# Patient Record
Sex: MALE | Race: WHITE | NOT HISPANIC OR LATINO
[De-identification: names, ages, dates, MRNs, and addresses within clinical notes are randomized per-mention and may not be internally consistent; named-entity substitution may affect disease eponyms.]

---

## 2017-01-27 ENCOUNTER — APPOINTMENT (OUTPATIENT)
Dept: INTERNAL MEDICINE | Facility: CLINIC | Age: 72
End: 2017-01-27

## 2017-01-27 VITALS
DIASTOLIC BLOOD PRESSURE: 66 MMHG | WEIGHT: 156 LBS | HEIGHT: 66 IN | BODY MASS INDEX: 25.07 KG/M2 | RESPIRATION RATE: 14 BRPM | TEMPERATURE: 98 F | OXYGEN SATURATION: 98 % | HEART RATE: 77 BPM | SYSTOLIC BLOOD PRESSURE: 108 MMHG

## 2017-04-28 ENCOUNTER — APPOINTMENT (OUTPATIENT)
Dept: INTERNAL MEDICINE | Facility: CLINIC | Age: 72
End: 2017-04-28

## 2017-04-28 VITALS
HEART RATE: 75 BPM | OXYGEN SATURATION: 98 % | DIASTOLIC BLOOD PRESSURE: 66 MMHG | SYSTOLIC BLOOD PRESSURE: 116 MMHG | WEIGHT: 155 LBS | TEMPERATURE: 98 F | BODY MASS INDEX: 24.91 KG/M2 | HEIGHT: 66 IN | RESPIRATION RATE: 14 BRPM

## 2017-05-01 ENCOUNTER — APPOINTMENT (OUTPATIENT)
Dept: ORTHOPEDIC SURGERY | Facility: CLINIC | Age: 72
End: 2017-05-01

## 2017-05-01 VITALS
BODY MASS INDEX: 24.91 KG/M2 | SYSTOLIC BLOOD PRESSURE: 136 MMHG | HEIGHT: 66 IN | HEART RATE: 77 BPM | WEIGHT: 155 LBS | DIASTOLIC BLOOD PRESSURE: 79 MMHG

## 2017-05-23 ENCOUNTER — APPOINTMENT (OUTPATIENT)
Dept: OTOLARYNGOLOGY | Facility: CLINIC | Age: 72
End: 2017-05-23

## 2017-06-15 ENCOUNTER — APPOINTMENT (OUTPATIENT)
Dept: INTERNAL MEDICINE | Facility: CLINIC | Age: 72
End: 2017-06-15

## 2017-06-15 VITALS
SYSTOLIC BLOOD PRESSURE: 110 MMHG | BODY MASS INDEX: 24.59 KG/M2 | DIASTOLIC BLOOD PRESSURE: 74 MMHG | WEIGHT: 153 LBS | RESPIRATION RATE: 14 BRPM | HEIGHT: 66 IN | OXYGEN SATURATION: 98 % | HEART RATE: 81 BPM | TEMPERATURE: 97.9 F

## 2017-06-15 DIAGNOSIS — Z86.79 PERSONAL HISTORY OF OTHER DISEASES OF THE CIRCULATORY SYSTEM: ICD-10-CM

## 2017-08-17 ENCOUNTER — APPOINTMENT (OUTPATIENT)
Dept: INTERNAL MEDICINE | Facility: CLINIC | Age: 72
End: 2017-08-17
Payer: MEDICARE

## 2017-08-17 VITALS
TEMPERATURE: 97.8 F | HEART RATE: 67 BPM | OXYGEN SATURATION: 97 % | DIASTOLIC BLOOD PRESSURE: 72 MMHG | HEIGHT: 66 IN | BODY MASS INDEX: 25.23 KG/M2 | WEIGHT: 157 LBS | RESPIRATION RATE: 14 BRPM | SYSTOLIC BLOOD PRESSURE: 120 MMHG

## 2017-08-17 PROCEDURE — 99215 OFFICE O/P EST HI 40 MIN: CPT

## 2017-10-16 ENCOUNTER — APPOINTMENT (OUTPATIENT)
Dept: PEDIATRIC ALLERGY IMMUNOLOGY | Facility: CLINIC | Age: 72
End: 2017-10-16

## 2017-10-20 ENCOUNTER — APPOINTMENT (OUTPATIENT)
Dept: INTERNAL MEDICINE | Facility: CLINIC | Age: 72
End: 2017-10-20
Payer: MEDICARE

## 2017-10-20 VITALS
TEMPERATURE: 97.9 F | DIASTOLIC BLOOD PRESSURE: 74 MMHG | RESPIRATION RATE: 14 BRPM | BODY MASS INDEX: 25.71 KG/M2 | HEART RATE: 70 BPM | OXYGEN SATURATION: 98 % | WEIGHT: 160 LBS | HEIGHT: 66 IN | SYSTOLIC BLOOD PRESSURE: 126 MMHG

## 2017-10-20 PROCEDURE — 99215 OFFICE O/P EST HI 40 MIN: CPT | Mod: 25

## 2017-10-20 PROCEDURE — 90662 IIV NO PRSV INCREASED AG IM: CPT

## 2017-10-20 PROCEDURE — G0008: CPT

## 2017-10-24 ENCOUNTER — APPOINTMENT (OUTPATIENT)
Dept: ALLERGY | Facility: CLINIC | Age: 72
End: 2017-10-24
Payer: MEDICARE

## 2017-10-24 VITALS — WEIGHT: 160 LBS | HEIGHT: 66 IN | BODY MASS INDEX: 25.71 KG/M2

## 2017-10-24 PROCEDURE — 99204 OFFICE O/P NEW MOD 45 MIN: CPT

## 2017-10-30 ENCOUNTER — APPOINTMENT (OUTPATIENT)
Dept: ALLERGY | Facility: CLINIC | Age: 72
End: 2017-10-30
Payer: MEDICARE

## 2017-10-30 PROCEDURE — 95044 PATCH/APPLICATION TESTS: CPT

## 2017-11-01 ENCOUNTER — APPOINTMENT (OUTPATIENT)
Dept: ALLERGY | Facility: CLINIC | Age: 72
End: 2017-11-01
Payer: MEDICARE

## 2017-11-01 PROCEDURE — 99212 OFFICE O/P EST SF 10 MIN: CPT

## 2017-11-02 ENCOUNTER — APPOINTMENT (OUTPATIENT)
Dept: ALLERGY | Facility: CLINIC | Age: 72
End: 2017-11-02
Payer: MEDICARE

## 2017-11-02 PROCEDURE — 99213 OFFICE O/P EST LOW 20 MIN: CPT

## 2017-11-02 RX ORDER — TRIAMCINOLONE ACETONIDE 1 MG/G
0.1 CREAM TOPICAL
Qty: 60 | Refills: 0 | Status: DISCONTINUED | COMMUNITY
Start: 2017-09-20

## 2017-12-05 ENCOUNTER — APPOINTMENT (OUTPATIENT)
Dept: INTERNAL MEDICINE | Facility: CLINIC | Age: 72
End: 2017-12-05
Payer: MEDICARE

## 2017-12-05 VITALS
RESPIRATION RATE: 14 BRPM | WEIGHT: 160 LBS | HEIGHT: 66 IN | BODY MASS INDEX: 25.71 KG/M2 | HEART RATE: 76 BPM | DIASTOLIC BLOOD PRESSURE: 60 MMHG | TEMPERATURE: 97.7 F | OXYGEN SATURATION: 98 % | SYSTOLIC BLOOD PRESSURE: 110 MMHG

## 2017-12-05 PROCEDURE — 36415 COLL VENOUS BLD VENIPUNCTURE: CPT

## 2017-12-05 PROCEDURE — G0439: CPT

## 2017-12-06 LAB
25(OH)D3 SERPL-MCNC: 33.6 NG/ML
APPEARANCE: CLEAR
BASOPHILS # BLD AUTO: 0.02 K/UL
BASOPHILS NFR BLD AUTO: 0.5 %
BILIRUBIN URINE: NEGATIVE
BLOOD URINE: NEGATIVE
COLOR: YELLOW
EOSINOPHIL # BLD AUTO: 0.13 K/UL
EOSINOPHIL NFR BLD AUTO: 3 %
GLUCOSE QUALITATIVE U: NEGATIVE MG/DL
HBA1C MFR BLD HPLC: 5.4 %
HCT VFR BLD CALC: 44.6 %
HGB BLD-MCNC: 14.8 G/DL
IMM GRANULOCYTES NFR BLD AUTO: 0.2 %
KETONES URINE: NEGATIVE
LEUKOCYTE ESTERASE URINE: NEGATIVE
LYMPHOCYTES # BLD AUTO: 1.25 K/UL
LYMPHOCYTES NFR BLD AUTO: 28.7 %
MAN DIFF?: NORMAL
MCHC RBC-ENTMCNC: 31.8 PG
MCHC RBC-ENTMCNC: 33.2 GM/DL
MCV RBC AUTO: 95.7 FL
MONOCYTES # BLD AUTO: 0.52 K/UL
MONOCYTES NFR BLD AUTO: 11.9 %
NEUTROPHILS # BLD AUTO: 2.43 K/UL
NEUTROPHILS NFR BLD AUTO: 55.7 %
NITRITE URINE: NEGATIVE
PH URINE: 7
PLATELET # BLD AUTO: 193 K/UL
PROTEIN URINE: NEGATIVE MG/DL
RBC # BLD: 4.66 M/UL
RBC # FLD: 12.6 %
SPECIFIC GRAVITY URINE: 1.02
TSH SERPL-ACNC: 3.01 UIU/ML
UROBILINOGEN URINE: 1 MG/DL
WBC # FLD AUTO: 4.36 K/UL

## 2017-12-12 ENCOUNTER — APPOINTMENT (OUTPATIENT)
Dept: ALLERGY | Facility: CLINIC | Age: 72
End: 2017-12-12
Payer: MEDICARE

## 2017-12-12 VITALS
HEART RATE: 76 BPM | DIASTOLIC BLOOD PRESSURE: 60 MMHG | RESPIRATION RATE: 14 BRPM | BODY MASS INDEX: 25.71 KG/M2 | SYSTOLIC BLOOD PRESSURE: 110 MMHG | HEIGHT: 66 IN | WEIGHT: 160 LBS

## 2017-12-12 PROCEDURE — 99214 OFFICE O/P EST MOD 30 MIN: CPT

## 2017-12-21 ENCOUNTER — APPOINTMENT (OUTPATIENT)
Dept: ALLERGY | Facility: CLINIC | Age: 72
End: 2017-12-21
Payer: MEDICARE

## 2017-12-21 PROCEDURE — 95018 ALL TSTG PERQ&IQ DRUGS/BIOL: CPT

## 2018-01-16 ENCOUNTER — APPOINTMENT (OUTPATIENT)
Dept: ALLERGY | Facility: CLINIC | Age: 73
End: 2018-01-16
Payer: MEDICARE

## 2018-01-16 PROCEDURE — 95076 INGEST CHALLENGE INI 120 MIN: CPT

## 2018-01-23 ENCOUNTER — MESSAGE (OUTPATIENT)
Age: 73
End: 2018-01-23

## 2018-02-06 ENCOUNTER — APPOINTMENT (OUTPATIENT)
Dept: INTERNAL MEDICINE | Facility: CLINIC | Age: 73
End: 2018-02-06
Payer: MEDICARE

## 2018-02-06 VITALS
OXYGEN SATURATION: 98 % | SYSTOLIC BLOOD PRESSURE: 122 MMHG | WEIGHT: 159 LBS | RESPIRATION RATE: 14 BRPM | HEART RATE: 84 BPM | TEMPERATURE: 98.1 F | DIASTOLIC BLOOD PRESSURE: 66 MMHG | HEIGHT: 66 IN | BODY MASS INDEX: 25.55 KG/M2

## 2018-02-06 PROCEDURE — 99215 OFFICE O/P EST HI 40 MIN: CPT

## 2018-02-06 RX ORDER — PENICILLIN G SODIUM 5000000 [USP'U]/1
5000000 INJECTION, POWDER, FOR SOLUTION INTRAMUSCULAR; INTRAVENOUS
Qty: 1 | Refills: 0 | Status: DISCONTINUED | COMMUNITY
Start: 2017-12-19 | End: 2018-02-06

## 2018-02-06 RX ORDER — CEFAZOLIN 1 G/1
1 INJECTION, POWDER, FOR SOLUTION INTRAMUSCULAR; INTRAVENOUS
Qty: 1 | Refills: 0 | Status: DISCONTINUED | COMMUNITY
Start: 2017-12-19 | End: 2018-02-06

## 2018-02-06 RX ORDER — BETAMETHASONE DIPROPIONATE 0.5 MG/G
0.05 CREAM, AUGMENTED TOPICAL TWICE DAILY
Qty: 60 | Refills: 0 | Status: DISCONTINUED | COMMUNITY
Start: 2017-10-24 | End: 2018-02-06

## 2018-02-06 RX ORDER — AMOXICILLIN 250 MG/5ML
250 POWDER, FOR SUSPENSION ORAL 3 TIMES DAILY
Qty: 100 | Refills: 0 | Status: DISCONTINUED | COMMUNITY
Start: 2018-01-09 | End: 2018-02-06

## 2018-03-20 ENCOUNTER — APPOINTMENT (OUTPATIENT)
Dept: INTERNAL MEDICINE | Facility: CLINIC | Age: 73
End: 2018-03-20
Payer: MEDICARE

## 2018-03-20 VITALS
BODY MASS INDEX: 25.23 KG/M2 | HEIGHT: 66 IN | WEIGHT: 157 LBS | OXYGEN SATURATION: 97 % | RESPIRATION RATE: 14 BRPM | DIASTOLIC BLOOD PRESSURE: 60 MMHG | SYSTOLIC BLOOD PRESSURE: 110 MMHG | HEART RATE: 85 BPM | TEMPERATURE: 98.8 F

## 2018-03-20 DIAGNOSIS — Z86.79 PERSONAL HISTORY OF OTHER DISEASES OF THE CIRCULATORY SYSTEM: ICD-10-CM

## 2018-03-20 PROCEDURE — 99215 OFFICE O/P EST HI 40 MIN: CPT

## 2018-05-04 ENCOUNTER — APPOINTMENT (OUTPATIENT)
Dept: INTERNAL MEDICINE | Facility: CLINIC | Age: 73
End: 2018-05-04
Payer: MEDICARE

## 2018-05-04 VITALS
SYSTOLIC BLOOD PRESSURE: 122 MMHG | HEART RATE: 71 BPM | OXYGEN SATURATION: 98 % | WEIGHT: 157 LBS | BODY MASS INDEX: 25.23 KG/M2 | TEMPERATURE: 97.8 F | HEIGHT: 66 IN | DIASTOLIC BLOOD PRESSURE: 68 MMHG | RESPIRATION RATE: 14 BRPM

## 2018-05-04 DIAGNOSIS — M54.16 RADICULOPATHY, LUMBAR REGION: ICD-10-CM

## 2018-05-04 DIAGNOSIS — Z88.1 ALLERGY STATUS TO OTHER ANTIBIOTIC AGENTS: ICD-10-CM

## 2018-05-04 PROCEDURE — 99215 OFFICE O/P EST HI 40 MIN: CPT

## 2018-05-04 RX ORDER — TIMOLOL MALEATE 5 MG/ML
0.5 SOLUTION OPHTHALMIC
Qty: 10 | Refills: 0 | Status: ACTIVE | COMMUNITY
Start: 2018-04-13

## 2018-06-15 ENCOUNTER — APPOINTMENT (OUTPATIENT)
Dept: INTERNAL MEDICINE | Facility: CLINIC | Age: 73
End: 2018-06-15
Payer: MEDICARE

## 2018-06-15 VITALS
DIASTOLIC BLOOD PRESSURE: 64 MMHG | WEIGHT: 158 LBS | RESPIRATION RATE: 14 BRPM | SYSTOLIC BLOOD PRESSURE: 126 MMHG | OXYGEN SATURATION: 99 % | TEMPERATURE: 97.7 F | HEIGHT: 66 IN | BODY MASS INDEX: 25.39 KG/M2 | HEART RATE: 70 BPM

## 2018-06-15 DIAGNOSIS — M48.061 SPINAL STENOSIS, LUMBAR REGION WITHOUT NEUROGENIC CLAUDICATION: ICD-10-CM

## 2018-06-15 DIAGNOSIS — Z87.2 PERSONAL HISTORY OF DISEASES OF THE SKIN AND SUBCUTANEOUS TISSUE: ICD-10-CM

## 2018-06-15 PROCEDURE — 99215 OFFICE O/P EST HI 40 MIN: CPT

## 2018-06-15 NOTE — HISTORY OF PRESENT ILLNESS
[FreeTextEntry1] : \par Feeling well today.\par Needs med refills.\par \par \par hx left hip pain- stable, no meds taken\par -hx [facility] in [facility] eval 2/18 for eval of left hip pain (hx focal pain at lateral hip when sleeping on area) - states had xray and examination, told 2/2 lumbar stenosis, advised exercise with PT - declines; also advised naproxen prn- not using, is prefers to avoid meds. \par -doing home exercising with help in sx's, no pain meds used\par -denies trouble with walking or weakness\par \par hx intermittent hives- resolved\par -no recent use of topical steroid, notes only change was new laundry detergent (now Pursil, was Tide).  No change in what carries in pocket.  Had allergy testing with A/I, told not PCN allergic.\par -hx rash at b/l lateral legs w/ occ a/w tingling sensation on top of head w/o rash noted.   \par -followed by derm, had lesion bx c/w hypersensitivity reaction.  Last seen 5/18 with [facility]- told benign\par -followed by A/I- hx negative patch testing, no clear etiology found\par -denies facial swelling, throat discomfort or sob\par -denies fever, chills, GI sx's, URI sx's or cough.\par \par hx b/l shoulder pain on/off- stable\par -mainly lying on them- hx PT w/o help as referred by ortho in past, requesting referral for new ortho.  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  Still meditating- using "head space cheikh", which likes very much.  Denies depression or SI.\par -still not interested in taking Rx for mood regularly or seeing a psychiatrist\par -hx seeing therapist, felt did not help him much and does not plan to return\par -remains socially active, has close friends and family nearby.  Attends social events (dances, movies, etc.) with friends.  Started listening to [employer] radio and found a very soothing station feels helps his mood tremendously.  Sleeping okay, occ Tylenol PM.\par -sx onset 2/2 adopted son who is in and out of rehab due to alcoholism, feels they have good relationship, feels safe.  Son does not live with him.  States son now out of work and still drinking, not going to rehab.  States son is out of hospital but thinks still drinking. \par -attending [facility] regularly\par \par HLD, CAD-- taking crestor w/o missed doses and tries to eat low fat diet. \par -saw cardio 2/18 advised to cont. current regimen per pt with labs slip given, plans to do soon and forward results.  Has f/u 8/18.\par -hx negative stress test  2016\par -walks 2-3x/d 45 min w/o sx's or limitations.\par -denies dizziness, CP, palpitations, sob or LE edema.\par \par pre-DM--\par -reports healthier eating, exercising\par \par hx urine frequency--stable\par -denies abd pain, n/v, gross hematuria, hesitancy, dysuria or LBP.  \par -followed by , seen 3/18 with nl PSA reported\par \par hx hearing loss- using hearing aides, working well.\par -hx ENT eval\par \par Reports nl appetite and BMs.\par Denies any GI complaints.\par \par Hx glaucoma-\par -hx optho eval 4/18- told all okay.\par \par independent with ADLs\par denies imbalance or falls\par driving w/o issue\par HCP: sonChip- has copy at home\par \par \par

## 2018-06-15 NOTE — ASSESSMENT
[FreeTextEntry1] : \par hx skin rash- hives, unclear etiology, s/p bx by derm 9/17 c/w ?hypersensitivity , now resolved (? relation to detergent)\par -on topical steroid by derm prn- no recent use\par -followed by A/I - s/p negative patch testing, also found NOT allergic to PCN\par -advised prompt ER eval if any sob, facial swelling, throat discomfort etc.\par \par hx anxiety/depression-- 2/2 social issues (son with cancer and hx ETOH abuse); improved- stable\par -off zoloft 2/2 SEs, declines trial of alternate medication or psychiatry referral\par -on lorazepam qhs prn, refilled today (istop [id number]).  Risks/side effects discussed.  Advised to avoid driving and concurrent ETOH use with taking medication.  To avoid concurrent use with Tylenol PM.  Need for office visit for med renewal discussed.\par -hx following with therapist, Dr. Boothe- declines f/u, feels not needed\par -Attending Millic[employer], encouraged\par -reports good social support\par -advised to f/u if sx's worsen\par -relaxation techniques reviewed, considering medication course\par -12/17 TFTs wnl\par \par hx chronic right neck/shoulder/ left upper back radiculopathy pain, hip pain (told 2/2 lumbar stenosis by ortho per pt)- chronic, stable\par -followed by ortho at [facility], hx injection 11/15.  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  Plans for second opinion on HSS.\par -followed for hip/lumbar stenosis at [facility]- seen 2/18, doing home PT on own with improvement\par -on Tylenol prn\par \par hx microscopic hematuria, frequency--likely hx BPH, s/p UTI tx in 1/14\par -4/14 pelvic sono with mild prostate enlargement\par -followed by , seen yearly- last 3/18 w/o any changes per pt and negative UA and nl PSA\par -11/17 UA negative\par \par HLD--11/17 Tchol 161  LDL 96 HDL 40; LFTs wnl\par -hx self d/c'd lovaza 1/14 due to cost (no hx adverse rxn)\par -on crestor\par -followed by cardio - seen 8/17 w/o changes- had f/u 2/18, plans to do labs soon and forward\par \par CAD--hx stents; asx\par -on ASA, statin, ACE-I (started post-stent)\par -hx neg stress test 2016 per pt\par -followed by cardio (Dr. Brooks in [place]) - seen 2/18 w/o changes.  To f/u 8/18.\par \par pre-DM--12/17 A1c- 5.6 (was 5.7 prior)- resolved\par -ADA diet and exercise counseled\par \par hx glaucoma, detached retina repair--asx\par -followed by optho, seen 4/18, told stable per pt.  Seen q 6mo.\par \par hx b/l hearing loss- stable\par -followed by ENT, hx hearing test 4/16--advised hearing aids, using since 4/17.  \par -advised to avoid qtips, use debrox with cotton prn\par \par \par HCM\par --hx CPE 12/17\par --11/17 cmp/lipids wnl by cardio\par --hx flu shot 10/17\par --hx PVX 4/11\par --hx prevnar 11/15\par --hx Tdap 7/14\par --hx shingles vaccine 7/20/11\par --to check on insurance coverage for shingrix and f/u if desires\par --hx colonoscopy 2014, followed by GI for hx polyps for repeat 5 yrs (2019)\par --followed by derm, yearly screening advised- last 5/18 per pt.  Regular use of sun block for skin cancer prevention counseled.\par --HCP: sonChip- has copy at home, asked to bring copy for chart\par \par \par Pt's cell: [phone]\par

## 2018-06-15 NOTE — REVIEW OF SYSTEMS
[see HPI] : see HPI [Negative] : Neurological [Feeling Poorly] : not feeling poorly [Earache] : no earache [Chest Pain] : no chest pain [Palpitations] : no palpitations [Lower Ext Edema] : no extremity edema [Cough] : no cough [Orthopnea] : no orthopnea [SOB on Exertion] : no shortness of breath during exertion [PND] : no PND [Abdominal Pain] : no abdominal pain [Melena] : no melena [Dysuria] : no dysuria [Dizziness] : no dizziness [Limb Weakness] : no limb weakness [Suicidal] : not suicidal [Depression] : no depression

## 2018-06-15 NOTE — PHYSICAL EXAM
[General Appearance - Alert] : alert [General Appearance - In No Acute Distress] : in no acute distress [General Appearance - Well-Appearing] : healthy appearing [Sclera] : the sclera and conjunctiva were normal [PERRL With Normal Accommodation] : pupils were equal in size, round, and reactive to light [Extraocular Movements] : extraocular movements were intact [Outer Ear] : the ears and nose were normal in appearance [Nasal Cavity] : the nasal mucosa and septum were normal [Oropharynx] : the oropharynx was normal [Neck Appearance] : the appearance of the neck was normal [Thyroid Diffuse Enlargement] : the thyroid was not enlarged [Respiration, Rhythm And Depth] : normal respiratory rhythm and effort [Auscultation Breath Sounds / Voice Sounds] : lungs were clear to auscultation bilaterally [Heart Rate And Rhythm] : heart rate was normal and rhythm regular [Heart Sounds] : normal S1 and S2 [Murmurs] : no murmurs [Full Pulse] : the pedal pulses are present [Edema] : there was no peripheral edema [Bowel Sounds] : normal bowel sounds [Abdomen Soft] : soft [Abdomen Tenderness] : non-tender [Cervical Lymph Nodes Enlarged Posterior Bilaterally] : posterior cervical [Cervical Lymph Nodes Enlarged Anterior Bilaterally] : anterior cervical [Supraclavicular Lymph Nodes Enlarged Bilaterally] : supraclavicular [No CVA Tenderness] : no ~M costovertebral angle tenderness [No Spinal Tenderness] : no spinal tenderness [Abnormal Walk] : normal gait [Musculoskeletal - Swelling] : no joint swelling seen [] : no rash [No Focal Deficits] : no focal deficits [Oriented To Time, Place, And Person] : oriented to person, place, and time [Affect] : the affect was normal [Mood] : the mood was normal [FreeTextEntry1] : scattered freckles

## 2018-06-19 ENCOUNTER — MEDICATION RENEWAL (OUTPATIENT)
Age: 73
End: 2018-06-19

## 2018-08-08 ENCOUNTER — TRANSCRIPTION ENCOUNTER (OUTPATIENT)
Age: 73
End: 2018-08-08

## 2018-08-13 ENCOUNTER — FORM ENCOUNTER (OUTPATIENT)
Age: 73
End: 2018-08-13

## 2018-08-14 ENCOUNTER — APPOINTMENT (OUTPATIENT)
Dept: INTERNAL MEDICINE | Facility: CLINIC | Age: 73
End: 2018-08-14
Payer: MEDICARE

## 2018-08-14 ENCOUNTER — APPOINTMENT (OUTPATIENT)
Dept: RADIOLOGY | Facility: IMAGING CENTER | Age: 73
End: 2018-08-14

## 2018-08-14 ENCOUNTER — OUTPATIENT (OUTPATIENT)
Dept: OUTPATIENT SERVICES | Facility: HOSPITAL | Age: 73
LOS: 1 days | End: 2018-08-14
Payer: MEDICARE

## 2018-08-14 VITALS
HEART RATE: 71 BPM | RESPIRATION RATE: 14 BRPM | BODY MASS INDEX: 25.71 KG/M2 | WEIGHT: 160 LBS | SYSTOLIC BLOOD PRESSURE: 106 MMHG | DIASTOLIC BLOOD PRESSURE: 62 MMHG | HEIGHT: 66 IN | OXYGEN SATURATION: 98 % | TEMPERATURE: 98 F

## 2018-08-14 DIAGNOSIS — K46.9 UNSPECIFIED ABDOMINAL HERNIA WITHOUT OBSTRUCTION OR GANGRENE: Chronic | ICD-10-CM

## 2018-08-14 DIAGNOSIS — M79.642 PAIN IN LEFT HAND: ICD-10-CM

## 2018-08-14 PROCEDURE — 73130 X-RAY EXAM OF HAND: CPT

## 2018-08-14 PROCEDURE — 73110 X-RAY EXAM OF WRIST: CPT

## 2018-08-14 PROCEDURE — 99214 OFFICE O/P EST MOD 30 MIN: CPT

## 2018-08-14 NOTE — PHYSICAL EXAM
[General Appearance - Alert] : alert [General Appearance - In No Acute Distress] : in no acute distress [General Appearance - Well-Appearing] : healthy appearing [Sclera] : the sclera and conjunctiva were normal [PERRL With Normal Accommodation] : pupils were equal in size, round, and reactive to light [Extraocular Movements] : extraocular movements were intact [Outer Ear] : the ears and nose were normal in appearance [Nasal Cavity] : the nasal mucosa and septum were normal [Oropharynx] : the oropharynx was normal [Neck Appearance] : the appearance of the neck was normal [Thyroid Diffuse Enlargement] : the thyroid was not enlarged [Auscultation Breath Sounds / Voice Sounds] : lungs were clear to auscultation bilaterally [Respiration, Rhythm And Depth] : normal respiratory rhythm and effort [Heart Rate And Rhythm] : heart rate was normal and rhythm regular [Heart Sounds] : normal S1 and S2 [Murmurs] : no murmurs [Full Pulse] : the pedal pulses are present [Edema] : there was no peripheral edema [Bowel Sounds] : normal bowel sounds [Abdomen Soft] : soft [Abdomen Tenderness] : non-tender [Cervical Lymph Nodes Enlarged Posterior Bilaterally] : posterior cervical [Cervical Lymph Nodes Enlarged Anterior Bilaterally] : anterior cervical [Supraclavicular Lymph Nodes Enlarged Bilaterally] : supraclavicular [No CVA Tenderness] : no ~M costovertebral angle tenderness [No Spinal Tenderness] : no spinal tenderness [Abnormal Walk] : normal gait [Musculoskeletal - Swelling] : no joint swelling seen [] : no rash [No Focal Deficits] : no focal deficits [Oriented To Time, Place, And Person] : oriented to person, place, and time [Affect] : the affect was normal [Mood] : the mood was normal [FreeTextEntry1] : scattered freckles

## 2018-08-14 NOTE — HISTORY OF PRESENT ILLNESS
[FreeTextEntry1] : \par Feeling well today.\par Needs med refills.\par Has copy of labs done by cardio 7/18\par \par c/o left thumb pain- x 2 weeks, on/off with certain movements- grabbing\par -focal sharp pain at base of thumb\par -no hx trauma, joint swelling or redness; no weakness or paresthesias\par -no pain med taken for it\par -is ambidextrous, equal usage reported\par \par hx left hip pain- stable, no meds taken\par -hx [facility] in [facility] eval 2/18 for eval of left hip pain (hx focal pain at lateral hip when sleeping on area) - states had xray and examination, told 2/2 lumbar stenosis, advised exercise with PT - declines; also advised naproxen prn- not using, is prefers to avoid meds. \par -doing home exercising with help in sx's, no pain meds used\par -denies trouble with walking or weakness\par \par hx intermittent hives- resolved\par -no recent use of topical steroid, notes only change was new laundry detergent (now Pursil, was Tide).  No change in what carries in pocket.  Had allergy testing with A/I, told not PCN allergic.\par -hx rash at b/l lateral legs w/ occ a/w tingling sensation on top of head w/o rash noted.   \par -followed by derm, had lesion bx c/w hypersensitivity reaction.  Last seen 5/18 with FBSE- told benign\par -followed by A/I- hx negative patch testing, no clear etiology found\par -denies facial swelling, throat discomfort or sob\par -denies fever, chills, GI sx's, URI sx's or cough.\par \par hx b/l shoulder pain on/off- stable\par -mainly lying on them- hx PT w/o help as referred by ortho in past, requesting referral for new ortho.  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  Still meditating- using "head space cheikh", which likes very much.  Denies depression or SI.\par -still not interested in taking Rx for mood regularly or seeing a psychiatrist\par -hx seeing therapist, felt did not help him much and does not plan to return\par -remains socially active, has close friends and family nearby.  Attends social events (dances, movies, etc.) with friends.  Started listening to [employer] radio and found a very soothing station feels helps his mood tremendously.  Sleeping okay, occ Tylenol PM.\par -sx onset 2/2 adopted son who is in and out of rehab due to alcoholism, feels they have good relationship, feels safe.  Son does not live with him.  States son now out of work and still drinking, not going to rehab.  States son is out of hospital but thinks still drinking. \par -attending ALANON regularly\par \par HLD, CAD-- taking crestor w/o missed doses and tries to eat low fat diet. \par -saw cardio yesterday with labs done 7/18 reviewed and no changes made per pt, reports awaiting screening stress test 8/18\par -hx negative stress test  2016\par -walks 2-3x/d 45 min w/o sx's or limitations.\par -denies dizziness, CP, palpitations, sob or LE edema.\par \par pre-DM--\par -reports healthier eating, exercising\par \par hx urine frequency--stable\par -denies abd pain, n/v, gross hematuria, hesitancy, dysuria or LBP.  \par -followed by , seen 3/18 with nl PSA reported\par \par hx hearing loss- using hearing aides, recently not working well- now being evaluated by company.\par -hx ENT eval\par \par Reports nl appetite and BMs.\par Denies any GI complaints.\par \par Hx glaucoma-\par -hx optho eval 4/18- told all okay.\par \par independent with ADLs\par denies imbalance or falls\par driving w/o issue\par HCP: sonChip- has copy at home\par \par \par

## 2018-08-14 NOTE — DATA REVIEWED
[FreeTextEntry1] : [facility] labs- to be scanned into chart\par \par 7/20/18\par cmp wnl\par Tchol 143  LDL 75 HDL 40

## 2018-08-14 NOTE — ASSESSMENT
[FreeTextEntry1] : \par left hand pain- ? tendonitis vs OA\par -check xray of left thumb and wrist\par -advised Tylenol prn\par -advised to f/u if sx's worsen\par \par hx skin rash- hives, unclear etiology, s/p bx by derm 9/17 c/w ?hypersensitivity , now resolved (? relation to detergent)\par -on topical steroid by derm prn- no recent use\par -followed by A/I - s/p negative patch testing, also found NOT allergic to PCN\par -advised prompt ER eval if any sob, facial swelling, throat discomfort etc.\par \par hx anxiety/depression-- 2/2 social issues (son with cancer and hx ETOH abuse); improved- stable\par -off zoloft 2/2 SEs, declines trial of alternate medication or psychiatry referral\par -on lorazepam qhs prn, refilled today (istop [id number] Risks/side effects discussed.  Advised to avoid driving and concurrent ETOH use with taking medication.  To avoid concurrent use with Tylenol PM.  Need for office visit for med renewal discussed.\par -hx following with therapist, Dr. Boothe- declines f/u, feels not needed\par -Attending [employer], encouraged\par -reports good social support\par -advised to f/u if sx's worsen\par -relaxation techniques reviewed, considering medication course\par -12/17 TFTs wnl\par \par hx chronic right neck/shoulder/ left upper back radiculopathy pain, hip pain (told 2/2 lumbar stenosis by ortho per pt)- chronic, stable\par -followed by ortho at [facility], hx injection 11/15.  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  Plans for second opinion on HSS.\par -[facility]- seen [facility]/18, doing home PT on own with improvement\par -on Tylenol prn\par \par hx microscopic hematuria, frequency--likely hx BPH, s/p UTI tx in 1/14\par -4/14 pelvic sono with mild prostate enlargement\par -followed by , seen yearly- last 3/18 w/o any changes per pt and negative UA and nl PSA\par -11/17 UA negative\par \par HLD-- 7/18 Tchol 143  LDL 75 HDL 40; LFTs wnl\par -hx self d/c'd lovaza 1/14 due to cost (no hx adverse rxn)\par -on crestor\par -followed by cardio - 8/13/18 with labs done 7/18 reviewed and no changes made per pt, reports awaiting screening stress test 8/18\par \par CAD--hx stents; asx\par -on ASA, statin, ACE-I (started post-stent)\par -hx neg stress test 2016 per pt\par -followed by cardio (Dr. Brooks in [place]) - seen 8/18 with labs done 7/18 reviewed and no changes made per pt, reports awaiting screening stress test 8/18\par \par pre-DM--12/17 A1c- 5.6 (was 5.7 prior)- resolved\par -ADA diet and exercise counseled\par \par hx glaucoma, detached retina repair--asx\par -followed by optho, seen 4/18, told stable per pt.  Seen q 6mo.\par \par hx b/l hearing loss- stable\par -followed by ENT, hx hearing test 4/16--advised hearing aids, using since 4/17.  Currently undergoing repair per pt.\par -advised to avoid qtips, use debrox with cotton prn\par \par \par HCM\par --hx CPE 12/17\par --11/17 cmp/lipids wnl by cardio\par --hx flu shot 10/17\par --hx PVX 4/11\par --hx prevnar 11/15\par --hx Tdap 7/14\par --hx shingles vaccine 7/20/11\par --to check on insurance coverage for shingrix and f/u if desires\par --hx colonoscopy 2014, followed by GI for hx polyps for repeat 5 yrs (2019)\par --followed by derm, yearly screening advised- last 5/18 per pt.  Regular use of sun block for skin cancer prevention counseled.\par --HCP: sonChip- has copy at home, asked to bring copy for chart\par \par \par Pt's cell: [phone]\par

## 2018-09-25 ENCOUNTER — APPOINTMENT (OUTPATIENT)
Dept: INTERNAL MEDICINE | Facility: CLINIC | Age: 73
End: 2018-09-25
Payer: MEDICARE

## 2018-09-25 VITALS
OXYGEN SATURATION: 98 % | HEIGHT: 66 IN | TEMPERATURE: 98 F | SYSTOLIC BLOOD PRESSURE: 122 MMHG | BODY MASS INDEX: 25.39 KG/M2 | RESPIRATION RATE: 14 BRPM | DIASTOLIC BLOOD PRESSURE: 64 MMHG | WEIGHT: 158 LBS | HEART RATE: 81 BPM

## 2018-09-25 PROCEDURE — G0008: CPT

## 2018-09-25 PROCEDURE — 99214 OFFICE O/P EST MOD 30 MIN: CPT | Mod: 25

## 2018-09-25 PROCEDURE — 90662 IIV NO PRSV INCREASED AG IM: CPT

## 2018-09-25 NOTE — HISTORY OF PRESENT ILLNESS
[FreeTextEntry1] : \par Feeling well today.\par Needs med refills.\par \par \par hx left thumb pain- persistent x several weeks, on/off with certain movements- grabbing\par -focal sharp pain at base of thumb/wrist\par -taking ES Tylenol with minimal help\par -denies hx trauma, joint swelling/redness, weakness or paresthesias\par -is ambidextrous, equal usage reported but at times does favor left side\par -interested to see ortho\par \par hx left hip pain- stable, no meds taken\par -[facility] eval of left hip pain (hx focal pain at lateral hip when sleeping on area) - states had xray and examination, told 2/2 lumbar stenosis, advised exercise with PT - declines; also advised naproxen prn- not using, is prefers to avoid meds. \par -doing home exercising with help in sx's, no pain meds used\par -denies trouble with walking or weakness\par \par hx intermittent hives- resolved\par -no recent use of topical steroid, notes only change was new laundry detergent (now Pursil, was Tide).  No change in what carries in pocket.  Had allergy testing with A/I, told not PCN allergic.\par -hx rash at b/l lateral legs w/ occ a/w tingling sensation on top of head w/o rash noted.   \par -followed by derm, had lesion bx c/w hypersensitivity reaction.  Last seen 5/18 with FBSE- told benign\par -followed by A/I- hx negative patch testing, no clear etiology found\par -denies facial swelling, throat discomfort or sob\par -denies fever, chills, GI sx's, URI sx's or cough.\par \par hx b/l shoulder pain on/off- stable\par -mainly lying on them- hx PT w/o help as referred by ortho in past, requesting referral for new ortho.  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  Still meditating- using "head space cheikh", which likes very much.  Denies depression or SI.\par -still not interested in taking Rx for mood regularly or seeing a psychiatrist\par -hx seeing therapist, felt did not help him much and does not plan to return\par -remains socially active, has close friends and family nearby.  Attends social events (dances, movies, etc.) with friends.  Started listening to [employer] radio and found a very soothing station feels helps his mood tremendously.  Sleeping okay, occ Tylenol PM.\par -sx onset 2/2 adopted son who is in and out of rehab due to alcoholism, also in/out of hosiptial for other chronic medical issues, feels they have good relationship, feels safe.  Son does not live with him.  States son now out of work and still drinking, not going to rehab.  States son is out of hospital but thinks still drinking. \par -attending [employer] on occasion, admits "brings him down" when he attends\par \par HLD, CAD-- taking crestor w/o missed doses and tries to eat low fat diet. \par -followed by cardio, seen 8/18 with labs done 7/18 reviewed and no changes made per pt, reports hx negative screening stress test 8/18.  To f/u 11/18.\par -walks 2-3x/d 45 min w/o sx's or limitations.\par -denies dizziness, CP, palpitations, sob or LE edema.\par \par pre-DM--\par -reports healthier eating, exercising\par \par hx urine frequency--better, on myrbetriq x 2 weeks per \par -denies abd pain, n/v, gross hematuria, hesitancy, dysuria or LBP.  \par -followed by , seen 9/18 \par \par hx hearing loss- using hearing aides, recently not working well- now being evaluated by company.\par -hx ENT eval\par \par Reports nl appetite and BMs.\par Denies any GI complaints.\par \par Hx glaucoma-\par -hx optho eval 4/18- told all okay.\par \par independent with ADLs\par denies imbalance or falls\par driving w/o issue\par HCP: sonChip- has copy at home\par \par \par

## 2018-09-26 ENCOUNTER — APPOINTMENT (OUTPATIENT)
Dept: ORTHOPEDIC SURGERY | Facility: CLINIC | Age: 73
End: 2018-09-26
Payer: MEDICARE

## 2018-09-26 VITALS — HEART RATE: 83 BPM | DIASTOLIC BLOOD PRESSURE: 65 MMHG | SYSTOLIC BLOOD PRESSURE: 114 MMHG

## 2018-09-26 PROCEDURE — 20550 NJX 1 TENDON SHEATH/LIGAMENT: CPT | Mod: LT

## 2018-09-26 PROCEDURE — 99203 OFFICE O/P NEW LOW 30 MIN: CPT | Mod: 25

## 2018-10-29 ENCOUNTER — APPOINTMENT (OUTPATIENT)
Dept: ORTHOPEDIC SURGERY | Facility: CLINIC | Age: 73
End: 2018-10-29
Payer: MEDICARE

## 2018-10-29 VITALS
HEART RATE: 77 BPM | DIASTOLIC BLOOD PRESSURE: 72 MMHG | WEIGHT: 158 LBS | SYSTOLIC BLOOD PRESSURE: 122 MMHG | BODY MASS INDEX: 25.39 KG/M2 | HEIGHT: 66 IN

## 2018-10-29 PROCEDURE — 20550 NJX 1 TENDON SHEATH/LIGAMENT: CPT | Mod: LT

## 2018-10-29 PROCEDURE — 99214 OFFICE O/P EST MOD 30 MIN: CPT | Mod: 25

## 2018-11-12 ENCOUNTER — APPOINTMENT (OUTPATIENT)
Dept: INTERNAL MEDICINE | Facility: CLINIC | Age: 73
End: 2018-11-12
Payer: MEDICARE

## 2018-11-12 VITALS
HEIGHT: 66 IN | TEMPERATURE: 98.2 F | DIASTOLIC BLOOD PRESSURE: 58 MMHG | WEIGHT: 155 LBS | RESPIRATION RATE: 14 BRPM | SYSTOLIC BLOOD PRESSURE: 122 MMHG | OXYGEN SATURATION: 98 % | HEART RATE: 79 BPM | BODY MASS INDEX: 24.91 KG/M2

## 2018-11-12 PROCEDURE — 99214 OFFICE O/P EST MOD 30 MIN: CPT

## 2018-11-12 NOTE — HISTORY OF PRESENT ILLNESS
[FreeTextEntry1] : \par Feeling well today.\par Needs med refills.\par \par Reports hx now resolved b/l groin rash- seen by derm 10/18, dx'd fungal and given tx course.\par \par hx left thumb pain- followed by ortho, dx'd L- de Quervain's and is s/p injection x 2 (last 10/18)- sx's now resolved.  Advised physiatry eval re: hx right arm tingling with laying on it- eval pending\par -hx thumb pain on/off with certain movements- grabbing, focal sharp pain at base of thumb/wrist\par -denied hx trauma, joint swelling/redness, weakness or paresthesias\par -is ambidextrous\par \par hx left hip pain- stable, no meds taken\par -hx hospital [facility] in [facility] eval 2/18 for eval of left hip pain (hx focal pain at lateral hip when sleeping on area) - states had xray and examination, told 2/2 lumbar stenosis, advised exercise with PT - declines; also advised naproxen prn- not using, is prefers to avoid meds. \par -doing home exercising with help in sx's, no pain meds used\par -denies trouble with walking or weakness\par \par hx intermittent hives- resolved\par -no recent use of topical steroid, notes only change was new laundry detergent (now Pursil, was Tide).  No change in what carries in pocket.  Had allergy testing with A/I, told not PCN allergic.\par -hx rash at b/l lateral legs w/ occ a/w tingling sensation on top of head w/o rash noted.   \par -followed by derm, had lesion bx c/w hypersensitivity reaction.  Last seen 5/18 with [facility]- told benign\par -followed by A/I- hx negative patch testing, no clear etiology found\par -denies facial swelling, throat discomfort or sob\par -denies fever, chills, GI sx's, URI sx's or cough.\par \par hx b/l shoulder pain on/off- stable\par -mainly lying on them- hx PT w/o help as referred by ortho in past, requesting referral for new ortho.  \par -denies swelling or neck pain\par -denies weakness, occ tingling on right arm fingers while driving- chronic, no change\par -no pain med used, except Tylenol PM on occasion\par -seen by ortho 5/17, told cervical DJD and tendonitis with naproxen/PT advised- pt declines.  Awaiting physiatry eval per ortho - hesitant.\par \par hx anxiety/depression- stable, gets anxious on/off with rare mild panic attacks relieved with deep breathing or lorazepam though not using daily.  Still meditating- using "head space cheikh", which likes very much.  Denies depression or SI.\par -still not interested in taking Rx for mood regularly or seeing a psychiatrist\par -hx seeing therapist, felt did not help him much and does not plan to return\par -remains socially active, has close friends and family nearby.  Attends social events (dances, movies, etc.) with friends.  Still listening to [employer] radio and found a very soothing station feels helps his mood tremendously.  Sleeping okay, occ Tylenol PM.\par -sx onset 2/2 adopted son who is in and out of rehab due to alcoholism, also in/out of hosiptial for other chronic medical issues, feels they have good relationship, feels safe.  Son does not live with him.  States son now out of work and still drinking, not going to rehab.  States son is out of hospital but thinks still drinking. \par -attending [employer] on occasion, admits "brings him down" when he attends\par \par HLD, CAD-- taking crestor w/o missed doses and tries to eat low fat diet. \par -followed by cardio, seen 8/18 with labs done 7/18 reviewed and no changes made per pt, reports hx negative screening stress test 8/18.  To f/u 1/19, has slip for labs to do prior to visit.\par -walks 2-3x/d 45 min w/o sx's or limitations.\par -denies dizziness, CP, palpitations, sob or LE edema.\par \par pre-DM--\par -reports healthier eating, exercising\par \par hx urine frequency--better, on myrbetriq per \par -denies abd pain, n/v, gross hematuria, hesitancy, dysuria or LBP.  \par -followed by , seen 9/18 \par \par hx hearing loss- using hearing aides, recently not working well- now being evaluated by company.\par -hx ENT eval\par \par Reports nl appetite and BMs.\par Denies any GI complaints.\par \par Hx glaucoma-\par -hx optho eval 4/18- told all okay.\par \par independent with ADLs\par denies imbalance or falls\par driving w/o issue\par HCP: sonChip- has copy at home\par \par \par

## 2018-11-12 NOTE — ASSESSMENT
[FreeTextEntry1] : \par left hand pain- dx'd L- de Quervain's and is s/p injection x 2 (last 10/18)- now resolved.  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 relation to detergent)\par -on topical steroid by derm prn- no recent use.  hx tx'd fungal infection in groin 10/18 by derm per pt- now asx.\par -followed by A/I - s/p negative patch testing, also found NOT allergic to PCN\par -advised prompt ER eval if any sob, facial swelling, throat discomfort etc.\par \par hx anxiety/depression-- 2/2 social issues (son with cancer and hx ETOH abuse); improved- stable\par -off zoloft 2/2 SEs, declines trial of alternate medication or psychiatry referral\par -on lorazepam qhs prn, refilled today (istop [id number]) Risks/side effects discussed.  Advised to avoid driving and concurrent ETOH use with taking medication.  To avoid concurrent use with Tylenol PM.  Need for office visit for med renewal discussed.\par -hx following with therapist, Dr. Boothe- declines f/u, feels not needed\par -Attending [employer], encouraged\par -reports good social support\par -advised to f/u if sx's worsen\par -relaxation techniques reviewed, considering medication course\par -12/17 TFTs wnl\par \par hx chronic right neck/shoulder/ left upper back radiculopathy pain, hip pain (told 2/2 lumbar stenosis by ortho per pt)- chronic, stable\par -followed by ortho at [facility], hx injection 11/15.  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  Plans for second opinion on HSS.\par -followed for hip/lumbar stenosis at \[facility]\"" for special surgery- seen 2/18, doing home PT on own with improvement\par -awaiting physiatry eval (referred by ortho, Dr. Rosado 9/18)- pending\par -on Tylenol prn\par \par hx microscopic hematuria, frequency- improved\par -4/14 pelvic sono with mild prostate enlargement\par -followed by , seen 9/18 with Myrbetriq started- sx's better since per pt\par -11/17 UA negative\par \par HLD-- 7/18 Tchol 143  LDL 75 HDL 40; LFTs wnl\par -hx self d/c'd lovaza 1/14 due to cost (no hx adverse rxn)\par -on crestor\par -followed by cardio - seen 8/18 with labs done 7/18 reviewed and no changes made per pt, hx negative screening stress test 8/18.  To f /u 1/19 with labs prior planned.\par \par CAD--hx stents; asx\par -on ASA, statin, ACE-I (started post-stent)\par -hx neg stress test 2016 per pt\par -followed by cardio (Dr. Brooks in [place]) - seen 8/18 with labs done 7/18 reviewed and no changes made per pt, reports awaiting screening stress test 8/18.  To f/u 1/19.\par \par pre-DM--12/17 A1c- 5.6 (was 5.7 prior)- resolved\par -ADA diet and exercise counseled\par \par hx glaucoma, detached retina repair--asx\par -followed by optho, seen 4/18, told stable per pt.  Seen q 6mo.  \par \par hx b/l hearing loss- stable\par -followed by ENT, hx hearing test 4/16--advised hearing aids, using since 4/17.  \par -advised to avoid qtips, use debrox with cotton prn\par \par \par HCM\par --hx CPE 12/17, yearly advised with screening labs.\par --hx flu shot 9/18\par --hx PVX 4/11\par --hx prevnar 11/15\par --hx Tdap 7/14\par --hx shingles vaccine 7/20/11\par --getting shingrix series at pharmacy, #2 pending (awaiting call from [email or web address] when in stock per pt)\par --hx colonoscopy 2014, followed by GI for hx polyps for repeat 5 yrs (2019)\par --followed by derm, yearly screening advised- last 5/18 per pt.  Regular use of sun block for skin cancer prevention counseled.\par --HCP: sonChip- has copy at home, asked to bring copy for chart\par \par \par Pt's cell: [phone]\par

## 2018-12-12 ENCOUNTER — APPOINTMENT (OUTPATIENT)
Dept: INTERNAL MEDICINE | Facility: CLINIC | Age: 73
End: 2018-12-12
Payer: MEDICARE

## 2018-12-12 VITALS
HEIGHT: 66 IN | RESPIRATION RATE: 14 BRPM | WEIGHT: 158 LBS | OXYGEN SATURATION: 98 % | DIASTOLIC BLOOD PRESSURE: 60 MMHG | TEMPERATURE: 98.2 F | HEART RATE: 74 BPM | BODY MASS INDEX: 25.39 KG/M2 | SYSTOLIC BLOOD PRESSURE: 110 MMHG

## 2018-12-12 PROCEDURE — 99214 OFFICE O/P EST MOD 30 MIN: CPT

## 2018-12-12 NOTE — HISTORY OF PRESENT ILLNESS
[FreeTextEntry1] : \par Feeling well today.\par Needs med refills.\par \par \par hx left thumb pain- resolved\par -followed by ortho, dx'd L- de Quervain's and is s/p injection x 2 (last 10/18)- sx's since resolved.  Advised physiatry eval re: hx right arm tingling with laying on it- eval pending\par -hx thumb pain on/off with certain movements- grabbing, focal sharp pain at base of thumb/wrist\par -denied hx trauma, joint swelling/redness, weakness or paresthesias\par -is ambidextrous\par \par hx left hip pain- stable, no meds taken\par -hx hospital [facility] in [facility] eval 2/18 for eval of left hip pain (hx focal pain at lateral hip when sleeping on area) - states had xray and examination, told 2/2 lumbar stenosis, advised exercise with PT - declines; also advised naproxen prn- not using, is prefers to avoid meds. \par -doing home exercising with help in sx's, no pain meds used\par -denies trouble with walking or weakness\par \par hx intermittent hives- resolved\par -no recent use of topical steroid, notes only change was new laundry detergent (now Pursil, was Tide).  No change in what carries in pocket.  Had allergy testing with A/I, told not PCN allergic.\par -hx rash at b/l lateral legs w/ occ a/w tingling sensation on top of head w/o rash noted.   \par -followed by derm, had lesion bx c/w hypersensitivity reaction.  Last seen 5/18 with [facility]- told benign\par -followed by A/I- hx negative patch testing, no clear etiology found\par -denies facial swelling, throat discomfort or sob\par -denies fever, chills, GI sx's, URI sx's or cough.\par \par hx b/l shoulder pain on/off- stable\par -mainly lying on them- hx PT w/o help as referred by ortho in past, requesting referral for new ortho.  \par -denies swelling or neck pain\par -denies weakness, occ tingling on right arm fingers while driving- chronic, no change\par -no pain med used, except Tylenol PM on occasion\par -seen by ortho 5/17, told cervical DJD and tendonitis with naproxen/PT advised- pt declines.  Awaiting physiatry eval per ortho - hesitant.\par \par hx anxiety/depression- stable, gets anxious on/off with rare mild panic attacks relieved with deep breathing or lorazepam though not using daily.  Still meditating- using "head space cheikh", which likes very much.  Denies depression or SI.\par -still not interested in taking Rx for mood regularly or seeing a psychiatrist\par -hx seeing therapist, felt did not help him much and does not plan to return\par -remains socially active, has close friends and family nearby.  Attends social events (dances, movies, etc.) with friends.  Still listening to [email or web address] radio and found a very soothing station feels helps his mood tremendously.  Sleeping okay, occ Tylenol PM.\par -sx onset 2/2 adopted son who is in and out of rehab due to alcoholism, also in/out of hosiptial for other chronic medical issues, feels they have good relationship, feels safe.  Son does not live with him.  States son now out of work and still drinking, not going to rehab.  States son is out of hospital but thinks still drinking. \par -attending [employer] on occasion, admits "brings him down" when he attends\par \par HLD, CAD-- taking crestor w/o missed doses and tries to eat low fat diet. \par -followed by cardio, seen 8/18 with labs done 7/18 reviewed and no changes made per pt, reports hx negative screening stress test 8/18.  To f/u 1/19, has slip for labs to do prior to visit.\par -walks 2-3x/d 45 min w/o sx's or limitations.\par -denies dizziness, CP, palpitations, sob or LE edema.\par \par pre-DM--\par -reports healthier eating, exercising\par \par hx urine frequency--better, on myrbetriq per \par -denies abd pain, n/v, gross hematuria, hesitancy, dysuria or LBP.  \par -followed by [facility], seen 9/18 \par \par hx hearing loss- using hearing aides, recently not working well- now being evaluated by company.\par -hx ENT eval\par \par Reports nl appetite and BMs.\par Denies any GI complaints.\par \par Hx glaucoma-\par -hx optho eval 4/18- told all okay.\par \par independent with ADLs\par denies imbalance or falls\par driving w/o issue\par HCP: sonChip- has copy at home\par \par \par

## 2018-12-12 NOTE — ASSESSMENT
[FreeTextEntry1] : \par hx left hand pain- dx'd L- de Quervain's and is s/p injection x 2 (last 10/18)- now resolved.  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 relation to detergent)\par -on topical steroid by derm prn- no recent use.  hx tx'd fungal infection in groin 10/18 by derm per pt- now asx.\par -followed by A/I - s/p negative patch testing, also found NOT allergic to PCN\par -advised prompt ER eval if any sob, facial swelling, throat discomfort etc.\par \par hx anxiety/depression-- 2/2 social issues (son with cancer and hx ETOH abuse); improved- stable\par -off zoloft 2/2 SEs, declines trial of alternate medication or psychiatry referral\par -on lorazepam qhs prn, refilled today (istop [id number] checked by RN, to be scanned into chart) Risks/side effects discussed.  Advised to avoid driving and concurrent ETOH use with taking medication.  To avoid concurrent use with Tylenol PM.  Need for office visit for med renewal discussed.\par -hx following with therapist, Dr. Boothe- declines f/u, feels not needed\par -Attending [employer], encouraged\par -reports good social support\par -advised to f/u if sx's worsen\par -relaxation techniques reviewed, considering medication course\par -12/17 TFTs wnl\par \par hx chronic right neck/shoulder/ left upper back radiculopathy pain, hip pain (told 2/2 lumbar stenosis by ortho per pt)- chronic, stable\par -followed by ortho at [facility], hx injection 11/15.  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  Plans for second opinion on HSS.\par -followed for hip/lumbar stenosis at [facility] for special surgery- seen 2/18, doing home PT on own with improvement\par -awaiting physiatry eval (referred by ortho, Dr. Rosado 9/18)- pending\par -on Tylenol prn\par \par hx microscopic hematuria, frequency- improved\par -4/14 pelvic sono with mild prostate enlargement\par -followed by , seen 9/18 with Myrbetriq started- sx's better since per pt\par -11/17 UA negative\par \par HLD-- 7/18 Tchol 143  LDL 75 HDL 40; LFTs wnl\par -hx self d/c'd lovaza 1/14 due to cost (no hx adverse rxn)\par -on crestor\par -followed by cardio - seen 8/18 with labs done 7/18 reviewed and no changes made per pt, hx negative screening stress test 8/18.  To f /u 1/19 with labs prior planned.\par \par CAD--hx stents; asx\par -on ASA, statin, ACE-I (started post-stent)\par -hx neg stress test 2016 per pt\par -followed by cardio (Dr. Brooks in [place]) - seen 8/18 with labs done 7/18 reviewed and no changes made per pt, reports awaiting screening stress test 8/18.  To f/u 1/19.\par \par pre-DM--12/17 A1c- 5.6 (was 5.7 prior)- resolved\par -ADA diet and exercise counseled\par \par hx glaucoma, detached retina repair--asx\par -followed by optho, seen 4/18, told stable per pt.  Seen q 6mo.  \par \par hx b/l hearing loss- stable\par -followed by ENT, hx hearing test 4/16--advised hearing aids, using since 4/17.  \par -advised to avoid qtips, use debrox with cotton prn\par \par \par HCM\par --hx CPE 12/17, yearly advised with screening labs.\par --hx flu shot 9/18\par --hx PVX 4/11\par --hx prevnar 11/15\par --hx Tdap 7/14\par --hx shingles vaccine 7/20/11\par --getting shingrix series at pharmacy, #2 pending (awaiting call from [employer] when in stock per pt)\par --hx colonoscopy 2014, followed by GI for hx polyps for repeat 5 yrs (2019)\par --followed by derm, yearly screening advised- last 5/18 per pt.  Regular use of sun block for skin cancer prevention counseled.\par --HCP: sonChip- has copy at home, asked to bring copy for chart\par \par \par Pt's cell: [phone]\par Pt has office f/u for CPE and screening labs 2/28/18 (pt defers labs to then, also to get labs per cardio 1/19 will forward results).\par

## 2019-01-08 ENCOUNTER — APPOINTMENT (OUTPATIENT)
Dept: INTERNAL MEDICINE | Facility: CLINIC | Age: 74
End: 2019-01-08
Payer: MEDICARE

## 2019-01-08 VITALS
TEMPERATURE: 99.2 F | HEIGHT: 66 IN | DIASTOLIC BLOOD PRESSURE: 70 MMHG | OXYGEN SATURATION: 96 % | RESPIRATION RATE: 14 BRPM | HEART RATE: 86 BPM | WEIGHT: 156 LBS | BODY MASS INDEX: 25.07 KG/M2 | SYSTOLIC BLOOD PRESSURE: 112 MMHG

## 2019-01-08 PROCEDURE — 99214 OFFICE O/P EST MOD 30 MIN: CPT

## 2019-01-08 RX ORDER — MIRABEGRON 25 MG/1
25 TABLET, FILM COATED, EXTENDED RELEASE ORAL
Refills: 0 | Status: DISCONTINUED | COMMUNITY
End: 2019-01-08

## 2019-01-08 NOTE — HISTORY OF PRESENT ILLNESS
[FreeTextEntry1] : \par Needs med refills.\par \par Reports mild flu like sx's since got #2 shingrix shot at pharmacy 1/4/19- mainly body aching, nasal congestion, rhinitis, subjective fever, cough with min sputum- overall sx's getting less.\par -using steam to nose/face with help\par -no NSAID use, occ Tylenol PM as prior for shoulder pain- last taken 2d ago\par -denies fever, last Temp 99 last night\par -denies CP, sob or dizziness\par -reports nl appetite and BMs.\par \par hx urine frequency--on myrbetriq per - states self d/c'd 4d ago due to constipation with use and BMs since nl.  Has since had recurrence of usual urine frequency and nocturia.  Wants trial of new med- declines prior med use b/f myrbetriq \par -denies abd pain, n/v, gross hematuria, hesitancy, dysuria or LBP.  \par -followed by , seen 9/18 - requests referral for new in health system\par \par hx left thumb pain- resolved\par -followed by ortho, dx'd L- de Quervain's and is s/p injection x 2 (last 10/18)- sx's since resolved.  Advised physiatry eval re: hx right arm tingling with laying on it- eval pending\par -hx thumb pain on/off with certain movements- grabbing, focal sharp pain at base of thumb/wrist\par -denied hx trauma, joint swelling/redness, weakness or paresthesias\par -is ambidextrous\par \par hx left hip pain- stable, no meds taken\par -hx hospital [facility] in [facility] eval 2/18 for eval of left hip pain (hx focal pain at lateral hip when sleeping on area) - states had xray and examination, told 2/2 lumbar stenosis, advised exercise with PT - declines; also advised naproxen prn- not using, is prefers to avoid meds. \par -doing home exercising with help in sx's, no pain meds used\par -denies trouble with walking or weakness\par \par hx intermittent hives- resolved\par -no recent use of topical steroid, notes only change was new laundry detergent (now Pursil, was Tide).  No change in what carries in pocket.  Had allergy testing with A/I, told not PCN allergic.\par -hx rash at b/l lateral legs w/ occ a/w tingling sensation on top of head w/o rash noted.   \par -followed by derm, had lesion bx c/w hypersensitivity reaction.  Last seen 5/18 with [facility]- told benign\par -followed by A/I- hx negative patch testing, no clear etiology found\par -denies facial swelling, throat discomfort or sob\par -denies fever, chills, GI sx's, URI sx's or cough.\par \par hx b/l shoulder pain on/off- stable\par -mainly lying on them- hx PT w/o help as referred by ortho in past, requesting referral for new ortho.  \par -denies swelling or neck pain\par -denies weakness, occ tingling on right arm fingers while driving- chronic, no change\par -no pain med used, except Tylenol PM on occasion\par -seen by ortho 5/17, told cervical DJD and tendonitis with naproxen/PT advised- pt declines.  Awaiting physiatry eval per ortho - hesitant.\par \par hx anxiety/depression- stable, gets anxious on/off with rare mild panic attacks relieved with deep breathing or lorazepam though not using daily.  Still meditating- using "head space cheikh", which likes very much.  Denies depression or SI.\par -still not interested in taking Rx for mood regularly or seeing a psychiatrist\par -hx seeing therapist, felt did not help him much and does not plan to return\par -remains socially active, has close friends and family nearby.  Attends social events (dances, movies, etc.) with friends.  Still listening to [employer] radio and found a very soothing station feels helps his mood tremendously.  Sleeping okay, occ Tylenol PM.\par -sx onset 2/2 adopted son who is in and out of rehab due to alcoholism, also in/out of hosiptial for other chronic medical issues, feels they have good relationship, feels safe.  Son does not live with him.  States son now out of work and still drinking, not going to rehab.  States son is out of hospital but thinks still drinking. \par -attending [employer] on occasion, admits "brings him down" when he attends\par \par HLD, CAD-- taking crestor w/o missed doses and tries to eat low fat diet. \par -followed by cardio, seen 8/18 with labs done 7/18 reviewed and no changes made per pt, reports hx negative screening stress test 8/18.  To f/u 2/19, has slip for labs to do prior to visit.\par -walks 2-3x/d 45 min w/o sx's or limitations.\par -denies dizziness, CP, palpitations, sob or LE edema.\par \par pre-DM--\par -reports healthier eating, exercising\par \par hx hearing loss- using hearing aides, recently not working well- now being evaluated by company.\par -hx ENT eval\par \par Reports nl appetite and BMs.\par Denies any GI complaints.\par \par Hx glaucoma-\par -hx optho eval 4/18- told all okay.\par \par independent with ADLs\par denies imbalance or falls\par driving w/o issue\par HCP: sonChip- has copy at home\par \par \par

## 2019-01-08 NOTE — REVIEW OF SYSTEMS
[Negative] : Neurological [see HPI] : see HPI [Feeling Poorly] : not feeling poorly [Earache] : no earache [Chest Pain] : no chest pain [Palpitations] : no palpitations [Lower Ext Edema] : no extremity edema [Orthopnea] : no orthopnea [SOB on Exertion] : no shortness of breath during exertion [PND] : no PND [Abdominal Pain] : no abdominal pain [Melena] : no melena [Dysuria] : no dysuria [Dizziness] : no dizziness [Limb Weakness] : no limb weakness [Suicidal] : not suicidal [Depression] : no depression

## 2019-01-08 NOTE — ASSESSMENT
[FreeTextEntry1] : \par hx flu-like sx's- s/p shingrix shot #2, likely SE vaccine- improving per pt, VSS and afebrile\par -advised rest, increased hydration\par -cont steam use\par -advised to f/u if sx's worsen, sob, etc.\par \par hx microscopic hematuria, frequency- off myrbetriq 2/2 constipation\par -4/14 pelvic sono with mild prostate enlargement\par -followed by , seen 9/18 - referral to new given per request\par -for trial of oxybutinin- risks/benefits/SEs counseled, advised to f/u if sx onset\par -advised to f/u if sx's worsen, dysuria, hesitancy, etc.\par \par hx anxiety/depression-- 2/2 social issues (son with cancer and hx ETOH abuse); improved- stable\par -off zoloft 2/2 SEs, declines trial of alternate medication or psychiatry referral\par -on lorazepam qhs prn, refilled today (istop [id number]). Risks/side effects discussed.  Advised to avoid driving and concurrent ETOH use with taking medication.  To avoid concurrent use with Tylenol PM.  Need for office visit for med renewal discussed.\par -hx following with therapist, Dr. Boothe- declines f/u, feels not needed\par -Attending Millicent, encouraged\par -reports good social support\par -advised to f/u if sx's worsen\par -relaxation techniques reviewed, considering medication course\par \par HLD-- 7/18 Tchol 143  LDL 75 HDL 40; LFTs wnl\par -hx self d/c'd lovaza 1/14 due to cost (no hx adverse rxn)\par -on crestor\par -followed by cardio - seen 8/18 with labs done 7/18 reviewed and no changes made per pt, hx negative screening stress test 8/18.  To f /u 2/19 with labs prior planned- to bring results to CPE appt 2/19..\par \par CAD--hx stents; asx\par -on ASA, statin, ACE-I (started post-stent)\par -hx neg stress test 2016 per pt\par -followed by cardio (Dr. Brooks in [place]) - seen 8/18 with labs done 7/18 reviewed and no changes made per pt, reports awaiting screening stress test 8/18.  To f/u 2/19.\par \par pre-DM--12/17 A1c- 5.6 (was 5.7 prior)- resolved\par -ADA diet and exercise counseled\par \par hx glaucoma, detached retina repair--asx\par -followed by optho, seen 4/18, told stable per pt.  Seen q 6mo. \par \par hx left hand pain- dx'd L- de Quervain's and is s/p injection x 2 (last 10/18)- now resolved.  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 relation to detergent)\par -on topical steroid by derm prn- no recent use.  hx tx'd fungal infection in groin 10/18 by derm per pt- now asx.\par -followed by A/I - s/p negative patch testing, also found NOT allergic to PCN\par -advised prompt ER eval if any sob, facial swelling, throat discomfort etc.\par \par hx chronic right neck/shoulder/ left upper back radiculopathy pain, hip pain (told 2/2 lumbar stenosis by ortho per pt)- chronic, stable\par -followed by ortho at [facility], hx injection 11/15.  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  Plans for second opinion on HSS.\par -followed for hip/lumbar stenosis at [facility]- seen 2/18, doing home PT on own with improvement\par -awaiting physiatry eval (referred by ortho, Dr. Rosado 9/18)- pending\par -on Tylenol prn\par \par hx b/l hearing loss- stable\par -followed by ENT, hx hearing test 4/16--advised hearing aids, using since 4/17.  \par -advised to avoid qtips, use debrox with cotton prn\par \par \par HCM\par --hx CPE 12/17, yearly advised with screening labs.\par --hx flu shot 9/18\par --hx PVX 4/11\par --hx prevnar 11/15\par --hx Tdap 7/14\par --hx shingles vaccine 7/20/11\par --hx shingrix series at pharmacy, finished 1/19\par --hx colonoscopy 2014, followed by GI for hx polyps for repeat 5 yrs (2019)\par --followed by derm, yearly screening advised- last 5/18 per pt.  Regular use of sun block for skin cancer prevention counseled.\par --HCP: sonChip- has copy at home, asked to bring copy for chart\par \par \par Pt's cell: [phone]\par Pt has office f/u for CPE and screening labs 2/28/19 (pt defers labs to then, also to get labs per cardio 2/19 will forward results).\par To f/u n 1mo for anxiety med refill.\par

## 2019-01-14 ENCOUNTER — APPOINTMENT (OUTPATIENT)
Dept: UROLOGY | Facility: CLINIC | Age: 74
End: 2019-01-14
Payer: MEDICARE

## 2019-01-14 VITALS
WEIGHT: 160 LBS | HEIGHT: 66 IN | SYSTOLIC BLOOD PRESSURE: 126 MMHG | RESPIRATION RATE: 16 BRPM | DIASTOLIC BLOOD PRESSURE: 69 MMHG | BODY MASS INDEX: 25.71 KG/M2 | HEART RATE: 80 BPM | TEMPERATURE: 98.2 F

## 2019-01-14 PROCEDURE — 99203 OFFICE O/P NEW LOW 30 MIN: CPT

## 2019-01-14 NOTE — REVIEW OF SYSTEMS
[see HPI] : see HPI [Seen by urologist before (Name)  ___] : Preciously seen by a urologist: [unfilled] [Wake up at night to urinate  How many times?  ___] : wakes up to urinate [unfilled] times during the night [Joint Pain] : joint pain [Negative] : Heme/Lymph

## 2019-01-14 NOTE — LETTER BODY
[FreeTextEntry1] : Martha Ulloa MD\par [address], \[place] [address]\par [phone]\par \par Dear Dr. Ulloa,\par \par Reason for Visit: BPH.\par \par This is a 73 year old gentleman with symptoms of BPH. Patient is here today for evaluation. Patient reports he has weak uroflow, frequency, and hesitancy. He denies any hematuria or urinary incontinence. His symptoms are aggravated by hydration. He denies any alleviating factors. He report taking Mybetriq and Oxybutynin with slight improvement of symptoms. He reports no pain. All other review of systems are negative. He has no cancer in his family medical history. He previously had cardiac catheter stent placement and colonoscopy. Past medical history, family history and social history were inquired and were noncontributory to current condition. The patient does not use tobacco or drink alcohol. Medications and allergies were reviewed. He has no known allergies to medication. \par \par On examination, the patient is a elderly-appearing gentleman in no acute distress. He is alert and oriented follows commands. He has normal mood and affect. He is normocephalic. Neck is supple. Oral no thrush Respirations are unlabored. His abdomen is soft and nontender. Bladder is nonpalpable. No CVA tenderness. Neurologically he is grossly intact. No peripheral edema. Skin without gross abnormality. He has normal male external genitalia. Normal meatus. Bilateral testes are descended intrascrotally and normal to palpation. On rectal examination, there is normal sphincter tone. The prostate is clinically benign without focal induration or nodularity.\par \par Post-void residual on bladder scan today was 0 cc.\par \par ASSESSMENT: BPH\par \par I counseled the patient on the various etiology of his symptoms. I discussed the natural history of BPH and the treatment options available. I discussed the options of conservative management with fluid in dietary restrictions, herbal therapy, medical therapy, and minimally invasive procedures. I recommended he continue Oxybutynin 5 mg. I discussed the potential side effects of the medication. I counseled the patient on its use and side effects. If the patient develops any side effects, the patient will discontinue the medication and contact me. The patient will obtain BMP, PSA and Urinalysis to ensure stability. Risks and alternatives were discussed. I answered the patient questions. The patient will follow-up as directed and will contact me with any questions or concerns. Thank you for the opportunity to participate in the care of Mr. SOTELO. I will keep you updated on his progress.\par \par Plan: BMP. PSA. Urinalysis. Follow up in 1 year.

## 2019-01-14 NOTE — LETTER BODY
[FreeTextEntry1] : Mratha Ulloa MD\par [address], \[place] [address]\par [phone]\par \par Dear Dr. Ulloa,\par \par Reason for Visit: BPH.\par \par This is a 73 year old gentleman with symptoms of BPH. Patient is here today for evaluation. Patient reports he has weak uroflow, frequency, and hesitancy. He denies any hematuria or urinary incontinence. His symptoms are aggravated by hydration. He denies any alleviating factors. He report taking Mybetriq and Oxybutynin with slight improvement of symptoms. He reports no pain. All other review of systems are negative. He has no cancer in his family medical history. He previously had cardiac catheter stent placement and colonoscopy. Past medical history, family history and social history were inquired and were noncontributory to current condition. The patient does not use tobacco or drink alcohol. Medications and allergies were reviewed. He has no known allergies to medication. \par \par On examination, the patient is a elderly-appearing gentleman in no acute distress. He is alert and oriented follows commands. He has normal mood and affect. He is normocephalic. Neck is supple. Oral no thrush Respirations are unlabored. His abdomen is soft and nontender. Bladder is nonpalpable. No CVA tenderness. Neurologically he is grossly intact. No peripheral edema. Skin without gross abnormality. He has normal male external genitalia. Normal meatus. Bilateral testes are descended intrascrotally and normal to palpation. On rectal examination, there is normal sphincter tone. The prostate is clinically benign without focal induration or nodularity.\par \par Post-void residual on bladder scan today was 0 cc.\par \par ASSESSMENT: BPH\par \par I counseled the patient on the various etiology of his symptoms. I discussed the natural history of BPH and the treatment options available. I discussed the options of conservative management with fluid in dietary restrictions, herbal therapy, medical therapy, and minimally invasive procedures. I recommended he continue Oxybutynin 5 mg. I discussed the potential side effects of the medication. I counseled the patient on its use and side effects. If the patient develops any side effects, the patient will discontinue the medication and contact me. The patient will obtain BMP, PSA and Urinalysis to ensure stability. Risks and alternatives were discussed. I answered the patient questions. The patient will follow-up as directed and will contact me with any questions or concerns. Thank you for the opportunity to participate in the care of Mr. SOTELO. I will keep you updated on his progress.\par \par Plan: BMP. PSA. Urinalysis. Follow up in 1 year.

## 2019-01-14 NOTE — ADDENDUM
[FreeTextEntry1] : Entered by Gayle Duff, acting as scribe for Dr. Andrzej Ortiz.\par \par The documentation recorded by the scribe accurately reflects the service I personally performed and the decisions made by me.

## 2019-01-15 LAB
ANION GAP SERPL CALC-SCNC: 13 MMOL/L
APPEARANCE: CLEAR
BACTERIA: NEGATIVE
BILIRUBIN URINE: NEGATIVE
BLOOD URINE: ABNORMAL
BUN SERPL-MCNC: 13 MG/DL
CALCIUM OXALATE CRYSTALS: ABNORMAL
CALCIUM SERPL-MCNC: 8.9 MG/DL
CHLORIDE SERPL-SCNC: 103 MMOL/L
CO2 SERPL-SCNC: 24 MMOL/L
COLOR: YELLOW
CREAT SERPL-MCNC: 0.88 MG/DL
GLUCOSE QUALITATIVE U: NEGATIVE MG/DL
GLUCOSE SERPL-MCNC: 136 MG/DL
KETONES URINE: NEGATIVE
LEUKOCYTE ESTERASE URINE: NEGATIVE
MICROSCOPIC-UA: NORMAL
NITRITE URINE: NEGATIVE
PH URINE: 6
POTASSIUM SERPL-SCNC: 4.1 MMOL/L
PROTEIN URINE: NEGATIVE MG/DL
PSA FREE FLD-MCNC: 26 %
PSA FREE SERPL-MCNC: 0.54 NG/ML
PSA SERPL-MCNC: 2.07 NG/ML
RED BLOOD CELLS URINE: 2 /HPF
SODIUM SERPL-SCNC: 140 MMOL/L
SPECIFIC GRAVITY URINE: 1.02
SQUAMOUS EPITHELIAL CELLS: 1 /HPF
UROBILINOGEN URINE: NEGATIVE MG/DL
WHITE BLOOD CELLS URINE: 1 /HPF

## 2019-02-11 ENCOUNTER — APPOINTMENT (OUTPATIENT)
Dept: INTERNAL MEDICINE | Facility: CLINIC | Age: 74
End: 2019-02-11

## 2019-02-28 ENCOUNTER — APPOINTMENT (OUTPATIENT)
Dept: INTERNAL MEDICINE | Facility: CLINIC | Age: 74
End: 2019-02-28

## 2019-04-25 ENCOUNTER — APPOINTMENT (OUTPATIENT)
Dept: INTERNAL MEDICINE | Facility: CLINIC | Age: 74
End: 2019-04-25
Payer: MEDICARE

## 2019-04-25 VITALS
SYSTOLIC BLOOD PRESSURE: 115 MMHG | TEMPERATURE: 98.3 F | HEIGHT: 66 IN | HEART RATE: 75 BPM | WEIGHT: 158 LBS | DIASTOLIC BLOOD PRESSURE: 70 MMHG | OXYGEN SATURATION: 98 % | BODY MASS INDEX: 25.39 KG/M2

## 2019-04-25 PROCEDURE — 99214 OFFICE O/P EST MOD 30 MIN: CPT

## 2019-04-25 NOTE — HISTORY OF PRESENT ILLNESS
[FreeTextEntry8] : pt here for refill of  lorazepam 0.5 mg hs prn  Last seen in Jan  given same 30  Pt takes  lorazepam  hs for anxiety over son who is alcoholic

## 2019-04-30 ENCOUNTER — APPOINTMENT (OUTPATIENT)
Dept: GASTROENTEROLOGY | Facility: CLINIC | Age: 74
End: 2019-04-30
Payer: MEDICARE

## 2019-04-30 VITALS
HEIGHT: 66 IN | TEMPERATURE: 98.2 F | BODY MASS INDEX: 25.39 KG/M2 | DIASTOLIC BLOOD PRESSURE: 80 MMHG | HEART RATE: 74 BPM | OXYGEN SATURATION: 98 % | SYSTOLIC BLOOD PRESSURE: 130 MMHG | RESPIRATION RATE: 16 BRPM | WEIGHT: 158 LBS

## 2019-04-30 DIAGNOSIS — M79.642 PAIN IN LEFT HAND: ICD-10-CM

## 2019-04-30 DIAGNOSIS — M25.512 PAIN IN RIGHT SHOULDER: ICD-10-CM

## 2019-04-30 DIAGNOSIS — M25.511 PAIN IN RIGHT SHOULDER: ICD-10-CM

## 2019-04-30 DIAGNOSIS — Z86.010 PERSONAL HISTORY OF COLONIC POLYPS: ICD-10-CM

## 2019-04-30 PROCEDURE — 99204 OFFICE O/P NEW MOD 45 MIN: CPT

## 2019-04-30 RX ORDER — BETAMETHASONE DIPROPIONATE 0.5 MG/G
0.05 CREAM, AUGMENTED TOPICAL TWICE DAILY
Qty: 80 | Refills: 1 | Status: DISCONTINUED | COMMUNITY
Start: 2017-12-21 | End: 2019-04-30

## 2019-04-30 RX ORDER — OXYBUTYNIN CHLORIDE 5 MG/1
5 TABLET, EXTENDED RELEASE ORAL
Qty: 30 | Refills: 3 | Status: DISCONTINUED | COMMUNITY
Start: 2019-01-08 | End: 2019-04-30

## 2019-04-30 NOTE — LETTER CLOSING
[Sincerely,] : Sincerely, [Matt Cano MD, FACP, FACG, FASENMA, AGAF] : Matt Cano MD, FACP, FACG, ELÍAS, AGAF [Associate Professor of Medicine] : Associate Professor of Medicine [[facility]] : [facility]

## 2019-05-01 PROBLEM — M25.511 BILATERAL SHOULDER PAIN: Status: RESOLVED | Noted: 2017-04-28 | Resolved: 2019-05-01

## 2019-05-01 PROBLEM — M79.642 HAND PAIN, LEFT: Status: RESOLVED | Noted: 2018-08-14 | Resolved: 2019-05-01

## 2019-05-08 NOTE — CONSULT LETTER
[Dear  ___] : Dear  [unfilled], [Consult Letter:] : I had the pleasure of evaluating your patient, [unfilled]. [Please see my note below.] : Please see my note below. [Sincerely,] : Sincerely, [FreeTextEntry3] : MD ELÍAS Mathis [facility]\par Associate Professor of Medicine\par [facility] [employer] [facility]  [FreeTextEntry2] : Martha Ulloa MD

## 2019-05-08 NOTE — LETTER GREETING
[Dear  ___] : Dear  [unfilled], [FreeTextEntry4] : Anika Barr MD [FreeTextEntry5] : [address] [FreeTextEntry6] : [place] [address]

## 2019-05-08 NOTE — HISTORY OF PRESENT ILLNESS
[Heartburn] : denies heartburn [Nausea] : denies nausea [Vomiting] : denies vomiting [Diarrhea] : denies diarrhea [Constipation] : denies constipation [Yellow Skin Or Eyes (Jaundice)] : denies jaundice [Abdominal Pain] : abdominal pain [de-identified] : Ricky Cross, a 74 year old man is seen for evaluation of colon polyps.   The colonoscopy in 2009 revealed 2 adenomatous polyps.   A follow-up colonoscopy in 2010  revealed residual polyp which was resected in piece meal technique.  The  surveillance colonoscopy 2012 revealed 3 small tubular adenomas.   The last colonoscopy in 2014 was negative for adenomas.    The patient denies heartburn, indigestion, bloating, dysphagia, change in bowel habit, abdominal pain, hematochezia, melena, or jaundice.  He had a cardiac catherization for fatigue  in April 2019 at [facility]. \par \par He had had  right lower quadrant discomfort in April 2014 which has resolved.  He then had left flank pain in August 2014 when he awakened in the morning.  He ascribes it to his disk disease.  The pain is not related to movement, bowel movements or eating.  The abdominal and pelvic ultrasound from 2014 were both negative except for fatty liver and mild prostatic enlargement.  A urinalysis in January 2019 revealed oxalate crystals and microscopic hematuria consistent with renal stones.  The hepatic profile from 2018 was within normal.  He continues to take 81 mg aspirin daily for cardiac prophylaxis.\par \par There is a family history of pancreatic cancer in the father.  His mother had breast cancer.  There is no other family history of colon cancer, colon polyp, peptic ulcer disease, female genitourinary disease, liver disease, cholelithiasis, pancreatic disease or cancer, inflammatory bowel disease or celiac disease.,

## 2019-05-08 NOTE — DISCUSSION/SUMMARY
[FreeTextEntry1] : 69 year old man with past history of ascending colon polyps; one resected in piece meal fashion with residual polyp resected again in 2010 and 2012.  The right lower quadrant pain and tenderness has resolved.  The cbc and CMP are within normal.\par Sonography revealed fatty liver, but transaminases are within normal.  \par Recommendation:\par 1) monitor for further symptoms\par 2) repeat colonoscopy to evaluate previous polypectomy site in ascending colon at 3 years, 2015\par 3) patient cautioned about taking meloxicam frequently while also taking aspirin due to risk for NSAID gastroapthy.

## 2019-05-08 NOTE — ASSESSMENT
[FreeTextEntry1] : Assessment\par 1) increased risk for adenomatous polyps due to personal history with negative colonoscopy in 2014\par 2) mildly overweight with BMI > 25; however, patient has fatty liver by sonography\par \par Plan\par 1)  Colonoscopy risks, benefits and preparation discussed with the patient\par 2)  Dietary strategies discussed with the patient including use of psyllium husk before breakfast and supper; mild exercise also discussed; weight once weekly; think of calory spending \par 3) consider abdominal sonogram to monitor for fatty liver\par 4) office followup

## 2019-05-08 NOTE — REVIEW OF SYSTEMS
[As Noted in HPI] : as noted in HPI [Joint Pain] : joint pain [Negative] : Heme/Lymph [FreeTextEntry9] : herniated disk disease [FreeTextEntry8] : UTI in January 2014

## 2019-06-21 ENCOUNTER — RESULT REVIEW (OUTPATIENT)
Age: 74
End: 2019-06-21

## 2019-06-21 ENCOUNTER — OUTPATIENT (OUTPATIENT)
Dept: OUTPATIENT SERVICES | Facility: HOSPITAL | Age: 74
LOS: 1 days | Discharge: HOME | End: 2019-06-21
Payer: MEDICARE

## 2019-06-21 ENCOUNTER — APPOINTMENT (OUTPATIENT)
Dept: GASTROENTEROLOGY | Facility: HOSPITAL | Age: 74
End: 2019-06-21

## 2019-06-21 DIAGNOSIS — D36.9 BENIGN NEOPLASM, UNSPECIFIED SITE: ICD-10-CM

## 2019-06-21 DIAGNOSIS — K46.9 UNSPECIFIED ABDOMINAL HERNIA WITHOUT OBSTRUCTION OR GANGRENE: Chronic | ICD-10-CM

## 2019-06-21 PROCEDURE — 88305 TISSUE EXAM BY PATHOLOGIST: CPT | Mod: 26

## 2019-06-21 PROCEDURE — 45380 COLONOSCOPY AND BIOPSY: CPT | Mod: GC

## 2019-06-21 RX ORDER — SODIUM CHLORIDE 9 MG/ML
1000 INJECTION, SOLUTION INTRAVENOUS
Refills: 0 | Status: DISCONTINUED | OUTPATIENT
Start: 2019-06-21 | End: 2019-07-06

## 2019-06-21 RX ADMIN — SODIUM CHLORIDE 30 MILLILITER(S): 9 INJECTION, SOLUTION INTRAVENOUS at 11:52

## 2019-06-24 LAB — SURGICAL PATHOLOGY STUDY: SIGNIFICANT CHANGE UP

## 2019-08-12 PROBLEM — Z86.010 HISTORY OF COLON POLYPS: Status: RESOLVED | Noted: 2019-08-12 | Resolved: 2019-08-12

## 2019-08-13 ENCOUNTER — APPOINTMENT (OUTPATIENT)
Dept: OTOLARYNGOLOGY | Facility: CLINIC | Age: 74
End: 2019-08-13
Payer: MEDICARE

## 2019-08-13 ENCOUNTER — APPOINTMENT (OUTPATIENT)
Dept: INTERNAL MEDICINE | Facility: CLINIC | Age: 74
End: 2019-08-13
Payer: MEDICARE

## 2019-08-13 VITALS
WEIGHT: 160 LBS | TEMPERATURE: 97.8 F | BODY MASS INDEX: 25.71 KG/M2 | DIASTOLIC BLOOD PRESSURE: 62 MMHG | SYSTOLIC BLOOD PRESSURE: 130 MMHG | OXYGEN SATURATION: 98 % | HEART RATE: 72 BPM | RESPIRATION RATE: 16 BRPM | HEIGHT: 66 IN

## 2019-08-13 VITALS
HEIGHT: 66 IN | HEART RATE: 78 BPM | WEIGHT: 160 LBS | SYSTOLIC BLOOD PRESSURE: 107 MMHG | DIASTOLIC BLOOD PRESSURE: 61 MMHG | BODY MASS INDEX: 25.71 KG/M2

## 2019-08-13 DIAGNOSIS — J34.3 HYPERTROPHY OF NASAL TURBINATES: ICD-10-CM

## 2019-08-13 DIAGNOSIS — Z86.010 PERSONAL HISTORY OF COLONIC POLYPS: ICD-10-CM

## 2019-08-13 PROCEDURE — 69210 REMOVE IMPACTED EAR WAX UNI: CPT

## 2019-08-13 PROCEDURE — 31231 NASAL ENDOSCOPY DX: CPT

## 2019-08-13 PROCEDURE — 99214 OFFICE O/P EST MOD 30 MIN: CPT

## 2019-08-13 PROCEDURE — 99204 OFFICE O/P NEW MOD 45 MIN: CPT | Mod: 25

## 2019-08-13 NOTE — PHYSICAL EXAM
[Midline] : trachea located in midline position [Normal] : no rashes [de-identified] : AU: CI, mild erythema of EAC

## 2019-08-13 NOTE — CONSULT LETTER
[FreeTextEntry1] : Dear Dr. MERVAT HENLEY \par I had the pleasure of evaluating your patient DUC SOTELO  thank you for allowing us to participate in their care. please see full note detailing our visit below.\par If you have any questions, please do not hesitate to call me and I would be happy to discuss further. \par \par Johann Frederick M.D.\par Attending Physician,  \par Department of Otolaryngology - Head and Neck Surgery\par [facility] \par Office: [phone]\par Fax: [phone]\par

## 2019-08-13 NOTE — ASSESSMENT
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 Will start regiment to see if may improve symptoms and escalate if needed \par - Will start Flonase. A topical steroid reduce mucosal swelling, illustrated appropriate use and how to reduce the risk of bleeding \par - Nasal irrigation and showed how to use it to maximize effectiveness \par \par \par \par I personally saw and examined DUC SOTELO in detail. I spoke to CAMRYN Martinez regarding the assessment and plan of care.  I preformed the procedures and I reviewed the above assessment and plan of care, and agree. I have made changes in changes in the body of the note where appropriate.\par \par

## 2019-08-13 NOTE — PROCEDURE
[FreeTextEntry3] : Procedure - Cerumen Removal. \par After informed verbal consent is obtained, cerumen is removed from the b/l ear canal with suction.  Normal appearing canal following removal.\par  [FreeTextEntry6] : Procedure performed: Nasal Endoscopy- Diagnostic\par Pre / post -procedure indication(s): nasal congestion\par Verbal and/or written consent obtained from patient\par Scope #: 19,  flexible fiber optic telescope used\par The scope was introduced in the nasal passage between the middle and inferior turbinates to exam the inferior portion of the middle meatus and the fontanelle, as well as the maxillary ostia.  Next, the scope was passed medically and posteriorly to the middle turbinates to examine the sphenoethmoid recess and the superior turbinate region.\par Upon visualization the finders are as follows:\par Nasal Septum: sigmoidal septal deviation \par B/L: Mucosa: pink and moist, Mucous: scant, Polyp: not seen, Inferior Turbinate, Middle and Superior Turbinate: inferior turbinate hypertrophy Inferior Meatus: narrow, Middle Meatus: narrow, Super Meatus:normal, Sphenoethmoidal Recess: clear.  Eustachian tube clear. \par The patient tolerated the procedure well\par

## 2019-08-13 NOTE — HISTORY OF PRESENT ILLNESS
[de-identified] : 73 y/o male with known bilat. SNHL, wears hearing aids bilat for the past 2 years with significant improvement in hearing. Last hearing test was over 1.5 years ago and hasn't had adjustment since. He notes a decrease in hearing in his right ear for the past year and that hearing in right ear is worse than left. Recently had outer ear infection in right ear 2 weeks ago- treated with 7 days topical abx with resolution.\par No pain, drainage, pressure, tinnitus AU. No vertigo. \par \par Also with recurrent episodes of sinus pressure/fullness under eyes and runny nose for the past year.\par + intermittent bloody mucus from right side of nose- 1-2 times a week- occurs when blowing his nose\par No nasal congestion or obstruction. No recurrent sinus infections.

## 2019-08-16 NOTE — HISTORY OF PRESENT ILLNESS
[de-identified] : last ate 1 hr ago\par needs med refill\par \par tried medical marijuana in 5/19, took as capsules for shoulder pain- took x 1 week, then d/c'd as " I did not like it" felt mentally/sinus stuffiness\par \par has lab slip from cardio, plans to do prior to visit 8/26/19- declines labs today, \par needs letter to rent a car in [place]- needs letter stating in good health\par going [place] and [place] end of the month\par using lorazepam on/off, using sparingly for sleep\par doing meditation, took a course\par same issues with son\par stays active\par  [FreeTextEntry1] : \par Feeling well\par Needs med refills.\par Not fasting- declines labs today, defers to do slip has from cardiology which plans to do prior to 8/26/19 visit with cardio and forward records \par \par Needs letter to rent a car in [place]- states letter should state is in good health, no other specifics need to be mentioned per pt\par -states going [place] and [place] end of the month to visit family\par \par hx urine frequency- stable\par -followed by [facility], seen by new 4/19 when told likely 2/2 BPH and advised to cont oxybutynin\par -off oxybutynin now, self d/c'd since visit as did not like taking it- declines medication use at this time\par -denies abd pain, n/v, gross hematuria, hesitancy, dysuria or LBP.  \par \par hx b/l shoulder pain on/off- stable\par -mainly when lying on either shoulder- hx PT w/o help as referred by ortho in past\par -no pain med used, except Tylenol PM on occasion\par -seen by ortho 5/17, told cervical DJD and tendonitis with naproxen/PT advised- pt declines.  Advised physiatry eval per ortho - declines\par -states tried medical marijuana in 5/19, took as capsules- took x 1 week, then d/c'd as " I did not like it" felt mentally/sinus stuffiness.  Does not plan to restart.\par \par hx anxiety/depression- stable, gets anxious on/off, no recent panic attacks; denies depression or SI\par -on lorazepam though using sparingly (last Rx 4/19 #30).  \par -Still meditating- using "head space cheikh", which likes very much.  \par -still not interested in taking Rx for mood regularly or seeing a psychiatrist\par -hx seeing therapist, felt did not help him much and does not plan to return\par -remains socially active, has close friends and family nearby.  Attends social events (dances, movies, etc.) with friends.  Still listening to [employer] radio and found a very soothing station feels helps his mood tremendously.  Sleeping okay, occ Tylenol PM.\par -sx onset 2/2 adopted son who is in and out of rehab due to alcoholism, also in/out of hospital for other chronic medical issues, feels they have good relationship, feels safe.  Son does not live with him.  States son now out of work and still drinking, not going to rehab.  \par -attends [employer] on occasion, admits "brings him down" when he attends\par \par HLD, CAD- \par -taking crestor w/o missed doses and tries to eat low fat diet. \par -followed by cardio, hx negative cath 4/19 done for fatigue per pt- notes since resolved. No med change made since last visit.  Has f/u 8/19 with labs planned prior.\par -walks 2-3x/d x 45 min w/o sx's or limitations.\par -denies dizziness, CP, palpitations, sob or LE edema.\par \par preDM-\par -reports ADA diet, exercising\par \par hx hearing loss- using hearing aides\par -followed by ENT, has f/u with new today\par \par hx screening colonoscopy 6/19, colon polyps (Dr. Cano)\par -awaiting US for f/u of hx fatty liver\par -reports nl appetite and BMs.\par -denies any GI complaints.\par \par Hx glaucoma-\par -followed by optho, on gtt\par \par independent with ADLs\par denies imbalance or falls\par driving w/o issue\par HCP: Chip torres- has copy at home\par \par \par

## 2019-08-16 NOTE — ASSESSMENT
[FreeTextEntry1] : \par hx anxiety/depression- 2/2 social issues (son with cancer and hx ETOH abuse); improved- stable\par -off zoloft 2/2 SEs, declines trial of alternate medication or psychiatry referral\par -on lorazepam qhs prn, refilled today (istop checked). Risks/side effects discussed.  Advised to avoid driving and concurrent ETOH use with taking medication.  To avoid concurrent use with Tylenol PM.  Need for office visit for med renewal discussed.  Avoidance of use when driving counseled.\par -hx following with therapist, Dr. Boothe- declines f/u, feels not needed\par -Attending [employer], encouraged\par -reports good social support\par -advised to f/u if sx's worsen\par -relaxation techniques reviewed, considering medication course\par \par CAD hx stents, HTN, HLD- 7/18 Tchol 143  LDL 75 HDL 40; LFTs wnl; asx\par -hx self d/c'd lovaza 1/14 due to cost (no hx adverse rxn)\par -on crestor, ASA and ramipril\par -hx negative stress test 2016 per pt\par -hx negative cath 4/19\par -followed by cardio (Dr. Brooks in [place]) - hx negative cath 4/19 done for fatigue per pt- notes since resolved. No med change made since last visit.  Has f/u 8/19 with labs planned prior.\par -1/19 bmp wnl, UA no prt\par \par preDM- 12/17 A1c- 5.6 (was 5.7 prior)- resolved\par -ADA diet and exercise counseled\par -check A1c next visit if not done by cardio with planned 8/19 labs\par \par hx microscopic hematuria, frequency- off myrbetriq 2/2 constipation\par -4/14 pelvic sono with mild prostate enlargement\par -followed by , seen seen by new 4/19 when told likely 2/2 BPH and advised to cont oxybutynin\par -off oxybutynin now, self d/c'd since visit as did not like taking it- declines medication use at this time\par -1/19 bmp/UA/PSA unremarkable\par -advised to f/u if sx's worsen, dysuria, hesitancy, etc.\par \par hx fatty liver- asx\par -followed by GI, Dr. Cano\par -awaiting US abdomen\par -low fat diet, exercise advised\par \par hx glaucoma, detached retina repair--asx\par -followed by optho, on gtt. Seen q 6mo. \par \par hx left hand pain- dx'd L- de Quervain's and is s/p injection x 2 (last 10/18)- now resolved.  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 relation to detergent)\par -on topical steroid by derm prn- no recent use.  hx tx'd fungal infection in groin 10/18 by derm per pt- now asx.\par -followed by A/I - s/p negative patch testing, also found NOT allergic to PCN\par -advised prompt ER eval if any sob, facial swelling, throat discomfort etc.\par \par hx chronic right neck/shoulder/ left upper back radiculopathy pain, hip pain (told 2/2 lumbar stenosis by ortho per pt)- chronic, stable\par -followed by ortho at [facility], hx injection 11/15.  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  Plans for second opinion on HSS.\par -[facility], doing home PT on own with improvement\par -awaiting physiatry eval (referred by ortho, Dr. Rosado 9/18)- pending\par -on Tylenol prn\par \par hx b/l hearing loss- stable\par -hx followed by ENT, hx hearing test 4/16- has  hearing aids, has appt with new ENT today \par -advised to avoid qtips, use debrox with cotton prn\par \par MISC: requested medical letter given to pt today\par \par \par HCM\par -hx CPE 12/17, yearly advised with screening labs.\par -hx flu shot 9/18\par -hx PVX 4/11\par -hx prevnar 11/15\par -hx Tdap 7/14\par -hx shingles vaccine 7/20/11\par -hx shingrix series at pharmacy, finished 1/19\par -hx colonoscopy 6/19: colon polyps (TA/hyperplastic) (Dr. Cano)\par -followed by derm, yearly screening advised.  Regular use of sun block for skin cancer prevention counseled.\par -HCP: sonChip- has copy at home, asked to bring copy for chart\par \par Pt's cell: [phone]

## 2019-08-16 NOTE — REVIEW OF SYSTEMS
[see HPI] : see HPI [Negative] : Respiratory [Feeling Poorly] : not feeling poorly [Earache] : no earache [Chest Pain] : no chest pain [Palpitations] : no palpitations [Lower Ext Edema] : no extremity edema [Cough] : no cough [Orthopnea] : no orthopnea [SOB on Exertion] : no shortness of breath during exertion [PND] : no PND [Abdominal Pain] : no abdominal pain [Melena] : no melena [Dysuria] : no dysuria [Dizziness] : no dizziness [Limb Weakness] : no limb weakness [Suicidal] : not suicidal [Depression] : no depression

## 2019-08-16 NOTE — PHYSICAL EXAM
[General Appearance - Alert] : alert [General Appearance - In No Acute Distress] : in no acute distress [General Appearance - Well-Appearing] : healthy appearing [Sclera] : the sclera and conjunctiva were normal [PERRL With Normal Accommodation] : pupils were equal in size, round, and reactive to light [Extraocular Movements] : extraocular movements were intact [Outer Ear] : the ears and nose were normal in appearance [Nasal Cavity] : the nasal mucosa and septum were normal [Oropharynx] : the oropharynx was normal [Neck Appearance] : the appearance of the neck was normal [Thyroid Diffuse Enlargement] : the thyroid was not enlarged [Auscultation Breath Sounds / Voice Sounds] : lungs were clear to auscultation bilaterally [Respiration, Rhythm And Depth] : normal respiratory rhythm and effort [Heart Rate And Rhythm] : heart rate was normal and rhythm regular [Heart Sounds] : normal S1 and S2 [Murmurs] : no murmurs [Full Pulse] : the pedal pulses are present [Edema] : there was no peripheral edema [Bowel Sounds] : normal bowel sounds [Abdomen Tenderness] : non-tender [Abdomen Soft] : soft [Cervical Lymph Nodes Enlarged Anterior Bilaterally] : anterior cervical [Cervical Lymph Nodes Enlarged Posterior Bilaterally] : posterior cervical [No CVA Tenderness] : no ~M costovertebral angle tenderness [Supraclavicular Lymph Nodes Enlarged Bilaterally] : supraclavicular [No Spinal Tenderness] : no spinal tenderness [Abnormal Walk] : normal gait [Musculoskeletal - Swelling] : no joint swelling seen [] : no rash [No Focal Deficits] : no focal deficits [Affect] : the affect was normal [Oriented To Time, Place, And Person] : oriented to person, place, and time [Mood] : the mood was normal [FreeTextEntry1] : scattered freckles

## 2019-08-27 ENCOUNTER — APPOINTMENT (OUTPATIENT)
Dept: GASTROENTEROLOGY | Facility: CLINIC | Age: 74
End: 2019-08-27
Payer: MEDICARE

## 2019-08-27 VITALS
HEART RATE: 77 BPM | BODY MASS INDEX: 26.36 KG/M2 | DIASTOLIC BLOOD PRESSURE: 70 MMHG | HEIGHT: 66 IN | TEMPERATURE: 97.9 F | OXYGEN SATURATION: 98 % | SYSTOLIC BLOOD PRESSURE: 110 MMHG | RESPIRATION RATE: 16 BRPM | WEIGHT: 164 LBS

## 2019-08-27 DIAGNOSIS — Z87.898 PERSONAL HISTORY OF OTHER SPECIFIED CONDITIONS: ICD-10-CM

## 2019-08-27 DIAGNOSIS — R20.2 ANESTHESIA OF SKIN: ICD-10-CM

## 2019-08-27 DIAGNOSIS — R20.0 ANESTHESIA OF SKIN: ICD-10-CM

## 2019-08-27 DIAGNOSIS — H61.23 IMPACTED CERUMEN, BILATERAL: ICD-10-CM

## 2019-08-27 PROCEDURE — 99214 OFFICE O/P EST MOD 30 MIN: CPT

## 2019-08-27 NOTE — LETTER CLOSING
[Sincerely,] : Sincerely, [Matt Cano MD, FACP, FACG, FASENMA, AGAF] : Matt Cano MD, FACP, FACG, ELÍAS, AGAF [Associate Professor of Medicine] : Associate Professor of Medicine [[facility]] : [facility]

## 2019-08-30 PROBLEM — Z87.898 HISTORY OF EPISTAXIS: Status: RESOLVED | Noted: 2019-08-13 | Resolved: 2019-08-30

## 2019-08-30 PROBLEM — R20.0 NUMBNESS AND TINGLING: Status: RESOLVED | Noted: 2018-09-26 | Resolved: 2019-08-30

## 2019-08-30 NOTE — CONSULT LETTER
[Dear  ___] : Dear  [unfilled], [Consult Letter:] : I had the pleasure of evaluating your patient, [unfilled]. [Please see my note below.] : Please see my note below. [Sincerely,] : Sincerely, [FreeTextEntry2] : Martha Ulloa MD [FreeTextEntry3] : MD ELÍAS Mathis [facility]\par Associate Professor of Medicine\par [facility] [employer] [facility]

## 2019-08-30 NOTE — LETTER GREETING
[Dear  ___] : Dear  [unfilled], [FreeTextEntry4] : Anika Barr MD [FreeTextEntry5] : [address] [FreeTextEntry6] : [place] [address]

## 2019-08-30 NOTE — ASSESSMENT
[FreeTextEntry1] : Assessment\par 1) tubular adenoma of the ascending colon\par 2) mildly overweight with BMI > 25; however, patient has fatty liver by sonography\par 3) nonalcoholic fatty liver disease, transaminases have been within normal, only a sonographic finding\par \par Plan\par 1)  Colonoscopy for polyp surveillance in 2024 depending upon patient's overall status\par 2)  Dietary strategies discussed again with the patient including use of psyllium husk before breakfast and supper; mild exercise also discussed; weight once weekly; think of calory spending \par 3) consider abdominal sonogram to monitor for fatty liver\par 4) office followup as needed

## 2019-08-30 NOTE — PHYSICAL EXAM
[General Appearance - Alert] : alert [General Appearance - In No Acute Distress] : in no acute distress [Sclera] : the sclera and conjunctiva were normal [PERRL With Normal Accommodation] : pupils were equal in size, round, and reactive to light [Examination Of The Oral Cavity] : the lips and gums were normal [Oropharynx] : the oropharynx was normal [Neck Appearance] : the appearance of the neck was normal [Neck Cervical Mass (___cm)] : no neck mass was observed [Jugular Venous Distention Increased] : there was no jugular-venous distention [Thyroid Diffuse Enlargement] : the thyroid was not enlarged [Thyroid Nodule] : there were no palpable thyroid nodules [Auscultation Breath Sounds / Voice Sounds] : lungs were clear to auscultation bilaterally [Heart Rate And Rhythm] : heart rate was normal and rhythm regular [Heart Sounds] : normal S1 and S2 [Heart Sounds Gallop] : no gallops [Murmurs] : no murmurs [Heart Sounds Pericardial Friction Rub] : no pericardial rub [Arterial Pulses Carotid] : carotid pulses were normal with no bruits [Abdominal Aorta] : the abdominal aorta was normal [Full Pulse] : the pedal pulses are present [Edema] : there was no peripheral edema [Bowel Sounds] : normal bowel sounds [Abdomen Soft] : soft [Abdomen Tenderness] : non-tender [Abdomen Mass (___ Cm)] : no abdominal mass palpated [Normal Sphincter Tone] : normal sphincter tone [No Rectal Mass] : no rectal mass [Prostate Enlargement] : the prostate was not enlarged [Cervical Lymph Nodes Enlarged Posterior Bilaterally] : posterior cervical [Cervical Lymph Nodes Enlarged Anterior Bilaterally] : anterior cervical [Supraclavicular Lymph Nodes Enlarged Bilaterally] : supraclavicular [Axillary Lymph Nodes Enlarged Bilaterally] : axillary [Femoral Lymph Nodes Enlarged Bilaterally] : femoral [Inguinal Lymph Nodes Enlarged Bilaterally] : inguinal [No CVA Tenderness] : no ~M costovertebral angle tenderness [No Spinal Tenderness] : no spinal tenderness [Abnormal Walk] : normal gait [Nail Clubbing] : no clubbing  or cyanosis of the fingernails [Musculoskeletal - Swelling] : no joint swelling seen [Motor Tone] : muscle strength and tone were normal [Skin Color & Pigmentation] : normal skin color and pigmentation [Skin Turgor] : normal skin turgor [] : no rash [Deep Tendon Reflexes (DTR)] : deep tendon reflexes were 2+ and symmetric [Sensation] : the sensory exam was normal to light touch and pinprick [No Focal Deficits] : no focal deficits [Oriented To Time, Place, And Person] : oriented to person, place, and time [Impaired Insight] : insight and judgment were intact [Affect] : the affect was normal [Internal Hemorrhoid] : no internal hemorrhoids [Occult Blood Positive] : stool was negative for occult blood [FreeTextEntry1] : no tenderness elicited in lower abdomen or prostate

## 2019-08-30 NOTE — REVIEW OF SYSTEMS
[As Noted in HPI] : as noted in HPI [Joint Pain] : joint pain [Negative] : Heme/Lymph [FreeTextEntry8] : UTI in January 2014 [FreeTextEntry9] : herniated disk disease

## 2019-08-30 NOTE — HISTORY OF PRESENT ILLNESS
[Heartburn] : denies heartburn [Nausea] : denies nausea [Vomiting] : denies vomiting [Diarrhea] : denies diarrhea [Constipation] : denies constipation [Yellow Skin Or Eyes (Jaundice)] : denies jaundice [Abdominal Pain] : abdominal pain [de-identified] : Ricky Cross, a 74 year old man is seen for follow-up evaluation of colon polyps.   The colonoscopy in  June 2019 revealed a small tubular adenoma n the ascending colon and small hyperplastic polyps in the ascending colon and rectum.  The colonoscopy in 2009 revealed 2 adenomatous polyps.   A follow-up colonoscopy in 2010  revealed residual polyp which was resected in piece meal technique.  The  surveillance colonoscopy 2012 revealed 3 small tubular adenomas.   The colonoscopy in 2014 was negative for adenomas.    The patient does not have any biologic children; therefore, his adopted son can wait until age 50 for screening.  The patient denies heartburn, indigestion, bloating, dysphagia, change in bowel habit, abdominal pain, hematochezia, melena, or jaundice.  He had a cardiac catherization for fatigue  in April 2019 at [facility]. \par \par He had had  right lower quadrant discomfort in April 2014 which has resolved.  He then had left flank pain in August 2014 when he awakened in the morning.  He ascribed it to his disk disease.  The pain is not related to movement, bowel movements or eating.  The abdominal and pelvic ultrasound from 2014 were both negative except for fatty liver and mild prostatic enlargement.  A urinalysis in January 2019 revealed oxalate crystals and microscopic hematuria consistent with renal stones.  The hepatic profile from 2018 was within normal.  He continues to take 81 mg aspirin daily for cardiac prophylaxis.\par \par There is a family history of pancreatic cancer in the father.  His mother had breast cancer.  There is no other family history of colon cancer, colon polyp, peptic ulcer disease, female genitourinary disease, liver disease, cholelithiasis, pancreatic disease or cancer, inflammatory bowel disease or celiac disease.,

## 2019-08-30 NOTE — REASON FOR VISIT
[Follow-Up: _____] : a [unfilled] follow-up visit [Initial Evaluation] : an initial evaluation [FreeTextEntry1] : colonic polyp

## 2019-09-19 ENCOUNTER — APPOINTMENT (OUTPATIENT)
Dept: INTERNAL MEDICINE | Facility: CLINIC | Age: 74
End: 2019-09-19
Payer: MEDICARE

## 2019-09-19 VITALS
RESPIRATION RATE: 16 BRPM | SYSTOLIC BLOOD PRESSURE: 122 MMHG | OXYGEN SATURATION: 98 % | TEMPERATURE: 97.8 F | HEART RATE: 76 BPM | DIASTOLIC BLOOD PRESSURE: 70 MMHG | WEIGHT: 160 LBS | HEIGHT: 66 IN | BODY MASS INDEX: 25.71 KG/M2

## 2019-09-19 DIAGNOSIS — Z00.00 ENCOUNTER FOR GENERAL ADULT MEDICAL EXAMINATION W/OUT ABNORMAL FINDINGS: ICD-10-CM

## 2019-09-19 DIAGNOSIS — J34.89 OTHER SPECIFIED DISORDERS OF NOSE AND NASAL SINUSES: ICD-10-CM

## 2019-09-19 DIAGNOSIS — Z86.69 PERSONAL HISTORY OF OTHER DISEASES OF THE NERVOUS SYSTEM AND SENSE ORGANS: ICD-10-CM

## 2019-09-19 DIAGNOSIS — Z87.898 PERSONAL HISTORY OF OTHER SPECIFIED CONDITIONS: ICD-10-CM

## 2019-09-19 DIAGNOSIS — M25.511 PAIN IN RIGHT SHOULDER: ICD-10-CM

## 2019-09-19 PROCEDURE — G0439: CPT

## 2019-09-19 PROCEDURE — G0444 DEPRESSION SCREEN ANNUAL: CPT | Mod: 59

## 2019-09-19 PROCEDURE — G0442 ANNUAL ALCOHOL SCREEN 15 MIN: CPT | Mod: 59

## 2019-09-19 PROCEDURE — 90662 IIV NO PRSV INCREASED AG IM: CPT

## 2019-09-19 PROCEDURE — G0008: CPT

## 2019-09-19 PROCEDURE — 36415 COLL VENOUS BLD VENIPUNCTURE: CPT

## 2019-09-20 LAB
25(OH)D3 SERPL-MCNC: 33 NG/ML
ALBUMIN SERPL ELPH-MCNC: 4.1 G/DL
ALP BLD-CCNC: 65 U/L
ALT SERPL-CCNC: 15 U/L
ANION GAP SERPL CALC-SCNC: 11 MMOL/L
AST SERPL-CCNC: 16 U/L
BASOPHILS # BLD AUTO: 0.05 K/UL
BASOPHILS NFR BLD AUTO: 0.6 %
BILIRUB SERPL-MCNC: 0.5 MG/DL
BUN SERPL-MCNC: 14 MG/DL
CALCIUM SERPL-MCNC: 9.2 MG/DL
CHLORIDE SERPL-SCNC: 101 MMOL/L
CO2 SERPL-SCNC: 27 MMOL/L
CREAT SERPL-MCNC: 0.86 MG/DL
EOSINOPHIL # BLD AUTO: 0.13 K/UL
EOSINOPHIL NFR BLD AUTO: 1.4 %
ESTIMATED AVERAGE GLUCOSE: 111 MG/DL
FOLATE SERPL-MCNC: >20 NG/ML
GLUCOSE SERPL-MCNC: 97 MG/DL
HBA1C MFR BLD HPLC: 5.5 %
HCT VFR BLD CALC: 45.3 %
HGB BLD-MCNC: 15.2 G/DL
IMM GRANULOCYTES NFR BLD AUTO: 0.4 %
LYMPHOCYTES # BLD AUTO: 1.18 K/UL
LYMPHOCYTES NFR BLD AUTO: 13.2 %
MAN DIFF?: NORMAL
MCHC RBC-ENTMCNC: 31 PG
MCHC RBC-ENTMCNC: 33.6 GM/DL
MCV RBC AUTO: 92.3 FL
MONOCYTES # BLD AUTO: 1.08 K/UL
MONOCYTES NFR BLD AUTO: 12 %
NEUTROPHILS # BLD AUTO: 6.49 K/UL
NEUTROPHILS NFR BLD AUTO: 72.4 %
PLATELET # BLD AUTO: 237 K/UL
POTASSIUM SERPL-SCNC: 4.4 MMOL/L
PROT SERPL-MCNC: 6.8 G/DL
RBC # BLD: 4.91 M/UL
RBC # FLD: 11.7 %
SODIUM SERPL-SCNC: 139 MMOL/L
VIT B12 SERPL-MCNC: 352 PG/ML
WBC # FLD AUTO: 8.97 K/UL

## 2019-09-21 PROBLEM — J34.89 SINUS PRESSURE: Status: RESOLVED | Noted: 2019-08-13 | Resolved: 2019-09-21

## 2019-09-21 PROBLEM — Z87.898 HISTORY OF URINARY FREQUENCY: Status: RESOLVED | Noted: 2017-08-17 | Resolved: 2019-09-21

## 2019-09-21 RX ORDER — POLYETHYLENE GLYCOL 3350, SODIUM SULFATE, SODIUM CHLORIDE, POTASSIUM CHLORIDE, ASCORBIC ACID, SODIUM ASCORBATE 7.5-2.691G
100 KIT ORAL
Qty: 1 | Refills: 0 | Status: DISCONTINUED | COMMUNITY
Start: 2019-04-30 | End: 2019-09-21

## 2019-09-21 NOTE — HISTORY OF PRESENT ILLNESS
[Health Maintenance] : health maintenance [Never] : has never used illicit drugs [Good] : good [Reg. Dental Visits] : He has regular dental visits [Hearing Loss] : He has hearing loss [Healthy Diet] : He consumes a diverse and healthy diet [Regular Exercise] : He exercises regularly [Former Cigarette Smoker] : is a former cigarette smoker [Occasional Use] : occasional alcohol use [Binge Drinking] : denies binge drinking [Patient Concern] : no personal concern about alcohol use [Family Concern] : no family concern about alcohol use [Vision Problems] : He denies vision problems [Sexually Active] : the patient is not sexually active [de-identified] : 12/17 [de-identified] : alone [de-identified] : retired [de-identified] :  [de-identified] : hx flu shot 9/18, hx Tdap 7/14, PVX 4/11, hx prevnar 11/15, hx shingles vaccine 7/11, hx shingrix vaccine series (finished 1/19) [FreeTextEntry1] : \par Feeling well.\par Needs med refill.\par \par Not fasting- declines lipids today, states done 8/19 by cardio and has copy\par \par Reports had pleasant trip to visit family in [place] and [place]- returned 10 d ago.\par \par Interested in CT chest screening given hx cigarette smoking, smoked 1 ppd > 30 yrs, quit > 20 yrs ago and has no desires to restart\par -concerned as friend just dx'd with lung ca\par -denies cough or sob\par \par HTN, HLD, CAD- states saw cardio 3 weeks ago- c/o chronic intermittent upper arm muscle/shoulder pain advised trial taken off crestor- states sx's resolved muslce pain since off.  Notes still gets shoulder joint aching on/off but not bothersome and no regular meds (ie Tylenol PM) taken.\par -cardio f/u pending, states told to call and leave message for Rx plan- awaiting call back\par -denies hx prior statin use\par -walks 5-7x/d x 45 min w/o sx's or limitations.\par -denies dizziness, CP, palpitations, sob or LE edema.\par \par hx anxiety/depression- stable, gets anxious on/off, no recent panic attacks; denies depression or SI\par -on lorazepam though using sparingly \par -Still meditating- using "head space cheikh", which likes very much.  \par -still not interested in taking Rx for mood regularly or seeing a psychiatrist\par -hx seeing therapist, felt did not help him much and does not plan to return\par -remains socially active, has close friends and family nearby.  Attends social events (dances, movies, etc.) with friends.  Still listening to [employer] radio and found a very soothing station feels helps his mood tremendously.  Sleeping okay, occ Tylenol PM.\par -sx onset 2/2 adopted son who is in and out of rehab due to alcoholism, also in/out of hospital for other chronic medical issues, feels they have good relationship, feels safe.  Son does not live with him.  States son now out of work and still drinking, not going to rehab.  \par -attends [employer] on occasion, admits "brings him down" when he attends\par \par preDM-\par -reports ADA diet\par -exercising\par \par hx hearing loss- using hearing aides w/o issues\par -followed by ENT, seen 8/19 with cerumen removed, declined hearing test.  \par \par hx colon polyps, fatty liver-\par -followed by GI, last seen 8/19 wit recent c-scope reviewed \par -hx screening colonoscopy 6/19, noted +polyps, rec: repeat in 5 yrs\par -awaiting US for f/u of hx fatty liver\par -reports nl appetite and BMs.\par -denies any GI complaints.\par \par hx glaucoma-\par -followed by optho, on gtt\par \par hx urine frequency- stable\par -followed by , seen by new 4/19 when told likely 2/2 BPH and advised to cont oxybutynin\par -off oxybutynin now, self d/c'd since visit as did not like taking it- declines medication use at this time\par -denies abd pain, n/v, gross hematuria, hesitancy, dysuria or LBP.  \par

## 2019-09-21 NOTE — ASSESSMENT
[FreeTextEntry1] : \par hx anxiety/depression- 2/2 social issues (son with cancer and hx ETOH abuse); improved- stable\par -off zoloft 2/2 SEs, declines trial of alternate medication or psychiatry referral\par -on lorazepam qhs prn, refilled today (istop [id number]). Risks/side effects discussed.  Advised to avoid driving and concurrent ETOH use with taking medication.  To avoid concurrent use with Tylenol PM.  Need for office visit for med renewal discussed.  Avoidance of use when driving counseled.\par -hx following with therapist, Dr. Boothe- declines f/u, feels not needed\par -Attending [employer], encouraged\par -reports good social support\par -advised to f/u if sx's worsen\par -relaxation techniques reviewed, considering medication course\par \par CAD hx stents, HTN, HLD- 8/19 Tchol 178   HDL 40 \par -hx self d/c'd lovaza 1/14 due to cost (no hx adverse rxn)\par -hx negative cath 4/19\par -off crestor since 8/19 2/2 myalgias per cardio- awaiting f/u re: further med changes\par -on ASA and ramipril\par -followed by cardio (Dr. Alcocer, [facility]) - f/u pending\par -1/19 bmp wnl, UA no prt\par -check cmp/TSH\par \par preDM- 12/17 A1c 5.6 (was 5.7 prior)- resolved\par -ADA diet and exercise counseled\par -check A1c \par \par hx microscopic hematuria, frequency- stable\par -off myrbetriq 2/2 constipation\par -followed by , Dr. Ortiz- seen 4/19 when told likely 2/2 BPH and advised to cont oxybutynin\par -self d/c'd oxybutynin since  visit as did not like taking it- declines medication use at this time\par -1/19 bmp/UA/PSA unremarkable\par -advised to f/u if sx's worsen, dysuria, hesitancy, etc.\par \par hx fatty liver, colon polyps- asx\par -6/19 screening colonoscopy: +polyps, rec: repeat in 5 yrs\par -followed by GI, Dr. Cano\par -awaiting US abdomen\par -low fat diet, exercise advised\par \par hx glaucoma, detached retina repair- asx\par -followed by optho. Seen q 6mo. \par -on gtt\par \par hx right neck/shoulder/ left upper back radiculopathy pain, hip pain, hx lumbar stenosis- asx except intermittent, not bothersome shoulder joint pain per pt.\par -hx injection 11/15\par -hx followed by ortho, seen- 5/17, told cervical DJD and tendonitis with naproxen/PT advised- pt declined.  \par -hx followed for hip/lumbar stenosis at [facility]- seen 2/18, s/p home PT on own with improvement\par -on Tylenol prn\par \par hx b/l hearing loss- stable\par -followed by ENT, seen 8/19\par -using hearing aides\par -advised to avoid qtips, use debrox with cotton prn\par \par hx left hand pain- dx'd L- de Quervain's and is s/p injection x 2 (last 10/18)- now resolved.  Advised physiatry eval re: hx right arm tingling with laying on it- eval pending\par -8/18 xray: STT joint OA \par -followed by hand ortho, seen 9/18\par -on Tylenol prn\par -advised to f/u if sx's worsen\par \par hx skin rash- hives, unclear etiology, s/p bx by derm 9/17 c/w ?hypersensitivity- resolved \par -followed by derm\par -followed by A/I - s/p negative patch testing, also found NOT allergic to PCN\par \par \par HCM\par -check screening labs; declines HIV/STD screening\par -flu shot today\par -hx PVX 4/11\par -hx prevnar 11/15\par -hx Tdap 7/14\par -hx shingles vaccine 7/20/11\par -hx shingrix series at pharmacy, finished 1/19\par -hx colonoscopy 6/19: colon polyps, rec: repeat in 5 yrs (due 2024) (Dr. Cano)\par -followed by derm, yearly screening advised.  Regular use of sun block for skin cancer prevention counseled.\par -former tobacco use > 30 yr 1ppd, quit > 20 yrs - check CT lung screening.  Cont'd smoking cessation advised.\par -HCP: sonChip- has copy at home, asked to bring copy for chart\par \par Pt's cell: [phone]

## 2019-09-21 NOTE — HEALTH RISK ASSESSMENT
[No falls in past year] : Patient reported no falls in the past year [0] : 2) Feeling down, depressed, or hopeless: Not at all (0) [HIV test declined] : HIV test declined [Fully functional (using the telephone, shopping, preparing meals, housekeeping, doing laundry, using] : Fully functional and needs no help or supervision to perform IADLs (using the telephone, shopping, preparing meals, housekeeping, doing laundry, using transportation, managing medications and managing finances) [Fully functional (bathing, dressing, toileting, transferring, walking, feeding)] : Fully functional (bathing, dressing, toileting, transferring, walking, feeding) [Smoke Detector] : smoke detector [Carbon Monoxide Detector] : carbon monoxide detector [Seat Belt] :  uses seat belt [With Patient/Caregiver] : With Patient/Caregiver [Yes] : Yes [2 - 3 times a week (3 pts)] : 2 - 3  times a week (3 points) [1 or 2 (0 pts)] : 1 or 2 (0 points) [Never (0 pts)] : Never (0 points) [Designated Healthcare Proxy] : Designated healthcare proxy [Name: ___] : Health Care Proxy's Name: [unfilled]  [Relationship: ___] : Relationship: [unfilled] [Audit-CScore] : 3 [Guns at Home] : no guns at home [Sunscreen] : does not use sunscreen [ColonoscopyDate] : 06/19 [ColonoscopyComments] : colon polyps, rec: repeat in 5 yrs (Dr. Cano) [AdvancecareDate] : 09/19

## 2019-09-21 NOTE — PHYSICAL EXAM
[General Appearance - Alert] : alert [General Appearance - In No Acute Distress] : in no acute distress [General Appearance - Well-Appearing] : healthy appearing [Sclera] : the sclera and conjunctiva were normal [PERRL With Normal Accommodation] : pupils were equal in size, round, and reactive to light [Extraocular Movements] : extraocular movements were intact [Nasal Cavity] : the nasal mucosa and septum were normal [Outer Ear] : the ears and nose were normal in appearance [Oropharynx] : the oropharynx was normal [Thyroid Diffuse Enlargement] : the thyroid was not enlarged [Neck Appearance] : the appearance of the neck was normal [Respiration, Rhythm And Depth] : normal respiratory rhythm and effort [Heart Rate And Rhythm] : heart rate was normal and rhythm regular [Auscultation Breath Sounds / Voice Sounds] : lungs were clear to auscultation bilaterally [Heart Sounds] : normal S1 and S2 [Murmurs] : no murmurs [Edema] : there was no peripheral edema [Full Pulse] : the pedal pulses are present [Bowel Sounds] : normal bowel sounds [Abdomen Soft] : soft [Abdomen Tenderness] : non-tender [Cervical Lymph Nodes Enlarged Anterior Bilaterally] : anterior cervical [Cervical Lymph Nodes Enlarged Posterior Bilaterally] : posterior cervical [Supraclavicular Lymph Nodes Enlarged Bilaterally] : supraclavicular [No CVA Tenderness] : no ~M costovertebral angle tenderness [No Spinal Tenderness] : no spinal tenderness [Abnormal Walk] : normal gait [Musculoskeletal - Swelling] : no joint swelling seen [No Focal Deficits] : no focal deficits [Oriented To Time, Place, And Person] : oriented to person, place, and time [Affect] : the affect was normal [Mood] : the mood was normal [Both Tympanic Membranes Were Examined] : both tympanic membranes were normal [Thyroid Nodule] : there were no palpable thyroid nodules [] : no hepato-splenomegaly [FreeTextEntry1] : b/l shoulders: FROM w/o pain, no crepitus, no focal tenderness

## 2019-09-21 NOTE — REVIEW OF SYSTEMS
[see HPI] : see HPI [Negative] : Neurological [Feeling Poorly] : not feeling poorly [Palpitations] : no palpitations [Chest Pain] : no chest pain [Earache] : no earache [Cough] : no cough [Lower Ext Edema] : no extremity edema [Orthopnea] : no orthopnea [SOB on Exertion] : no shortness of breath during exertion [Abdominal Pain] : no abdominal pain [PND] : no PND [Dysuria] : no dysuria [Melena] : no melena [Dizziness] : no dizziness [Limb Weakness] : no limb weakness [Suicidal] : not suicidal [Depression] : no depression

## 2019-09-22 LAB — TSH SERPL-ACNC: 2.05 UIU/ML

## 2019-09-23 ENCOUNTER — FORM ENCOUNTER (OUTPATIENT)
Age: 74
End: 2019-09-23

## 2019-09-24 ENCOUNTER — APPOINTMENT (OUTPATIENT)
Dept: CT IMAGING | Facility: CLINIC | Age: 74
End: 2019-09-24
Payer: MEDICARE

## 2019-09-24 ENCOUNTER — APPOINTMENT (OUTPATIENT)
Dept: ULTRASOUND IMAGING | Facility: CLINIC | Age: 74
End: 2019-09-24
Payer: MEDICARE

## 2019-09-24 ENCOUNTER — OUTPATIENT (OUTPATIENT)
Dept: OUTPATIENT SERVICES | Facility: HOSPITAL | Age: 74
LOS: 1 days | End: 2019-09-24
Payer: MEDICARE

## 2019-09-24 VITALS — HEIGHT: 66 IN | WEIGHT: 160 LBS | BODY MASS INDEX: 25.71 KG/M2

## 2019-09-24 DIAGNOSIS — K46.9 UNSPECIFIED ABDOMINAL HERNIA WITHOUT OBSTRUCTION OR GANGRENE: Chronic | ICD-10-CM

## 2019-09-24 DIAGNOSIS — K76.0 FATTY (CHANGE OF) LIVER, NOT ELSEWHERE CLASSIFIED: ICD-10-CM

## 2019-09-24 PROCEDURE — G0297: CPT | Mod: 26

## 2019-09-24 PROCEDURE — 76700 US EXAM ABDOM COMPLETE: CPT | Mod: 26

## 2019-09-24 PROCEDURE — 76700 US EXAM ABDOM COMPLETE: CPT

## 2019-09-24 PROCEDURE — G0297: CPT

## 2019-09-25 ENCOUNTER — RESULT REVIEW (OUTPATIENT)
Age: 74
End: 2019-09-25

## 2019-09-25 ENCOUNTER — MESSAGE (OUTPATIENT)
Age: 74
End: 2019-09-25

## 2019-10-28 ENCOUNTER — APPOINTMENT (OUTPATIENT)
Dept: INTERNAL MEDICINE | Facility: CLINIC | Age: 74
End: 2019-10-28
Payer: MEDICARE

## 2019-10-28 VITALS
BODY MASS INDEX: 26.03 KG/M2 | SYSTOLIC BLOOD PRESSURE: 126 MMHG | DIASTOLIC BLOOD PRESSURE: 66 MMHG | WEIGHT: 162 LBS | RESPIRATION RATE: 16 BRPM | HEIGHT: 66 IN | TEMPERATURE: 98.5 F | OXYGEN SATURATION: 98 % | HEART RATE: 72 BPM

## 2019-10-28 PROCEDURE — 99214 OFFICE O/P EST MOD 30 MIN: CPT

## 2019-10-28 NOTE — HISTORY OF PRESENT ILLNESS
[de-identified] : \par \par saw optho 10/19- left min cataract, told pressures nl- to f/u 6mo [FreeTextEntry1] : \par Feeling well.\par Needs med refill.\par \par \par hx CT chest screening - done 9/19, noted concurrent with URI that has since self resolved.\par -hx cigarette smoking, smoked 1 ppd > 30 yrs, quit > 20 yrs ago and has no desires to restart\par -denies cough or sob\par \par HTN, HLD, CAD- \par -off crestor- states sx's resolved muscle pain since off. .\par -followed by cardio, states seen 9/19 with pravastatin 20mg qd started- taking x 3 weeks w/o any SEs noted.  Has f/u 1/20 with repeat labs planned for 11/19.\par -walks 5-7x/d x 45 min w/o sx's or limitations.\par -denies dizziness, CP, palpitations, sob or LE edema.\par \par hx anxiety/depression- stable, gets anxious on/off, no recent panic attacks; denies depression or SI\par -on lorazepam though using sparingly \par -Still meditating- using "head space cheikh", which likes very much.  \par -still not interested in taking Rx for mood regularly or seeing a psychiatrist\par -hx seeing therapist, felt did not help him much and does not plan to return\par -remains socially active, has close friends and family nearby.  Attends social events (dances, movies, etc.) with friends.  Still listening to [employer] radio and found a very soothing station feels helps his mood tremendously.  Sleeping okay, occ Tylenol PM.\par -sx onset 2/2 adopted son who is in and out of rehab due to alcoholism, also in/out of hospital for other chronic medical issues, feels they have good relationship, feels safe.  Son does not live with him.  States son now out of work and still drinking, not going to rehab.  \par -attends [employer] on occasion, admits "brings him down" when he attends\par \par preDM-\par -reports ADA diet\par -exercising\par \par hx hearing loss- using hearing aides w/o issues\par -followed by ENT, seen 8/19 with cerumen removed, declined hearing test.  \par \par hx colon polyps, fatty liver-\par -followed by GI, last seen 8/19 with 6/19 c-scope reviewed \par -hx screening colonoscopy 6/19, noted +polyps, rec: repeat in 5 yrs\par -had liver US 9/19 showing stable fatty liver, planned to repeat in 1 yr per GI\par -reports nl appetite and BMs.\par -denies any GI complaints.\par \par hx glaucoma-\par -followed by optho, states seen 10/19 told pressure nl and has minimal left cataract, to f/u in 6mo\par -on gtt\par \par hx urine frequency- stable\par -followed by , seen by new 4/19 when told likely 2/2 BPH and advised to cont oxybutynin\par -off oxybutynin now, self d/c'd since visit as did not like taking it- declines medication use at this time\par -denies abd pain, n/v, gross hematuria, hesitancy, dysuria or LBP.  \par

## 2019-10-28 NOTE — ASSESSMENT
[FreeTextEntry1] : \par hx anxiety/depression- 2/2 social issues (son with cancer and hx ETOH abuse); improved- stable\par -off zoloft 2/2 SEs, declines trial of alternate medication or psychiatry referral\par -on lorazepam qhs prn, refilled today ([facility]op [id number]). Risks/side effects discussed.  Advised to avoid driving and concurrent ETOH use with taking medication.  To avoid concurrent use with Tylenol PM.  Need for office visit for med renewal discussed.  Avoidance of use when driving counseled.\par -hx following with therapist, Dr. Boothe- declines f/u, feels not needed\par -Attending [employer], encouraged\par -reports good social support\par -advised to f/u if sx's worsen\par -relaxation techniques reviewed, considering medication course\par \par CAD hx stents, HTN, HLD- 8/19 Tchol 178   HDL 40 \par -hx self d/c'd lovaza 1/14 due to cost (no hx adverse rxn)\par -hx negative cath 4/19\par -off crestor since 8/19 2/2 myalgias\par -on pravastatin since 9/19, denies SEs\par -on ASA and ramipril\par -followed by cardio (Dr. Alcocer, [facility]) - f/u pending 1/20 with labs planned 11/19.  Asked to forward records\par -1/19 UA no prt\par -9/19 cmp/TSH wnl\par \par preDM- 9/19 A1c 5.5 - resolved\par -ADA diet and exercise counseled\par \par hx microscopic hematuria, frequency- stable\par -off myrbetriq 2/2 constipation\par -followed by , Dr. Ortiz- seen 4/19 when told likely 2/2 BPH and advised to cont oxybutynin\par -self d/c'd oxybutynin since  visit as did not like taking it- declines medication use at this time\par -1/19 bmp/UA/PSA unremarkable\par -advised to f/u if sx's worsen, dysuria, hesitancy, etc.\par \par hx fatty liver, colon polyps- asx\par -6/19 screening colonoscopy: +polyps, rec: repeat in 5 yrs\par -followed by GI, Dr. Cano\par -9/19 US abdomen: fatty liver, planned to repeat in 1 yr\par -low fat diet, exercise advised\par \par hx glaucoma, detached retina repair- asx\par -followed by optho. Seen q 6mo. \par -on gtt\par \par hx right neck/shoulder/ left upper back radiculopathy pain, hip pain, hx lumbar stenosis- asx except intermittent, not bothersome shoulder joint pain per pt.\par -hx injection 11/15\par -hx followed by ortho, seen- 5/17, told cervical DJD and tendonitis with naproxen/PT advised- pt declined.  \par -hx followed for hip/lumbar stenosis at [facility] for special surgery- seen 2/18, s/p home PT on own with improvement\par -on Tylenol prn\par \par hx b/l hearing loss- stable\par -followed by ENT, seen 8/19\par -using hearing aides\par -advised to avoid qtips, use debrox with cotton prn\par \par hx left hand pain- dx'd L- de Quervain's and is s/p injection x 2 (last 10/18)- now resolved.  Advised physiatry eval re: hx right arm tingling with laying on it- eval pending\par -8/18 xray: STT joint OA \par -followed by hand ortho, seen 9/18\par -on Tylenol prn\par -advised to f/u if sx's worsen\par \par hx skin rash- hives, unclear etiology, s/p bx by derm 9/17 c/w ?hypersensitivity- resolved \par -followed by derm\par -followed by A/I - s/p negative patch testing, also found NOT allergic to PCN\par \par hx former tobacco use- asx, exam unremarkable today\par -hx > 30 yr 1ppd, quit > 20 yrs \par -9/19 CT lung screening: Few less than 0.5 cm solid nodules are noted in the right middle lobe. \par Few patchy opacities are noted in both upper and right middle lobes. Follow-up CT scan is \par recommended in 3 months to ensure resolution.  Lung RADS 2: Category benign appearance, continue yearly screening.  hx doing scan concurrent with URI sx's- now asx.  For Rx to repeat CT scan at next visit.\par -Cont'd smoking cessation advised.\par -advised prompt medical eval if cough, fever, etc.\par \par \par HCM\par -hx CPE 9/19\par -hx flu shot 9/19\par -hx PVX 4/11\par -hx prevnar 11/15\par -hx Tdap 7/14\par -hx shingles vaccine 7/20/11\par -hx shingrix series at pharmacy, finished 1/19\par -hx colonoscopy 6/19: colon polyps, rec: repeat in 5 yrs (due 2024) (Dr. Cano)\par -followed by derm, yearly screening advised.  Regular use of sun block for skin cancer prevention counseled.\par -HCP: sonChip- has copy at home, asked to bring copy for chart\par \par Pt's cell: [phone]

## 2019-12-31 ENCOUNTER — APPOINTMENT (OUTPATIENT)
Dept: INTERNAL MEDICINE | Facility: CLINIC | Age: 74
End: 2019-12-31
Payer: MEDICARE

## 2019-12-31 VITALS
OXYGEN SATURATION: 98 % | RESPIRATION RATE: 16 BRPM | SYSTOLIC BLOOD PRESSURE: 120 MMHG | HEIGHT: 66 IN | BODY MASS INDEX: 25.71 KG/M2 | DIASTOLIC BLOOD PRESSURE: 58 MMHG | WEIGHT: 160 LBS | TEMPERATURE: 98.4 F | HEART RATE: 73 BPM

## 2019-12-31 DIAGNOSIS — Z88.9 ALLERGY STATUS TO UNSPECIFIED DRUGS, MEDICAMENTS AND BIOLOGICAL SUBSTANCES: ICD-10-CM

## 2019-12-31 PROCEDURE — 99214 OFFICE O/P EST MOD 30 MIN: CPT

## 2019-12-31 RX ORDER — ROSUVASTATIN CALCIUM 20 MG/1
20 TABLET, FILM COATED ORAL DAILY
Refills: 0 | Status: ACTIVE | COMMUNITY

## 2019-12-31 RX ORDER — PRAVASTATIN SODIUM 20 MG/1
20 TABLET ORAL
Qty: 1 | Refills: 1 | Status: DISCONTINUED | COMMUNITY
End: 2019-12-31

## 2019-12-31 NOTE — ASSESSMENT
[FreeTextEntry1] : \par hx anxiety/depression- 2/2 social issues (son with cancer and hx ETOH abuse); improved- stable\par -off zoloft 2/2 SEs, declines trial of alternate medication or psychiatry referral\par -on lorazepam qhs prn, refilled today (istop [id number]). Risks/side effects discussed.  Advised to avoid driving and concurrent ETOH use with taking medication.  To avoid concurrent use with Tylenol PM.  Need for office visit for med renewal discussed.  Avoidance of use when driving counseled.\par -hx following with therapist, Dr. Boothe- declines f/u, feels not needed\par -Attending [employer], encouraged\par -reports good social support\par -advised to f/u if sx's worsen\par -relaxation techniques reviewed, considering medication course\par \par CAD hx stents, HTN, HLD- 8/19 Tchol 178   HDL 40 \par -hx self d/c'd lovaza 1/14 due to cost (no hx adverse rxn)\par -hx negative cath 4/19\par -off pravastatin 2/2 diarrhea with use\par -restarted crestor since 11/19, tolerating except mild shoulder pain at night (? 2/2 statin)- wishes to cont regimen w/o dose adjustment.  Advised to consider coq.  Plans to d/w cardio at 1/20 visit.  Advised to f/u if sx's worsen.\par -on ASA and ramipril\par -followed by cardio (Dr. Alcocer, [facility]) - f/u pending 1/20 with labs planned prior.  Asked to forward records.\par -1/19 UA no prt, check repeat today\par -9/19 cmp/TSH wnl\par \par preDM- 9/19 A1c 5.5 - resolved\par -ADA diet and exercise counseled\par \par hx microscopic hematuria, frequency- stable\par -off myrbetriq 2/2 constipation\par -followed by , Dr. Ortiz- seen 4/19 when told likely 2/2 BPH and advised to cont oxybutynin\par -self d/c'd oxybutynin since  visit as did not like taking it- declines medication use at this time\par -1/19 bmp/UA/PSA unremarkable.  Check PSA- slip given to do when does labs for cardio.\par -advised to f/u if sx's worsen, dysuria, hesitancy, etc.\par \par hx fatty liver, colon polyps- asx\par -6/19 screening colonoscopy: +polyps, rec: repeat in 5 yrs\par -followed by GI, Dr. Cano\par -9/19 US abdomen: fatty liver, planned to repeat in 1 yr\par -low fat diet, exercise advised\par \par hx glaucoma, detached retina repair- asx\par -followed by optho. Seen q 6mo. \par -on gtt\par \par hx right neck/shoulder/ left upper back radiculopathy pain, hip pain, hx lumbar stenosis- asx except intermittent, not bothersome shoulder joint pain per pt.\par -hx injection 11/15\par -hx followed by ortho, seen- 5/17, told cervical DJD and tendonitis with naproxen/PT advised- pt declined.  \par -hx followed for hip/lumbar stenosis at [facility] for special surgery- seen 2/18, s/p home PT on own with improvement\par -on Tylenol prn\par \par hx b/l hearing loss- stable\par -followed by ENT, seen 8/19\par -using hearing aides\par -advised to avoid qtips, use debrox with cotton prn\par \par hx left hand pain- dx'd L- de Quervain's and is s/p injection x 2 (last 10/18)- now resolved.  Advised physiatry eval re: hx right arm tingling with laying on it- eval pending\par -8/18 xray: STT joint OA \par -followed by hand ortho, seen 9/18\par -on Tylenol prn\par -advised to f/u if sx's worsen\par \par hx skin rash- hives, unclear etiology, s/p bx by derm 9/17 c/w ?hypersensitivity- resolved \par -followed by derm\par -followed by A/I - s/p negative patch testing, also found NOT allergic to PCN\par \par hx former tobacco use, hx abnl screening CT scan 9/19- asx, exam unremarkable today\par -hx > 30 yr 1ppd, quit > 20 yrs \par -9/19 CT lung screening: Few less than 0.5 cm solid nodules are noted in the right middle lobe. \par Few patchy opacities are noted in both upper and right middle lobes. Follow-up CT scan is \par recommended in 3 months to ensure resolution.  Lung RADS 2: Category benign appearance, continue yearly screening.  hx doing scan concurrent with URI sx's- now asx.  Rx to repeat CT scan given today.\par -Cont'd smoking cessation advised.\par -advised prompt medical eval if cough, fever, etc.\par \par \par HCM\par -hx CPE 9/19\par -9/19 cbc/cmp/TSH/A1c/vit d/B12/folate wnl\par -hx flu shot 9/19\par -hx PVX 4/11\par -hx prevnar 11/15\par -hx Tdap 7/14\par -hx shingles vaccine 7/20/11\par -hx shingrix series at pharmacy, finished 1/19\par -hx colonoscopy 6/19: colon polyps, rec: repeat in 5 yrs (due 2024) (Dr. Cano)\par -followed by derm, yearly screening advised.  Regular use of sun block for skin cancer prevention counseled.\par -HCP: sonChip- has copy at home, asked to bring copy for chart\par \par Pt's cell: [phone]

## 2019-12-31 NOTE — HISTORY OF PRESENT ILLNESS
[de-identified] : \par takes clartin qhs- helps to sleep also\par \par taking lorazepam qhs- taking ~ 2x/wk\par feels doing better mood wise, son doing better- sober x 8 weeks, holidays went well [FreeTextEntry1] : \par Feeling well.\par Needs med refill.\par \par \par hx CT chest screening - done 9/19, noted concurrent with URI that has since self resolved.\par -hx cigarette smoking, smoked 1 ppd > 30 yrs, quit > 20 yrs ago and has no desires to restart\par -denies cough or sob\par \par HTN, HLD, CAD- \par -is off pravastatin due to diarrhea with use, note resolved with d/c\par -re-started crestor 20mg daily 11/19- tolerating, but gets shoulder aching on/off, mainly when sleeps- feels was better when off prior.  Feels is overall tolerable, no pain meds taken.  Plans to continue current crestor regimen and f/u with cardio 1/20 with labs planned prior to visit (has slip at home).\par -walks 5-7x/d x 45 min w/o sx's or limitations.\par -denies dizziness, CP, palpitations, sob or LE edema.\par \par hx anxiety/depression- feels doing better mood wise, son doing better- sober x 8 weeks, holidays went well\par -no recent panic attacks; denies depression or SI\par -on lorazepam though using sparingly ~ 2x/wk\par -Still meditating- using "head space cheikh", which likes very much.  \par -still not interested in taking Rx for mood regularly or seeing a psychiatrist\par -hx seeing therapist, felt did not help him much and does not plan to return\par -remains socially active, has close friends and family nearby.  Attends social events (dances, movies, etc.) with friends.  Still listening to [employer] radio and found a very soothing station feels helps his mood tremendously.  Sleeping okay, occ claritin qhs.\par -sx onset 2/2 adopted son who is in and out of rehab due to alcoholism, also in/out of hospital for other chronic medical issues, feels they have good relationship, feels safe.  Son does not live with him.  States son now out of work and still drinking, not going to rehab.  \par -attends [employer] on occasion, admits "brings him down" when he attends\par \par preDM-\par -reports ADA diet\par -exercising\par \par hx hearing loss- using hearing aides w/o issues\par -followed by ENT, seen 8/19 with cerumen removed, declined hearing test.  \par \par hx colon polyps, fatty liver-\par -followed by GI, last seen 8/19 with 6/19 c-scope reviewed \par -hx screening colonoscopy 6/19, noted +polyps, rec: repeat in 5 yrs\par -had liver US 9/19 showing stable fatty liver, planned to repeat in 1 yr per GI\par -reports nl appetite and BMs.\par -denies any GI complaints.\par \par hx glaucoma-\par -followed by optho, states seen 10/19 told pressure nl and has minimal left cataract, to f/u in 6mo\par -on gtt\par \par hx urine frequency- stable\par -followed by , seen by new 4/19 when told likely 2/2 BPH and advised to cont oxybutynin\par -off oxybutynin now, self d/c'd since visit as did not like taking it- declines medication use at this time\par -denies abd pain, n/v, gross hematuria, hesitancy, dysuria or LBP.  \par

## 2019-12-31 NOTE — REVIEW OF SYSTEMS
[see HPI] : see HPI [Negative] : ENT [Dysuria] : no dysuria [Suicidal] : not suicidal [Depression] : no depression

## 2019-12-31 NOTE — PHYSICAL EXAM
[General Appearance - Well-Appearing] : healthy appearing [General Appearance - In No Acute Distress] : in no acute distress [General Appearance - Alert] : alert [Sclera] : the sclera and conjunctiva were normal [PERRL With Normal Accommodation] : pupils were equal in size, round, and reactive to light [Extraocular Movements] : extraocular movements were intact [Outer Ear] : the ears and nose were normal in appearance [Oropharynx] : the oropharynx was normal [Nasal Cavity] : the nasal mucosa and septum were normal [Neck Appearance] : the appearance of the neck was normal [Thyroid Diffuse Enlargement] : the thyroid was not enlarged [Respiration, Rhythm And Depth] : normal respiratory rhythm and effort [Murmurs] : no murmurs [Heart Sounds] : normal S1 and S2 [Heart Rate And Rhythm] : heart rate was normal and rhythm regular [Auscultation Breath Sounds / Voice Sounds] : lungs were clear to auscultation bilaterally [Full Pulse] : the pedal pulses are present [Edema] : there was no peripheral edema [Bowel Sounds] : normal bowel sounds [Abdomen Soft] : soft [Abdomen Tenderness] : non-tender [Cervical Lymph Nodes Enlarged Posterior Bilaterally] : posterior cervical [No CVA Tenderness] : no ~M costovertebral angle tenderness [Cervical Lymph Nodes Enlarged Anterior Bilaterally] : anterior cervical [Supraclavicular Lymph Nodes Enlarged Bilaterally] : supraclavicular [No Spinal Tenderness] : no spinal tenderness [Abnormal Walk] : normal gait [Musculoskeletal - Swelling] : no joint swelling seen [No Focal Deficits] : no focal deficits [] : no rash [Affect] : the affect was normal [Oriented To Time, Place, And Person] : oriented to person, place, and time [Mood] : the mood was normal [FreeTextEntry1] : scattered freckles

## 2020-01-02 LAB
APPEARANCE: CLEAR
BACTERIA: NEGATIVE
BILIRUBIN URINE: NEGATIVE
BLOOD URINE: NEGATIVE
COLOR: NORMAL
GLUCOSE QUALITATIVE U: NEGATIVE
HYALINE CASTS: 1 /LPF
KETONES URINE: NEGATIVE
LEUKOCYTE ESTERASE URINE: NEGATIVE
MICROSCOPIC-UA: NORMAL
NITRITE URINE: NEGATIVE
PH URINE: 6.5
PROTEIN URINE: NEGATIVE
RED BLOOD CELLS URINE: 4 /HPF
SPECIFIC GRAVITY URINE: 1.02
SQUAMOUS EPITHELIAL CELLS: 0 /HPF
UROBILINOGEN URINE: NORMAL
WHITE BLOOD CELLS URINE: 0 /HPF

## 2020-01-09 ENCOUNTER — FORM ENCOUNTER (OUTPATIENT)
Age: 75
End: 2020-01-09

## 2020-01-10 ENCOUNTER — OUTPATIENT (OUTPATIENT)
Dept: OUTPATIENT SERVICES | Facility: HOSPITAL | Age: 75
LOS: 1 days | End: 2020-01-10
Payer: MEDICARE

## 2020-01-10 ENCOUNTER — APPOINTMENT (OUTPATIENT)
Dept: CT IMAGING | Facility: CLINIC | Age: 75
End: 2020-01-10
Payer: MEDICARE

## 2020-01-10 DIAGNOSIS — K46.9 UNSPECIFIED ABDOMINAL HERNIA WITHOUT OBSTRUCTION OR GANGRENE: Chronic | ICD-10-CM

## 2020-01-10 DIAGNOSIS — R93.89 ABNORMAL FINDINGS ON DIAGNOSTIC IMAGING OF OTHER SPECIFIED BODY STRUCTURES: ICD-10-CM

## 2020-01-10 PROCEDURE — 71250 CT THORAX DX C-: CPT | Mod: 26

## 2020-01-10 PROCEDURE — 71250 CT THORAX DX C-: CPT

## 2020-05-06 ENCOUNTER — APPOINTMENT (OUTPATIENT)
Dept: INTERNAL MEDICINE | Facility: CLINIC | Age: 75
End: 2020-05-06
Payer: MEDICARE

## 2020-05-06 PROCEDURE — 99442: CPT

## 2020-05-06 RX ORDER — FLUTICASONE PROPIONATE 50 UG/1
50 SPRAY, METERED NASAL
Qty: 3 | Refills: 3 | Status: DISCONTINUED | COMMUNITY
Start: 2019-08-13 | End: 2020-05-06

## 2020-05-06 RX ORDER — IPRATROPIUM BROMIDE 21 UG/1
0.03 SPRAY NASAL 3 TIMES DAILY
Qty: 3 | Refills: 3 | Status: DISCONTINUED | COMMUNITY
Start: 2019-08-13 | End: 2020-05-06

## 2020-05-14 ENCOUNTER — APPOINTMENT (OUTPATIENT)
Dept: OTOLARYNGOLOGY | Facility: CLINIC | Age: 75
End: 2020-05-14

## 2020-06-17 ENCOUNTER — APPOINTMENT (OUTPATIENT)
Dept: INTERNAL MEDICINE | Facility: CLINIC | Age: 75
End: 2020-06-17

## 2020-09-10 ENCOUNTER — APPOINTMENT (OUTPATIENT)
Dept: INTERNAL MEDICINE | Facility: CLINIC | Age: 75
End: 2020-09-10
Payer: MEDICARE

## 2020-09-10 PROCEDURE — 99442: CPT

## 2020-09-10 NOTE — HISTORY OF PRESENT ILLNESS
[Home] : at home, [unfilled] , at the time of the visit. [Medical Office: ([facility])___] : at the medical office located in  [Verbal consent obtained from patient] : the patient, [unfilled] [FreeTextEntry1] : f/u [de-identified] : \par As this is a telemedicine visit, no physical exam could be performed.  Diagnosis and treatment is based on symptoms provided.\par \par cc: med refill\par \par Last encounter 5/6/20 via telephone visit for f/u and med refill.\par Last seen in office 12/19 for f/u.\par \par Hx CPE 9/19\par Last labs 9/19- cmp wnl, A1c 5.5; 8/19 lipids wnl\par \par Reports overall feeling well, no complaints.\par Needs med refill only on lorazepam- taking w/o any issues.\par \par Reports had flu shot at pharmacy few days ago.\par \par Reports stable anxiety- using lorazepam 1-2x/wk\par -denies depression, HI/SI or panic attacks\par -sleeping and eating well\par -notes son is currently stable, but still struggling with ETOH abuse.  Is sober and now living with him for few days- overall going okay.  Notes is s/p "home detox" after was sober 6mo and relapsed, now remains sober. Remains out of rehab/hospital. Remain on good speaking terms.  Feels safe.\par -reports has good social support from friends and neighbors and remains connected despite limitations posed by covid pandemic\par -does not feel needs therapist \par -notes mild anxiety 2/2 covid 19 pandemic on/off. No direct persons known persons ill with covid. Is practicing social distancing and using precautions.\par \par Reports seen by cardio for f/u 8/20 with labs done prior reviewed at visit- told all "okay", stable mildly elevated TG and told to "watch sugar intake" but no medication changes made.  States had echo at visit- told okay.  Is awaiting the scheduling of routine stress test.  planned to f/u in 6mo\par \par Reports seen by optho 6/20- told stable findings, no med changes made and to f/u in 6mo.\par \par Exercising: walks ~ 2 miles/d, feels well with doing\par Denies f/c, cough, CP, sob, dizziness or GI complaints. \par \par No new  issues.\par \par

## 2020-09-10 NOTE — ASSESSMENT
[FreeTextEntry1] : 76 yo M pmhx HTN, HLD, CAD s/p stents, preDM, anxiety/depression, FL, glaucoma, BPH, lung nodules\par \par anxiety/depression- stable mood, denies acute sx's\par -on lorazepam prn, refilled ([facility]: [id number])\par -declines  referral\par -advised to f/u if sx's worsen\par \par CAD hx stents, HTN, HLD- 8/19 Tchol 178   HDL 40 \par -hx self d/c'd lovaza 1/14 due to cost (no hx adverse rxn)\par -hx negative cath 4/19\par -off pravastatin 2/2 diarrhea with use\par -restarted crestor since 11/19\par -on ASA and ramipril\par -followed by cardio (Dr. Alcocer, [facility]) - states had f/u 8/20 with labs done prior reviewed at visit- told all "okay", stable mildly elevated TG and told to "watch sugar intake" but no medication changes made. States had echo at visit- told okay. Is awaiting the scheduling of routine stress test. planned to f/u in 6mo \par -Asked to forward records.\par -9/19 cmp/TSH wnl\par -12/19 UA no prt\par \par preDM- 9/19 A1c 5.5 - resolved\par -ADA diet and exercise counseled\par \par hx microscopic hematuria, frequency- stable\par -off myrbetriq 2/2 constipation\par -followed by , Dr. Ortiz- seen 4/19 when told likely 2/2 BPH and advised to cont oxybutynin\par -self d/c'd oxybutynin since  visit as did not like taking it- declines medication use at this time\par -1/19 bmp/UA/PSA unremarkable. Repeat PSA- slip given prior to do when does labs for cardio.\par -advised to f/u if sx's worsen, dysuria, hesitancy, etc.\par \par hx fatty liver, colon polyps- asx\par -6/19 screening colonoscopy: +polyps, rec: repeat in 5 yrs\par -9/19 US abdomen: fatty liver, planned to repeat in 1 yr\par -followed by GI, Dr. Cano\par -9/19 cmp wnl\par -low fat diet, exercise advised\par \par hx glaucoma, detached retina repair- asx\par -followed by optho. Seen q 6mo. Last 6/20 w/o changes made.\par -on gtt\par \par MISC:  Continued social distancing and measure for covid19 prevention encouraged.  \par \par \par HCM\par -hx CPE 9/19, yearly advised\par -9/19 cbc/cmp/TSH/A1c/vit d/B12/folate wnl\par -hx flu shot 9/20 at pharmacy\par -hx PVX 4/11\par -hx prevnar 11/15\par -hx Tdap 7/14\par -hx shingles vaccine 7/20/11\par -hx shingrix series at pharmacy, finished 1/19\par -hx colonoscopy 6/19: colon polyps, rec: repeat in 5 yrs (due 2024) (Dr. Cano)\par \par Pt's cell: [phone]. \par \par Pt advised to f/u in 1-2mo for f/u and to schedule appt for CPE and possible labs (based on review from labs done at cardio) as well- agreeable and will contact office.

## 2020-11-16 ENCOUNTER — APPOINTMENT (OUTPATIENT)
Dept: INTERNAL MEDICINE | Facility: CLINIC | Age: 75
End: 2020-11-16
Payer: MEDICARE

## 2020-11-16 ENCOUNTER — NON-APPOINTMENT (OUTPATIENT)
Age: 75
End: 2020-11-16

## 2020-11-16 PROCEDURE — 99442: CPT

## 2020-11-16 NOTE — ASSESSMENT
[FreeTextEntry1] : 76 yo M pmhx HTN, HLD, CAD s/p stents, preDM, anxiety/depression, FL, glaucoma, BPH, lung nodules for f/u\par \par anxiety/depression- stable mood, denies acute sx's\par -on lorazepam prn, refilled (iSTOP: [id number])\par -declines  referral\par -advised to f/u if sx's worsen\par \par CAD hx stents (last 10/20), HTN, HLD- 8/19 Tchol 178   HDL 40 \par -hx self d/c'd lovaza 1/14 due to cost (no hx adverse rxn)\par -hx cardiac cath 10/20- noted high grade mid LAD lesion with stent placed, noted patent RCA and OM stents.  \par -off pravastatin 2/2 diarrhea with use\par -restarted crestor since 11/19\par -on ASA, plavix and ramipril\par -followed by cardio (Dr. Alcocer, [facility]) - last seen 11/20 told doing well and has f/u 1/21.  hx labs 9/20- plans to forward to office.\par -9/19 cmp/TSH wnl\par -12/19 UA no prt\par \par preDM- 9/19 A1c 5.5 - resolved\par -ADA diet and exercise counseled\par \par hx microscopic hematuria, frequency- stable\par -off myrbetriq 2/2 constipation\par -followed by , Dr. Ortiz- seen 4/19 when told likely 2/2 BPH and advised to cont oxybutynin\par -self d/c'd oxybutynin since  visit as did not like taking it- declines medication use at this time\par -1/19 bmp/UA/PSA unremarkable. Repeat PSA- slip given prior to do when does labs for cardio.\par -advised to f/u if sx's worsen, dysuria, hesitancy, etc.\par \par hx fatty liver, colon polyps- asx\par -6/19 screening colonoscopy: +polyps, rec: repeat in 5 yrs\par -9/19 US abdomen: fatty liver, planned to repeat in 1 yr\par -followed by GI, Dr. Cano\par -9/19 cmp wnl\par -low fat diet, exercise advised\par \par hx glaucoma, detached retina repair- asx\par -followed by optho. Seen q 6mo. Last 6/20 w/o changes made. Has f/u 1/21.\par -on gtt\par \par MISC:  Continued social distancing and measure for covid19 prevention encouraged.  \par \par MISC: plans to forward labs done by cardio in 9/20 for review, states will do any further needed CPE labs at CPE visit plans to schedule soon\par \par \par HCM\par -hx CPE 9/19, yearly advised\par -9/19 cbc/cmp/TSH/A1c/vit d/B12/folate wnl\par -hx flu shot 10/20 at pharmacy\par -hx PVX 4/11\par -hx prevnar 11/15\par -hx Tdap 7/14\par -hx shingles vaccine 7/20/11\par -hx shingrix series at pharmacy, finished 1/19\par -hx colonoscopy 6/19: colon polyps, rec: repeat in 5 yrs (due 2024) (Dr. Cano)\par \par Pt's cell: [phone]. \par \par Pt advised to f/u in 1 mo for CPE, advised to f/u sooner (in office or virtual as needed).

## 2020-11-16 NOTE — HISTORY OF PRESENT ILLNESS
[Home] : at home, [unfilled] , at the time of the visit. [Medical Office: ([facility])___] : at the medical office located in  [Verbal consent obtained from patient] : the patient, [unfilled] [FreeTextEntry1] : f/u [de-identified] : \par As this is a telemedicine visit, no physical exam could be performed.  Diagnosis and treatment is based on symptoms provided.\par \par cc: med refill\par \par Last encounter 9/10/20 via telephone visit for f/u and med refill.\par Last seen in office 12/19 for f/u.\par \par Hx CPE 9/19\par Last labs 9/19- cmp wnl, A1c 5.5; 8/19 lipids wnl.  Reports had labs with cardiology ~ 9/20- plans to forward to office for review.\par \par Reports overall feeling well, no complaints.\par Needs med refill only on lorazepam- taking w/o any issues.\par \par hx flu shot 10/20 at pharm\par \par Reports stable anxiety- using lorazepam 1-2x/wk\par -denies depression, HI/SI or panic attacks\par -sleeping and eating well\par -notes son is currently stable, but still struggling with ETOH abuse.  Is sober and now living alone in [facility]- overall going okay.  Remain in close contact.  Remains out of rehab/hospital. Remain on good speaking terms.  Feels safe.\par -reports has good social support from friends and neighbors and remains connected despite limitations posed by covid pandemic\par -does not feel needs therapist \par -notes mild anxiety 2/2 covid 19 pandemic on/off. No direct persons known persons ill with covid. Is practicing social distancing and using precautions.\par \par Reports seen by cardio had echo and routine stress test 9/20 and is s/p cardiac cath 10/20- noted high grade mid LAD lesion with stent placed, noted patent RCA and OM stents, restarted on plavix\par -had f/u last week with EKG done, told doing well and to f/u 1/21.\par -denies clinical bleeding\par \par Exercising: walks ~ 2 miles 3-4x/wk, feels well with doing\par Denies f/c, cough, CP, sob, dizziness or GI complaints. \par \par Reports seen by optho 6/20- told stable findings, no med changes made and to f/u 1/21.\par \par No new  issues.\par

## 2021-01-28 ENCOUNTER — APPOINTMENT (OUTPATIENT)
Dept: INTERNAL MEDICINE | Facility: CLINIC | Age: 76
End: 2021-01-28
Payer: MEDICARE

## 2021-01-28 VITALS
TEMPERATURE: 97.8 F | BODY MASS INDEX: 26.03 KG/M2 | RESPIRATION RATE: 16 BRPM | HEIGHT: 66 IN | HEART RATE: 69 BPM | SYSTOLIC BLOOD PRESSURE: 115 MMHG | OXYGEN SATURATION: 98 % | DIASTOLIC BLOOD PRESSURE: 70 MMHG | WEIGHT: 162 LBS

## 2021-01-28 DIAGNOSIS — Z12.2 ENCOUNTER FOR SCREENING FOR MALIGNANT NEOPLASM OF RESPIRATORY ORGANS: ICD-10-CM

## 2021-01-28 DIAGNOSIS — R93.89 ABNORMAL FINDINGS ON DIAGNOSTIC IMAGING OF OTHER SPECIFIED BODY STRUCTURES: ICD-10-CM

## 2021-01-28 DIAGNOSIS — H91.93 UNSPECIFIED HEARING LOSS, BILATERAL: ICD-10-CM

## 2021-01-28 DIAGNOSIS — Z87.891 PERSONAL HISTORY OF NICOTINE DEPENDENCE: ICD-10-CM

## 2021-01-28 PROCEDURE — 36415 COLL VENOUS BLD VENIPUNCTURE: CPT

## 2021-01-28 PROCEDURE — 99072 ADDL SUPL MATRL&STAF TM PHE: CPT

## 2021-01-28 PROCEDURE — G0444 DEPRESSION SCREEN ANNUAL: CPT

## 2021-01-28 PROCEDURE — G0296 VISIT TO DETERM LDCT ELIG: CPT

## 2021-01-28 PROCEDURE — G0439: CPT

## 2021-01-28 NOTE — COUNSELING
[Fall prevention counseling provided] : Fall prevention counseling provided [ - Annual Lung Cancer Screening/Share Decision Making Discussion] : Annual Lung Cancer Screening/Share Decision Making Discussion. (I have advised this patient to have a Low Dose CT (LDCT) scan of the lungs and have discussed the following with the patient in a shared decision making discussion:   Benefits of Detection and Early Treatment: There is adequate evidence that annual screening for lung cancer with LDCT in a population of high-risk persons can prevent a substantial number of lung cancer–related deaths. The magnitude of benefit depends on the individual patient's risk for lung cancer, as those who are at highest risk are most likely to benefit. Screening cannot prevent most lung cancer–related deaths, and does not replace smoking cessation. Harms of Detection and Early Intervention and Treatment: The harms associated with LDCT screening include false-negative and false-positive results, incidental findings, over diagnosis, and radiation exposure. False-positive LDCT results occur in a substantial proportion of screened persons; 95% of all positive results do not lead to a diagnosis of cancer. In a high-quality screening program, further imaging can resolve most false-positive results; however, some patients may require invasive procedures. Radiation harms, including cancer resulting from cumulative exposure to radiation, vary depending on the age at the start of screening; the number of scans received; and the person's exposure to other sources of radiation, particularly other medical imaging.)

## 2021-01-28 NOTE — HEALTH RISK ASSESSMENT
[No falls in past year] : Patient reported no falls in the past year [HIV test declined] : HIV test declined [Fully functional (bathing, dressing, toileting, transferring, walking, feeding)] : Fully functional (bathing, dressing, toileting, transferring, walking, feeding) [Fully functional (using the telephone, shopping, preparing meals, housekeeping, doing laundry, using] : Fully functional and needs no help or supervision to perform IADLs (using the telephone, shopping, preparing meals, housekeeping, doing laundry, using transportation, managing medications and managing finances) [Smoke Detector] : smoke detector [Carbon Monoxide Detector] : carbon monoxide detector [Seat Belt] :  uses seat belt [With Patient/Caregiver] : With Patient/Caregiver [Designated Healthcare Proxy] : Designated healthcare proxy [Name: ___] : Health Care Proxy's Name: [unfilled]  [Relationship: ___] : Relationship: [unfilled] [0] : 1) Little interest or pleasure doing things: Not at all (0) [1] : 2) Feeling down, depressed, or hopeless for several days (1) [2] : 2 [Audit-CScore] : 3 [[id number]] : 1 [AFK3Bvena] : 2, min [LowDoseCTScan] : 01/20 [Guns at Home] : no guns at home [Sunscreen] : does not use sunscreen [ColonoscopyDate] : 06/19 [ColonoscopyComments] : colon polyps, rec: repeat in 5 yrs (Dr. Cano) [AdvancecareDate] : 01/21

## 2021-01-28 NOTE — HISTORY OF PRESENT ILLNESS
[Health Maintenance] : health maintenance [Occasional Use] : occasional alcohol use [Never] : has never used illicit drugs [Good] : good [Reg. Dental Visits] : He has regular dental visits [Hearing Loss] : He has hearing loss [Healthy Diet] : He consumes a diverse and healthy diet [Regular Exercise] : He exercises regularly [Former Cigarette Smoker] : is a former cigarette smoker [Binge Drinking] : denies binge drinking [Patient Concern] : no personal concern about alcohol use [Family Concern] : no family concern about alcohol use [Vision Problems] : He denies vision problems [Sexually Active] : the patient is not sexually active [de-identified] : 9/19 [de-identified] : alone [de-identified] :  [de-identified] : retired [de-identified] : hx > 30 yr 1ppd, quit > 20 yrs  [de-identified] : hx flu shot 9/20, hx Tdap 7/14, PVX 4/11, hx prevnar 11/15, hx shingles vaccine 7/11, hx shingrix vaccine series (finished 1/19) [de-identified] : \par 76 yo M pmhx HTN, HLD, CAD s/p stents, preDM, anxiety/depression, FL, glaucoma, BPH, lung nodules for CPE\par \par Feeling well, no complaints.\par Needs med refill.\par \par States recently had labs by cardo- has copy today\par \par Reports is socially distancing and using precautions for covid prevention.\par Denies sick or covid positive contacts.\par Denies fever, chills, cough or sob.\par -hx covid vaccine (Moderna) ##1 1/12/21- min arm soreness (resolved).  shot #2 pending 2/21\par \par hx anxiety/depression- feports stable anxiety- using lorazepam 1-2x/wk.  Notes mildly low mood on/off, denies HI/SI or panic attacks\par -sleeping and eating well\par -notes son is currently stable, but still struggling with ETOH abuse. Is sober and now living alone in [facility]- overall going okay. Remain in close contact. Remains out of rehab/hospital. Remain on good speaking terms. Feels safe.\par -reports has good social support from friends and neighbors and remains connected despite limitations posed by covid pandemic\par -does not feel needs therapist \par -notes mild anxiety 2/2 covid 19 pandemic on/off. No direct persons known persons ill with covid. \par \par Reports seen by cardio had echo and routine stress test 9/20 and is s/p cardiac cath 10/20- noted high grade mid LAD lesion with stent placed, noted patent RCA and OM stents, restarted on plavix\par -had f/u last 11/20 with EKG done, told doing well, labs slip given- f/u pending.  Reports had labs done per cardio 1/21\par -denies clinical bleeding\par \par Exercising: walks ~ 2 miles 3-4x/wk, feels well with doing\par Denies f/c, cough, CP, sob, dizziness or GI complaints. \par \par States seen by optho 1/21- told stable findings, no med changes made and to f/u in 6mo\par \par No new  issues, chronic stable frequency\par  \par

## 2021-01-28 NOTE — REVIEW OF SYSTEMS
[see HPI] : see HPI [Negative] : Neurological [Dysuria] : no dysuria [Suicidal] : not suicidal [Depression] : no depression

## 2021-01-28 NOTE — PHYSICAL EXAM
[General Appearance - Alert] : alert [General Appearance - In No Acute Distress] : in no acute distress [General Appearance - Well-Appearing] : healthy appearing [Sclera] : the sclera and conjunctiva were normal [PERRL With Normal Accommodation] : pupils were equal in size, round, and reactive to light [Extraocular Movements] : extraocular movements were intact [Outer Ear] : the ears and nose were normal in appearance [Nasal Cavity] : the nasal mucosa and septum were normal [Oropharynx] : the oropharynx was normal [Neck Appearance] : the appearance of the neck was normal [Thyroid Diffuse Enlargement] : the thyroid was not enlarged [Respiration, Rhythm And Depth] : normal respiratory rhythm and effort [Auscultation Breath Sounds / Voice Sounds] : lungs were clear to auscultation bilaterally [Heart Rate And Rhythm] : heart rate was normal and rhythm regular [Heart Sounds] : normal S1 and S2 [Murmurs] : no murmurs [Full Pulse] : the pedal pulses are present [Edema] : there was no peripheral edema [Bowel Sounds] : normal bowel sounds [Abdomen Soft] : soft [Abdomen Tenderness] : non-tender [Cervical Lymph Nodes Enlarged Posterior Bilaterally] : posterior cervical [Cervical Lymph Nodes Enlarged Anterior Bilaterally] : anterior cervical [Supraclavicular Lymph Nodes Enlarged Bilaterally] : supraclavicular [No CVA Tenderness] : no ~M costovertebral angle tenderness [No Spinal Tenderness] : no spinal tenderness [Abnormal Walk] : normal gait [Musculoskeletal - Swelling] : no joint swelling seen [No Focal Deficits] : no focal deficits [Oriented To Time, Place, And Person] : oriented to person, place, and time [Affect] : the affect was normal [Mood] : the mood was normal [Declined Rectal Exam] : declined rectal exam [Arterial Pulses Carotid] : carotid pulses were normal with no bruits [Abdominal Aorta] : the abdominal aorta was normal [] : no hepato-splenomegaly [FreeTextEntry1] : scattered freckles

## 2021-01-28 NOTE — ASSESSMENT
[FreeTextEntry1] : \par 74 yo M pmhx HTN, HLD, CAD s/p stents, preDM, anxiety/depression, FL, glaucoma, BPH, lung nodules for CPE\par \par anxiety/depression- stable mood, denies acute sx's\par -on lorazepam prn, refilled (iSTOP: [id number])\par -declines BH referral\par -advised to f/u if sx's worsen\par \par CAD hx stents (last 10/20), HTN, HLD- 1/21 (by cardio) Tchol 138  LDL 73 HDL 40 ; LFTs wnl\par -hx self d/c'd lovaza 1/14 due to cost (no hx adverse rxn)\par -hx cardiac cath 10/20- noted high grade mid LAD lesion with stent placed, noted patent RCA and OM stents. \par -off pravastatin 2/2 diarrhea with use\par -restarted crestor since 11/19, tolerating w/o SEs\par -on ASA, plavix and ramipril\par -to consider OTC fish oil, to discuss with cardio\par -followed by cardio (Dr. Alcocer, [facility]) - last seen 11/20 told doing well, labs slip given- f/u pending. Reports had labs done per cardio 1/21\par -9/19 cmp/TSH wnl\par -12/19 UA no prt\par -1/21 (cardio lab) cmp wnl, except glc 104 ca 8.5 (nl 8.6)\par -check bmp/A1c/TSH, screening UA/prt:crt\par \par preDM- 9/19 A1c 5.5 - resolved\par -ADA diet and exercise counseled\par -check A1c\par \par hx microscopic hematuria, frequency- stable, presumed BPH\par -off myrbetriq 2/2 constipation\par -followed by [facility], Dr. Ortiz- seen 4/19 when told likely 2/2 BPH and advised to cont oxybutynin\par -self d/c'd oxybutynin since  visit as did not like taking it- declines medication use at this time\par -1/19 bmp/UA/PSA unremarkable- check repeat\par -advised to f/u if sx's worsen, dysuria, hesitancy, etc.\par \par hx fatty liver, colon polyps- asx\par -6/19 screening colonoscopy: +polyps, rec: repeat in 5 yrs\par -9/19 US abdomen: fatty liver, planned to repeat in 1 yr\par -followed by GI, Dr. Cano\par -1/21 LFTs wnl\par -low fat diet, exercise advised\par \par hx glaucoma, detached retina repair- asx\par -followed by optho. Seen q 6mo. Last  1/21- told stable findings, no med changes made and to f/u in 6mo.\par -on gtt\par \par hx right neck/shoulder/ left upper back radiculopathy pain, hip pain, hx lumbar stenosis- asx except intermittent, not bothersome shoulder joint pain per pt.\par -hx injection 11/15\par -hx followed by ortho, seen- 5/17, told cervical DJD and tendonitis with naproxen/PT advised- pt declined. \par -hx followed for hip/lumbar stenosis at [facility]- seen 2/18, s/p home PT on own with improvement\par -on Tylenol prn\par \par hx b/l hearing loss- stable\par -followed by ENT, has f/u 2/21\par -using hearing aides, notes lost 1 in 12/20- considering replacing\par -advised to avoid qtips, use debrox with cotton prn\par \par hx left hand pain- dx'd L- de Quervain's and is s/p injection x 2 (last 10/18)- now resolved. 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 \par Few patchy opacities are noted in both upper and right middle lobes. Follow-up CT scan is \par recommended in 3 months to ensure resolution. Lung RADS 2: Category benign appearance, continue yearly screening. (hx doing scan concurrent with URI sx's)\par -1/20 CT lung screening: (c/w 9/19) Near complete resolution of the opacities/consolidations in the right upper lobe and the right middle lobe when compared to previous exam.  Stable few small nodules in the right middle lobe when compared to previous exam.  Lungs RADS 2: benign, rec: annual screening.  Rx for repeat placed, pt agreeable\par -Cont'd smoking cessation advised.\par -advised prompt medical eval if cough, fever, etc.\par \par \par MISC: Continued social distancing and measure for covid19 prevention encouraged. \par -hx covid vaccine (Moderna) ##1 1/12/21- min arm soreness (resolved).  shot #2 pending 2/21\par -declines check screening AB\par \par \par HCM\par -check screening labs; declines HIV/STD screening\par -hx flu shot 10/20 at pharmacy\par -hx PVX 4/11\par -hx prevnar 11/15\par -hx Tdap 7/14\par -hx shingles vaccine 7/20/11\par -hx shingrix series at pharmacy, finished 1/19\par -hx colonoscopy 6/19: colon polyps, rec: repeat in 5 yrs (due 2024) (Dr. Cano)\par -hx nl AAA screening US in 2018 by cardio per pt, declines repeat.  Asked to forward record.\par followed by derm, yearly screening advised. Regular use of sun block for skin cancer prevention counseled.\par -HCP: sonChip- has copy at home, asked to bring copy for chart\par -yearly dental screening advised\par \par Pt's cell: [phone]. \par

## 2021-01-28 NOTE — DATA REVIEWED
[FreeTextEntry1] : \par 1/18/21 outside lab (to be scanned into chart)\par \par Tchol 138  LDL 73 HDL 40\par cmp wnl, except glc 104 ca 8.5 (nl 8.6)

## 2021-02-01 LAB
25(OH)D3 SERPL-MCNC: 55.1 NG/ML
ANION GAP SERPL CALC-SCNC: 13 MMOL/L
APPEARANCE: CLEAR
BACTERIA UR CULT: NORMAL
BACTERIA: NEGATIVE
BASOPHILS # BLD AUTO: 0.02 K/UL
BASOPHILS NFR BLD AUTO: 0.4 %
BILIRUBIN URINE: NEGATIVE
BLOOD URINE: NORMAL
BUN SERPL-MCNC: 15 MG/DL
CALCIUM OXALATE CRYSTALS: ABNORMAL
CALCIUM SERPL-MCNC: 9.3 MG/DL
CHLORIDE SERPL-SCNC: 101 MMOL/L
CO2 SERPL-SCNC: 23 MMOL/L
COLOR: YELLOW
CREAT SERPL-MCNC: 1.01 MG/DL
CREAT SPEC-SCNC: 127 MG/DL
CREAT/PROT UR: 0.1 RATIO
EOSINOPHIL # BLD AUTO: 0.12 K/UL
EOSINOPHIL NFR BLD AUTO: 2.3 %
ESTIMATED AVERAGE GLUCOSE: 117 MG/DL
FOLATE SERPL-MCNC: 17.1 NG/ML
GLUCOSE QUALITATIVE U: NEGATIVE
GLUCOSE SERPL-MCNC: 90 MG/DL
HBA1C MFR BLD HPLC: 5.7 %
HCT VFR BLD CALC: 46.8 %
HGB BLD-MCNC: 15.4 G/DL
HYALINE CASTS: 0 /LPF
IMM GRANULOCYTES NFR BLD AUTO: 0.2 %
KETONES URINE: NEGATIVE
LEUKOCYTE ESTERASE URINE: NEGATIVE
LYMPHOCYTES # BLD AUTO: 1.22 K/UL
LYMPHOCYTES NFR BLD AUTO: 23.6 %
MAN DIFF?: NORMAL
MCHC RBC-ENTMCNC: 31.1 PG
MCHC RBC-ENTMCNC: 32.9 GM/DL
MCV RBC AUTO: 94.5 FL
MICROSCOPIC-UA: NORMAL
MONOCYTES # BLD AUTO: 0.71 K/UL
MONOCYTES NFR BLD AUTO: 13.7 %
NEUTROPHILS # BLD AUTO: 3.09 K/UL
NEUTROPHILS NFR BLD AUTO: 59.8 %
NITRITE URINE: NEGATIVE
PH URINE: 6
PLATELET # BLD AUTO: 199 K/UL
POTASSIUM SERPL-SCNC: 4.7 MMOL/L
PROT UR-MCNC: 8 MG/DL
PROTEIN URINE: NEGATIVE
PSA SERPL-MCNC: 2.03 NG/ML
RBC # BLD: 4.95 M/UL
RBC # FLD: 12 %
RED BLOOD CELLS URINE: 1 /HPF
SODIUM SERPL-SCNC: 137 MMOL/L
SPECIFIC GRAVITY URINE: 1.02
SQUAMOUS EPITHELIAL CELLS: 0 /HPF
TSH SERPL-ACNC: 2.41 UIU/ML
UROBILINOGEN URINE: NORMAL
VIT B12 SERPL-MCNC: 256 PG/ML
WBC # FLD AUTO: 5.17 K/UL
WHITE BLOOD CELLS URINE: 0 /HPF

## 2021-03-02 ENCOUNTER — APPOINTMENT (OUTPATIENT)
Dept: ORTHOPEDIC SURGERY | Facility: CLINIC | Age: 76
End: 2021-03-02
Payer: MEDICARE

## 2021-03-02 VITALS — HEART RATE: 80 BPM | DIASTOLIC BLOOD PRESSURE: 70 MMHG | SYSTOLIC BLOOD PRESSURE: 120 MMHG

## 2021-03-02 DIAGNOSIS — M19.041 PRIMARY OSTEOARTHRITIS, RIGHT HAND: ICD-10-CM

## 2021-03-02 DIAGNOSIS — M19.042 PRIMARY OSTEOARTHRITIS, LEFT HAND: ICD-10-CM

## 2021-03-02 PROCEDURE — 99214 OFFICE O/P EST MOD 30 MIN: CPT | Mod: 25

## 2021-03-02 PROCEDURE — 20550 NJX 1 TENDON SHEATH/LIGAMENT: CPT | Mod: RT

## 2021-03-02 PROCEDURE — 99072 ADDL SUPL MATRL&STAF TM PHE: CPT

## 2021-03-08 ENCOUNTER — FORM ENCOUNTER (OUTPATIENT)
Age: 76
End: 2021-03-08

## 2021-03-08 ENCOUNTER — APPOINTMENT (OUTPATIENT)
Dept: ORTHOPEDIC SURGERY | Facility: CLINIC | Age: 76
End: 2021-03-08
Payer: MEDICARE

## 2021-03-08 VITALS — SYSTOLIC BLOOD PRESSURE: 125 MMHG | DIASTOLIC BLOOD PRESSURE: 76 MMHG | HEART RATE: 67 BPM

## 2021-03-08 PROCEDURE — 73030 X-RAY EXAM OF SHOULDER: CPT | Mod: RT

## 2021-03-08 PROCEDURE — 99072 ADDL SUPL MATRL&STAF TM PHE: CPT

## 2021-03-08 PROCEDURE — 72110 X-RAY EXAM L-2 SPINE 4/>VWS: CPT

## 2021-03-08 PROCEDURE — 72050 X-RAY EXAM NECK SPINE 4/5VWS: CPT

## 2021-03-08 PROCEDURE — 99214 OFFICE O/P EST MOD 30 MIN: CPT

## 2021-03-08 NOTE — ASSESSMENT
[FreeTextEntry1] : I had a lengthy discussion with the patient regards to his treatment plan and diagnosis.  I am concerned in regards to him having hyperreflexia as well as possible congenital stenosis on these cervical x-rays.  As result I would like to proceed with a cervical MRI to evaluate for any spinal cord or nerve root impingement.  In terms of his right shoulder pain and his low back pain I would like to proceed with a formal course of physical therapy.  The patient has had physical therapy in the past but I would like him to have a another round starting today.  An order for this is been placed today.  I will see him back in the office in approximately 2 to 3 weeks for repeat clinical evaluation.  I encouraged him to follow-up sooner if he has any new or worsening symptoms.  Please note that over 30 minutes of time was spent in previsit preparation, post visit documentation as well as in person visit.

## 2021-03-08 NOTE — HISTORY OF PRESENT ILLNESS
[de-identified] : This is a 75-year-old male that is been dealing with right shoulder, right hand paresthesias as well as left hip pain for a number of years.  Over the past several months the symptoms have acutely worsened.  In terms of his neck he feels stiffness between his shoulder blades.  Majority of his symptoms are centered over his right lateral shoulder and radiates into his right hand.  In particular he notes numbness and tingling in his index finger middle finger and ring finger.  He denies any issues with balance.  He denies any issues with dropping objects.\par \par The patient also has back pain as well as left hip pain.  The majority of his symptoms are in his left hip area.  Physical therapy has been tried for both his back and his neck but unfortunately he is still having substantial symptoms.  He denies any issues with range of motion of his left hip.  He denies any issues with weakness.  He denies any numbness or tingling.  He denies any bowel bladder issues or saddle anesthesia.

## 2021-03-08 NOTE — PHYSICAL EXAM
[de-identified] : Cervical Physical Exam\par \par Gait -normal\par \par Station -normal\par \par Sagittal balance -normal\par \par Compensatory mechanism? -None\par \par Horizontal gaze -maintained\par \par Heel walk -normal\par \par Toe walk -normal\par \par Reflexes\par Biceps -hyperreflexic\par Triceps -hyperreflexic\par Brachioradialis -hyperreflexic\par Patellar -hyperreflexic\par Gastroc -hyperreflexic\par Clonus -no\par \par Trouble with tandem gait\par \par Ortiz´s -none\par \par Shoulder Exam -the patient does have some soreness with forward elevation in the scapular plane.  Some pain with external rotation of his right shoulder\par \par Spurling´s -positive\par \par Wrist Pulses -2+ radial/ulnar\par \par Foot Pulses -2+ DP/PT\par \par Cervical range of motion -globally reduced\par \par Sensation \par C5-T1 sensation intact to light touch bilaterally\par \par L1-S1 sensation intact to light touch bilaterally\par \par Motor\par \par \par 	Deltoid	Biceps	Triceps	WF	WE	IO	\par Right	4/5	5/5	5/5	5/5	5/5	5/5	5/5\par Left	5/5	5/5	5/5	5/5	5/5	5/5	5/5\par \par \par 	IP	Quad	HS	TA	Gastroc	EHL\par Right	5/5	5/5	5/5	5/5	5/5	5/5\par Left	5/5	5/5	5/5	5/5	5/5	5/5\par \par \par  [de-identified] : Right shoulder radiographs\par Glenohumeral joint in appropriate position\par Osteopenia noted\par AC joint arthritis noted\par \par Lumbar radiographs\par No instability on flexion-extension radiographs\par Facet arthropathy noted\par Lumbar lordosis maintained\par \par Cervical radiographs\par Anterior osteophytes noted\par Diffuse degenerative disc disease noted\par Cervical alignment within normal limits

## 2021-03-10 ENCOUNTER — APPOINTMENT (OUTPATIENT)
Dept: INTERNAL MEDICINE | Facility: CLINIC | Age: 76
End: 2021-03-10
Payer: MEDICARE

## 2021-03-10 DIAGNOSIS — H90.3 SENSORINEURAL HEARING LOSS, BILATERAL: ICD-10-CM

## 2021-03-10 PROCEDURE — 99442: CPT

## 2021-03-10 NOTE — HISTORY OF PRESENT ILLNESS
[Home] : at home, [unfilled] , at the time of the visit. [Medical Office: ([facility])___] : at the medical office located in  [Verbal consent obtained from patient] : the patient, [unfilled] [FreeTextEntry1] : f/u [de-identified] : \par As this is a telemedicine visit, no physical exam could be performed.  Diagnosis and treatment is based on symptoms provided.\par \par \par 75 yo M pmhx HTN, HLD, CAD s/p stents, preDM, anxiety/depression, FL, glaucoma, BPH, lung nodules for f/u\par \par Feeling well, no complaints.\par Needs med refill.\par \par \par Reports is socially distancing and using precautions for covid prevention.\par Denies sick or covid positive contacts.\par Denies fever, chills, cough or sob.\par -hx covid vaccine (Moderna) series - finished 2/14/21\par \par \par hx anxiety/depression- reports stable anxiety- using lorazepam 1-2x/wk.  Notes mildly low mood on/off, denies HI/SI or panic attacks\par -sleeping and eating well\par -notes son is currently stable, but still struggling with ETOH abuse. Is sober and now living alone in [facility]- overall going okay. Remain in close contact. Remains out of rehab/hospital. Remain on good speaking terms. Feels safe.\par -reports has good social support from friends and neighbors and remains connected despite limitations posed by covid pandemic\par -does not feel needs therapist \par -notes mild anxiety 2/2 covid 19 pandemic on/off. No direct persons known persons ill with covid. \par \par Seen by cardio had echo and routine stress test 9/20 and is s/p cardiac cath 10/20- noted high grade mid LAD lesion with stent placed, noted patent RCA and OM stents, restarted on plavix\par -had f/u last 11/20 with EKG done, told doing well, labs slip given- f/u pending.  Reports had labs done per cardio 1/21\par -denies clinical bleeding\par \par Exercising: walks ~ 2 miles 3-4x/wk, feels well with doing\par Denies cough, CP, sob, dizziness or GI complaints. \par \par hx right neck pain/numbness with radiation to right shoulder pain, back pain- overall stable\par -followed by ortho, seen 3/21 with xrays done, advised MRI c-spine (pending 3/21) for eval of cervical radiculopathy, PT for shoulder/LB.  Has f/u 3/29/21\par -on Tylenol or motrin prn (planned x 1 week per ortho)\par \par hx right wrist pain x 1 mo- seen by hand ortho 3/21 dx'd with de Quervain's tendonitis with cortisone injection given- notes much better since, no oral pain meds used\par \par \par No new  issues, chronic stable frequency\par  \par

## 2021-03-10 NOTE — ASSESSMENT
[FreeTextEntry1] : \par 75 yo M pmhx HTN, HLD, CAD s/p stents, preDM, anxiety/depression, FL, glaucoma, BPH, lung nodules for f/u\par \par anxiety/depression- stable mood, denies acute sx's\par -on lorazepam prn, refilled ([facility]STOP: [id number])\par -declines  referral\par -advised to f/u if sx's worsen\par \par CAD hx stents (last 10/20), HTN, HLD- 1/21 (by cardio) Tchol 138  LDL 73 HDL 40; LFTs wnl\par -hx self d/c'd Lovaza 1/14 due to cost (no hx adverse rxn)\par -hx cardiac cath 10/20- noted high grade mid LAD lesion with stent placed, noted patent RCA and OM stents. \par -off pravastatin 2/2 diarrhea with use\par -restarted Crestor since 11/19, tolerating w/o SEs\par -on ASA, Plavix and ramipril\par -to consider OTC fish oil, to discuss with cardio\par -followed by cardio (Dr. Alcocer, [facility]) - last seen 11/20 told doing well, labs slip given- f/u pending after 6mo. Had labs done per cardio 1/21\par -1/21 (cardio lab) cmp wnl, except glc 104 ca 8.5 (nl 8.6)\par -1/21 bmp/TSH, screening UA/prt:crt unremarkable\par \par preDM- 1/21 A1c 5.7 (was 5.5)\par -ADA diet and exercise counseled\par \par hx microscopic hematuria, frequency- stable, presumed BPH\par -off myrbetriq 2/2 constipation\par -followed by , Dr. Ortiz- seen 4/19 when told likely 2/2 BPH and advised to cont oxybutynin\par -self d/c'd oxybutynin since  visit as did not like taking it- declines medication use at this time\par -1/21 bmp/PSA/UA/Ucx unremarkable\par -advised to f/u if sx's worsen, dysuria, hesitancy, etc.\par \par hx fatty liver, colon polyps- asx\par -6/19 screening colonoscopy: +polyps, rec: repeat in 5 yrs\par -9/19 US abdomen: fatty liver, planned to repeat in 1 yr\par -followed by GI, Dr. Cano\par -1/21 LFTs wnl\par -low fat diet, exercise advised\par \par hx glaucoma, detached retina repair- asx\par -followed by optho. Seen q 6mo. Last  1/21- told stable findings, no med changes made and to f/u in 6mo.\par -on gtt\par \par hx right neck/shoulder/ left upper back radiculopathy pain, hip pain, hx lumbar stenosis-\par -hx injection 11/15\par -hx [facility]- seen 2/18, s/p home PT on own with improvement\par -followed by ortho, Dr. Oneal- seen 3/21 with xrays done, advised MRI c-spine (pending 3/21), PT for shoulder/LB. Has f/u 3/29/21\par -PT pending\par -on Tylenol prn, occasional motrin\par \par hx b/l hearing loss- stable\par -followed by ENT, seen f/u 2/21- has f/u 3/21\par -hx using hearing aides, notes lost 1 in 12/20- in process of replacing as told can get refunded \par -advised to avoid qtips, use debrox with cotton prn\par \par hx left hand pain- dx'd L- de Quervain's and is s/p injection x 2 (last 10/18)- now resolved.  hx similar sx's on right s/p injection 3/21 with improvement\par -8/18 xray: STT joint OA \par -followed by hand ortho, seen 3/21\par -on Tylenol prn\par \par hx skin rash- hives, unclear etiology, s/p bx by derm 9/17 c/w ?hypersensitivity- resolved \par -followed by derm, seen 2021 per pt with nl FBSE\par -followed by A/I - s/p negative patch testing, also found NOT allergic to PCN\par \par hx former tobacco use, hx abnl screening CT scan 9/19- asx\par -hx > 30 yr 1ppd, quit > 20 yrs \par -9/19 CT lung screening: Few less than 0.5 cm solid nodules are noted in the right middle lobe. \par Few patchy opacities are noted in both upper and right middle lobes. Follow-up CT scan is \par recommended in 3 months to ensure resolution. Lung RADS 2: Category benign appearance, continue yearly screening. (hx doing scan concurrent with URI sx's)\par -1/20 CT lung screening: (c/w 9/19) Near complete resolution of the opacities/consolidations in the right upper lobe and the right middle lobe when compared to previous exam.  Stable few small nodules in the right middle lobe when compared to previous exam.  Lungs RADS 2: benign, rec: annual screening.  Rx for repeat placed (pending) pt agreeable\par -Cont'd smoking cessation advised.\par -advised prompt medical eval if cough, fever, etc.\par \par \par MISC: Continued social distancing and measure for covid19 prevention encouraged. \par -hx covid vaccine (Moderna) series - finished 2/21\par \par \par HCM\par -hx CPE 1/21\par -1/21 cbc/bmp/TSH/B12/folate/vit d/PSA/UA unremarkable\par -hx flu shot 10/20 at pharmacy\par -hx PVX 4/11\par -hx prevnar 11/15\par -hx Tdap 7/14\par -hx shingles vaccine 7/20/11\par -hx shingrix series at pharmacy, finished 1/19\par -hx colonoscopy 6/19: colon polyps, rec: repeat in 5 yrs (due 2024) (Dr. Cano)\par -hx nl AAA screening US in 2018 by cardio per pt, declines repeat.  Asked to forward record.\par followed by derm, yearly screening advised. Regular use of sun block for skin cancer prevention counseled.\par -HCP: sonChip- has copy at home, asked to bring copy for chart\par \par Pt's cell: [phone]. \par \par To f/u in 1 mo for med refill, will call to schedule.\par \par

## 2021-03-21 ENCOUNTER — APPOINTMENT (OUTPATIENT)
Dept: MRI IMAGING | Facility: CLINIC | Age: 76
End: 2021-03-21
Payer: MEDICARE

## 2021-03-21 ENCOUNTER — OUTPATIENT (OUTPATIENT)
Dept: OUTPATIENT SERVICES | Facility: HOSPITAL | Age: 76
LOS: 1 days | End: 2021-03-21
Payer: MEDICARE

## 2021-03-21 DIAGNOSIS — K46.9 UNSPECIFIED ABDOMINAL HERNIA WITHOUT OBSTRUCTION OR GANGRENE: Chronic | ICD-10-CM

## 2021-03-21 DIAGNOSIS — M54.12 RADICULOPATHY, CERVICAL REGION: ICD-10-CM

## 2021-03-21 DIAGNOSIS — M54.2 CERVICALGIA: ICD-10-CM

## 2021-03-21 PROCEDURE — 72141 MRI NECK SPINE W/O DYE: CPT | Mod: 26

## 2021-03-21 PROCEDURE — 72141 MRI NECK SPINE W/O DYE: CPT

## 2021-03-29 ENCOUNTER — FORM ENCOUNTER (OUTPATIENT)
Age: 76
End: 2021-03-29

## 2021-03-29 ENCOUNTER — APPOINTMENT (OUTPATIENT)
Dept: ORTHOPEDIC SURGERY | Facility: CLINIC | Age: 76
End: 2021-03-29
Payer: MEDICARE

## 2021-03-29 VITALS
HEIGHT: 66 IN | SYSTOLIC BLOOD PRESSURE: 123 MMHG | BODY MASS INDEX: 25.71 KG/M2 | HEART RATE: 76 BPM | DIASTOLIC BLOOD PRESSURE: 65 MMHG | WEIGHT: 160 LBS

## 2021-03-29 PROCEDURE — 99214 OFFICE O/P EST MOD 30 MIN: CPT

## 2021-03-29 PROCEDURE — 99072 ADDL SUPL MATRL&STAF TM PHE: CPT

## 2021-03-29 NOTE — PHYSICAL EXAM
[de-identified] : Cervical Physical Exam\par \par Gait -normal\par \par Station -normal\par \par Sagittal balance -normal\par \par Compensatory mechanism? -None\par \par Horizontal gaze -maintained\par \par Heel walk -normal\par \par Toe walk -normal\par \par Reflexes\par Biceps -hyperreflexic\par Triceps -hyperreflexic\par Brachioradialis -hyperreflexic\par Patellar -hyperreflexic\par Gastroc -hyperreflexic\par Clonus -no\par \par Trouble with tandem gait\par \par Ortiz´s -none\par \par Shoulder Exam -shoulder range of motion is improved as compared to previous examination\par \par Spurling´s -positive\par \par Wrist Pulses -2+ radial/ulnar\par \par Foot Pulses -2+ DP/PT\par \par Cervical range of motion -globally reduced\par \par Sensation \par C5-T1 sensation intact to light touch bilaterally\par \par L1-S1 sensation intact to light touch bilaterally\par \par Motor\par \par \par 	Deltoid	Biceps	Triceps	WF	WE	IO	\par Right	4/5	5/5	5/5	5/5	5/5	5/5	5/5\par Left	5/5	5/5	5/5	5/5	5/5	5/5	5/5\par \par \par 	IP	Quad	HS	TA	Gastroc	EHL\par Right	5/5	5/5	5/5	5/5	5/5	5/5\par Left	5/5	5/5	5/5	5/5	5/5	5/5\par \par The patient's exam is largely unchanged today\par  [de-identified] : Cervical MRI reviewed\par There are areas of mild to moderate stenosis in the neuroforamen from C3-C4, C4-C5, C6-C7.\par There is cervical spondylosis as well\par There is flattening of the ventral cord and mild to moderate stenosis at C6-C7

## 2021-03-29 NOTE — ASSESSMENT
[FreeTextEntry1] : I had a lengthy discussion with the patient regards to his treatment plan and diagnosis.  He does have neck pain and radicular pain down his right arm with some areas of right-sided foraminal stenosis in his subaxial spine.  Therefore I would like to refer the patient to Dr. Alexander for consideration for cervical epidural steroid injection.  An order for this has been placed today.  In terms of his low back symptoms he does have symptoms concerning for lumbar radiculopathy.  Furthermore these have persisted despite conservative measures.  Therefore I would like to proceed with a lumbar MRI.  He should continue with his home exercise program and I would also encourage him to walk as much as he can tolerate.  I will see him back in approximately 3 to 4 weeks for repeat clinical evaluation.  I encouraged him to follow-up sooner if he has any new or worsening symptoms.  Please note that over 30 minutes of time was spent in care of this patient which includes previsit preparation, in person visit, post visit documentation.

## 2021-03-29 NOTE — HISTORY OF PRESENT ILLNESS
[de-identified] : This is a 75-year-old male that is been dealing with right shoulder, right hand paresthesias as well as left hip pain for a number of years.  Over the past several months the symptoms have acutely worsened.  In terms of his neck he feels stiffness between his shoulder blades.  Majority of his symptoms are centered over his right lateral shoulder and radiates into his right hand.  In particular he notes numbness and tingling in his index finger middle finger and ring finger.  He denies any issues with balance.  He denies any issues with dropping objects.\par \par The patient also has back pain as well as left hip pain.  The majority of his symptoms are in his left hip area.  Physical therapy has been tried for both his back and his neck but unfortunately he is still having substantial symptoms.  He denies any issues with range of motion of his left hip.  He denies any issues with weakness.  He denies any numbness or tingling.  He denies any bowel bladder issues or saddle anesthesia.\par \par The above history is from the patient's previous examination.\par \par Since her last visit the patient still is having some neck pain as well as right-sided radicular pain down his arm.  This pain is worse at night.  He finds that he has to put his arm next to his torso in order to alleviate this pain when he is sleeping at night.  He is also having some significant low back pain as well as left-sided radicular symptoms.  Again the symptoms are worse at night.  He denies any fevers chills, unexpected weight loss.  He denies any saddle anesthesia.  He denies any bowel bladder issues.  He has been doing a home exercise program and walking as much as he can.

## 2021-04-09 ENCOUNTER — APPOINTMENT (OUTPATIENT)
Dept: PHYSICAL MEDICINE AND REHAB | Facility: CLINIC | Age: 76
End: 2021-04-09
Payer: MEDICARE

## 2021-04-09 VITALS
OXYGEN SATURATION: 98 % | HEART RATE: 76 BPM | SYSTOLIC BLOOD PRESSURE: 150 MMHG | DIASTOLIC BLOOD PRESSURE: 74 MMHG | TEMPERATURE: 97.3 F

## 2021-04-09 PROCEDURE — 99204 OFFICE O/P NEW MOD 45 MIN: CPT

## 2021-04-09 PROCEDURE — 99072 ADDL SUPL MATRL&STAF TM PHE: CPT

## 2021-04-09 NOTE — HISTORY OF PRESENT ILLNESS
[FreeTextEntry1] : Mr. DUC SOTELO  is a 76 year old male who presents with neck pain. Patient referred by Dr. Oneal.\par \par Location:mainly neck, but also low back\par Onset:Chronic, years ago but worsening over last few months\par Provocation/Palliative:Worse with activity, better at rest\par Quality:Shooting pain\par Radiation:Down R arm into R hand, can go into his L hip area\par Severity:1/10 during day, can go up to 8/10 at night\par Timing:Worst at nighttime, minimal pain during day\par \par Numbness in 2nd, 3rd and 4th digits. Denies any associated arm or hand weakness. Denies any loss of bowel/bladder control or any groin numbness.\par Previous medications trialed:Tylenol PM\par Previous procedures relevant to complaint:None\par Has tried conservative treatment?:Did PT years ago without significant relief\par

## 2021-04-09 NOTE — ASSESSMENT
[FreeTextEntry1] : Mr. DUC SOTELO is a 76 year old male who presents with mainly neck/RUE pain of gradual onset likely related to cervical spondylosis/radiculopathy, as well as more mild LBP likely due to lumbar spondylosis. Denies any red flag signs. Will recommend:\par - Discussed with patient in depth the role and risks of a cervical JAMES. Patient is currently taking ASA/Plavix for which he would have to stop prior to the procedure. As patient's pain is really only present at night and given his significant cardiac history, I recommended him do a trial of Gabapentin first. He will start with 100mg Qhs and increase to a goal of 300mg Qhs. Advised patient of side effects including sedation. \par - Patient not interested in PT at this time as he tried it in the past without relief. \par \par RTC in 3 weeks for re-evaluation. Patient knows he can call with any questions. Patient aware of red flag signs including any changes to their bowel/bladder control, groin numbness or new weakness. Patient knows to seek immediate attention by calling 911 or going to nearest ER if these symptoms appear.

## 2021-04-09 NOTE — PHYSICAL EXAM
[FreeTextEntry1] : PE:\par Constitutional: In NAD, calm and cooperative\par HEENT: NCAT, Anicteric sclera, no lid-lag\par Cardio: Extremities appear pink and well perfused, no peripheral edema\par Respiratory: Normal respiratory effort on room air, no accessory muscle use\par Skin: no rashes seen on exposed skin, no visible abrasions\par Psych: Normal affect, intact judgment and insight\par Neuro: Awake, alert and oriented x 3, see below for focused neurological exam\par MSK (Neck):\par 	Inspection: no gross swelling identified\par 	Palpation: Minimal Tenderness of the right lower cervical paraspinals\par 	ROM: Pain at end cervical extension/flexion\par 	Strength: 5/5 strength in bilateral upper extremities\par 	Reflexes: 2+ Biceps/Brachioradialis reflex bilaterally, Ortiz’s negative bilaterally\par 	Sensation: Intact to light touch in bilateral upper extremities\par 	Special tests: Spurling’s test negative bilaterally \par \par MSK (Back)\par 	Inspection: no gross swelling identified\par 	Palpation: Mild tenderness of the left lower lumbar paraspinals\par 	ROM: Pain at end lumbar extension/flexion\par 	Strength: 5/5 strength in bilateral lower extremities\par 	Reflexes: 2+ Patella reflex bilaterally, 2+ Achilles reflex bilaterally, negative clonus bilaterally\par 	Sensation: Intact to light touch in bilateral lower extremities\par Special tests:\par SLR:negative bilaterally

## 2021-04-09 NOTE — DATA REVIEWED
[FreeTextEntry1] : MRI C Spine: multilevel spondylosis\par \par   MR Cervical Spine No Cont             Final\par \par No Documents Attached\par \par \par \par \par   EXAM:  MR SPINE CERVICAL\par \par \par PROCEDURE DATE:  03/21/2021\par \par \par \par INTERPRETATION:  CERVICAL SPINE MRI\par \par CLINICAL INFORMATION: Cervical radiculopathy.\par TECHNIQUE: Multiplanar, multisequence MRI was obtained of the cervical spine.\par \par FINDINGS:\par \par DISC LEVEL EVALUATION:\par \par C1/2: Mild degenerative change. No subluxation. No spinal canal narrowing.\par C2/C3: No spinal canal or foraminal narrowing.\par C3/C4: Uncovertebral spurring and right facet arthrosis and thickening and mild left and moderate right foraminal narrowing. No spinal canal narrowing.\par C4/C5: Anterolisthesis with disc bulge asymmetric to the right and right uncovertebral spurring resulting in indentation of the thecal sac and mild to moderate right foraminal narrowing.\par C5/C6: Small central protrusion resulting in mild spinal canal narrowing. No foraminal narrowing.\par C6/C7: Disc osteophyte complex and bilateral uncovertebral spurring resulting mild to moderate spinal canal narrowing with flattening of the ventral cord and mild to moderate bilateral foraminal narrowing.\par C7/T1: No spinal canal or foraminal narrowing.\par \par ALIGNMENT: Preservation of the vertebral body heights. Straightening of the cervical lordosis with reversal spanning C4-C7. There is mild anterolisthesis of C4 on C5 and C5 on C6. Minimal anterolisthesis of C3 on C4. Loss of intervertebral disc height at C6/C7 with osteophyte formation.\par CORD: No cord signal abnormality.\par MARROW: No marrow signal abnormality.\par IMAGED BRAIN: Unremarkable.\par PERIPHERAL/NECK SOFT TISSUES: Unremarkable.\par \par IMPRESSION:\par Cervical spondylosis, most notably at C6/C7, resulting in mild to moderate spinal canal narrowing with flattening of the ventral cord and mild to moderate bilateral foraminal narrowing.\par Moderate right foraminal narrowing at C3/C4 mild to moderate right foraminal narrowing at C4/C5.\par \par \par \par \par \par \par LANDON OBRIEN MD; Attending Radiologist\par This document has been electronically signed. Mar 23 2021  3:48PM\par \par  \par \par  Ordered by: MAMI OROSCO       Collected/Examined: 21Mar2021 07:51PM       \par Verified by: MAMI OROSCO 24Mar2021 05:50PM       \par  Result Communication: Schedule appointment to discuss results;\par Stage: Final       \par  Performed at: [facility] [place]       Resulted: 23Mar2021 03:40PM       Last Updated: 24Mar2021 05:50PM       Accession: [id number]

## 2021-04-11 ENCOUNTER — APPOINTMENT (OUTPATIENT)
Dept: MRI IMAGING | Facility: CLINIC | Age: 76
End: 2021-04-11
Payer: MEDICARE

## 2021-04-11 ENCOUNTER — OUTPATIENT (OUTPATIENT)
Dept: OUTPATIENT SERVICES | Facility: HOSPITAL | Age: 76
LOS: 1 days | End: 2021-04-11
Payer: MEDICARE

## 2021-04-11 DIAGNOSIS — K46.9 UNSPECIFIED ABDOMINAL HERNIA WITHOUT OBSTRUCTION OR GANGRENE: Chronic | ICD-10-CM

## 2021-04-11 DIAGNOSIS — M54.5 LOW BACK PAIN: ICD-10-CM

## 2021-04-11 DIAGNOSIS — R20.0 ANESTHESIA OF SKIN: ICD-10-CM

## 2021-04-11 DIAGNOSIS — M54.16 RADICULOPATHY, LUMBAR REGION: ICD-10-CM

## 2021-04-11 PROCEDURE — 72148 MRI LUMBAR SPINE W/O DYE: CPT | Mod: 26

## 2021-04-11 PROCEDURE — 72148 MRI LUMBAR SPINE W/O DYE: CPT

## 2021-04-30 ENCOUNTER — APPOINTMENT (OUTPATIENT)
Dept: ORTHOPEDIC SURGERY | Facility: CLINIC | Age: 76
End: 2021-04-30
Payer: MEDICARE

## 2021-04-30 VITALS
BODY MASS INDEX: 25.71 KG/M2 | SYSTOLIC BLOOD PRESSURE: 129 MMHG | DIASTOLIC BLOOD PRESSURE: 72 MMHG | WEIGHT: 160 LBS | HEART RATE: 69 BPM | HEIGHT: 66 IN

## 2021-04-30 PROCEDURE — 99214 OFFICE O/P EST MOD 30 MIN: CPT

## 2021-04-30 PROCEDURE — 99072 ADDL SUPL MATRL&STAF TM PHE: CPT

## 2021-05-02 NOTE — ASSESSMENT
[FreeTextEntry1] : I had a long discussion with the patient in regards to his treatment plan and diagnosis.  He certainly has symptoms concerning for cervical radiculopathy and there are areas of stenosis noted on the cervical MRI.  I would like to get more information in terms of objective measurements of his nerve function.  Therefore I have ordered nerve conduction studies in order to evaluate for any peripheral nerve entrapment which might be contributing to his current symptoms.  I will have him follow-up in approximately 3 weeks for repeat clinical evaluation.  I encouraged him to follow-up sooner if he has any new or worsening symptoms.  Please note that over 30 minutes of time was spent in care of this patient which includes previsit preparation, in person visit, post visit documentation.

## 2021-05-02 NOTE — PHYSICAL EXAM
[de-identified] : Cervical Physical Exam\par \par Gait -normal\par \par Station -normal\par \par Sagittal balance -normal\par \par Compensatory mechanism? -None\par \par Horizontal gaze -maintained\par \par Heel walk -normal\par \par Toe walk -normal\par \par Reflexes\par Biceps -hyperreflexic\par Triceps -hyperreflexic\par Brachioradialis -hyperreflexic\par Patellar -hyperreflexic\par Gastroc -hyperreflexic\par Clonus -no\par \par Trouble with tandem gait\par \par Ortiz´s -none\par \par Shoulder Exam -shoulder range of motion is improved as compared to previous examination\par \par Spurling´s -positive\par \par Wrist Pulses -2+ radial/ulnar\par \par Foot Pulses -2+ DP/PT\par \par Cervical range of motion -globally reduced\par \par Sensation \par C5-T1 sensation intact to light touch bilaterally\par \par L1-S1 sensation intact to light touch bilaterally\par \par Motor\par \par \par 	Deltoid	Biceps	Triceps	WF	WE	IO	\par Right	4/5	5/5	5/5	5/5	5/5	5/5	5/5\par Left	5/5	5/5	5/5	5/5	5/5	5/5	5/5\par \par \par 	IP	Quad	HS	TA	Gastroc	EHL\par Right	5/5	5/5	5/5	5/5	5/5	5/5\par Left	5/5	5/5	5/5	5/5	5/5	5/5\par \par The patient's exam is largely unchanged today 04/30/21\par  [de-identified] : Cervical MRI reviewed\par There are areas of mild to moderate stenosis in the neuroforamen from C3-C4, C4-C5, C6-C7.\par There is cervical spondylosis as well\par There is flattening of the ventral cord and  moderate stenosis at C6-C7

## 2021-05-02 NOTE — HISTORY OF PRESENT ILLNESS
[de-identified] : This is a 75-year-old male that is been dealing with right shoulder, right hand paresthesias as well as left hip pain for a number of years.  Over the past several months the symptoms have acutely worsened.  In terms of his neck he feels stiffness between his shoulder blades.  Majority of his symptoms are centered over his right lateral shoulder and radiates into his right hand.  In particular he notes numbness and tingling in his index finger middle finger and ring finger.  He denies any issues with balance.  He denies any issues with dropping objects.\par \par The patient also has back pain as well as left hip pain.  The majority of his symptoms are in his left hip area.  Physical therapy has been tried for both his back and his neck but unfortunately he is still having substantial symptoms.  He denies any issues with range of motion of his left hip.  He denies any issues with weakness.  He denies any numbness or tingling.  He denies any bowel bladder issues or saddle anesthesia.\par \par The above history is from the patient's previous examination.\par \par Today the patient states that he still having significant neck pain and right arm pain.  He is taking gabapentin which does help him.  He does feel good in terms of his participation in physical therapy.  He denies any balance issues.  He denies any issues with him dexterity.  He denies any bowel bladder issues.  He denies any saddle anesthesia.  He is still having pain down his right lateral arm and into his forearm and hand.\par

## 2021-05-07 ENCOUNTER — APPOINTMENT (OUTPATIENT)
Dept: PHYSICAL MEDICINE AND REHAB | Facility: CLINIC | Age: 76
End: 2021-05-07
Payer: MEDICARE

## 2021-05-07 VITALS — SYSTOLIC BLOOD PRESSURE: 151 MMHG | OXYGEN SATURATION: 97 % | DIASTOLIC BLOOD PRESSURE: 76 MMHG | HEART RATE: 74 BPM

## 2021-05-07 PROCEDURE — 99213 OFFICE O/P EST LOW 20 MIN: CPT

## 2021-05-07 PROCEDURE — 99072 ADDL SUPL MATRL&STAF TM PHE: CPT

## 2021-05-07 RX ORDER — GABAPENTIN 100 MG/1
100 CAPSULE ORAL
Qty: 90 | Refills: 0 | Status: DISCONTINUED | COMMUNITY
Start: 2021-04-09 | End: 2021-05-07

## 2021-05-07 NOTE — PHYSICAL EXAM
[FreeTextEntry1] : PE:\par Constitutional: In NAD, calm and cooperative\par HEENT: NCAT, Anicteric sclera, no lid-lag\par Cardio: Extremities appear pink and well perfused, no peripheral edema\par Respiratory: Normal respiratory effort on room air, no accessory muscle use\par Skin: no rashes seen on exposed skin, no visible abrasions\par Psych: Normal affect, intact judgment and insight\par Neuro: Awake, alert and oriented x 3, see below for focused neurological exam\par MSK (Neck):\par 	Inspection: no gross swelling identified\par 	Palpation: Minimal Tenderness of the right lower cervical paraspinals\par 	ROM: Pain at end cervical extension/flexion\par 	Strength: 5/5 strength in bilateral upper extremities\par 	Reflexes: 2+ Biceps/Brachioradialis reflex bilaterally, Ortiz’s negative bilaterally\par 	Sensation: Intact to light touch in bilateral upper extremities\par 	Special tests: Spurling’s test negative bilaterally \par

## 2021-05-07 NOTE — HISTORY OF PRESENT ILLNESS
[FreeTextEntry1] : Mr. DUC SOTELO is a 76 year old  male who presents for follow up. At last visit, patient was trialed on Gabapentin. Patient has since seen Dr. Oneal who ordered an EMG for his cervical radicular symptoms to evaluate for peripheral nerve entrapment. Patient says he got too groggy with the gabapentin so he stopped the medication.  No other new symptoms. \par \par Location:mainly neck at this time. \par Onset:Chronic, years ago but worsening over last few months\par Provocation/Palliative:Worse with activity, better at rest\par Quality:Shooting pain\par Radiation:Down R arm into R hand, can go into his L hip area\par Severity:1/10 during day, can go up to 8/10 at night\par Timin% at night, not really anything in the day.\par \par No bowel/bladder changes. No groin numbness.

## 2021-05-07 NOTE — ASSESSMENT
[FreeTextEntry1] : Mr. DUC SOTELO is a 76 year old male who presents with mainly neck/RUE pain of gradual onset likely related to cervical spondylosis/radiculopathy. Denies any red flag signs. Will recommend:\par - Patient has stopped the gabapentin as he was getting too groggy from it. Spoke to patient about a trial of Lyrica for which he would like to hold off for now. \par - Patient to try Tylenol Arthritis 2 tabs at night for now. \par - He is pending an EMG next month.\par - Patient not interested in PT at this time as he tried it in the past without relief. \par \par RTC after EMG. Patient knows he can call with any questions. Patient aware of red flag signs including any changes to their bowel/bladder control, groin numbness or new weakness. Patient knows to seek immediate attention by calling 911 or going to nearest ER if these symptoms appear.

## 2021-05-10 ENCOUNTER — APPOINTMENT (OUTPATIENT)
Dept: INTERNAL MEDICINE | Facility: CLINIC | Age: 76
End: 2021-05-10
Payer: MEDICARE

## 2021-05-10 PROCEDURE — 99442: CPT

## 2021-05-10 NOTE — HISTORY OF PRESENT ILLNESS
[Home] : at home, [unfilled] , at the time of the visit. [Medical Office: ([facility])___] : at the medical office located in  [Verbal consent obtained from patient] : the patient, [unfilled] [FreeTextEntry1] : \par f/u [de-identified] : \par As this is a telemedicine visit, no physical exam could be performed.  Diagnosis and treatment is based on symptoms provided.\par \par \par 77 yo M pmhx HTN, HLD, CAD s/p stents, preDM, anxiety/depression, FL, glaucoma, BPH, lung nodules for f/u\par \par Feeling well, no complaints.\par Needs med refill.\par \par \par Reports is socially distancing and using precautions for covid prevention.\par Denies sick or covid positive contacts.\par Denies fever, chills, cough or sob.\par -hx covid vaccine (Moderna) series - finished 2/14/21\par \par \par hx anxiety/depression- reports stable anxiety- using lorazepam 1-2x/wk.  Notes mildly low mood on/off, denies HI/SI or panic attacks\par -sleeping and eating well\par -notes son is currently stable, but still struggling with ETOH abuse.  Is living alone in [facility]- overall going okay. Remain in close contact. Remains out of rehab/hospital. Remain on good speaking terms. Feels safe.\par -reports has good social support from friends and neighbors and remains connected despite limitations posed by covid pandemic, all close contacts reportedly received covid vaccines as well\par -does not feel needs therapist \par -notes mild anxiety 2/2 covid 19 pandemic on/off. \par \par hx CAD-\par -seen by cardio had echo and routine stress test 9/20 and is s/p cardiac cath 10/20- noted high grade mid LAD lesion with stent placed, noted patent RCA and OM stents, restarted on Plavix.  Had f/u last 11/20 with EKG done, told doing well, labs slip given- f/u pending tomorrow, plans to f/u re: labs at visit- states labs done 2 mo ago, called since and told all nl.\par -denies clinical bleeding\par \par Exercising: walks daily ~ 45 mins- reports feels well with doing\par Denies cough, CP, sob, dizziness or GI complaints. \par \par hx right neck pain/numbness with radiation to right shoulder pain and occasional hand tingling, back pain- overall stable\par -followed by ortho, seen 4/21 with MRI c-spine/lumbar noted.  EMG ordered (pending 5/21).  Has f/u 6/21.\par -followed by PMR, seen 5/21- noted hx gabapentin trial that stopped 2/2 drowsiness with use, declined Lyrica trial.  Tylenol ES advised, noted EMG pending- advised f/u thereafter.\par -PT pending, states hesitant as done prior w/o help\par -on Tylenol PM qhs, taking 2-3x/wk.  Not taking OTC NSAIDs.\par \par hx right wrist pain- states seen by hand ortho 3/21 dx'd with de Quervain's tendonitis with cortisone injection given- mainly resolved\par -no oral pain meds used\par \par \par No new  issues, chronic stable frequency\par  \par

## 2021-05-10 NOTE — ASSESSMENT
[FreeTextEntry1] : \par 77 yo M pmhx HTN, HLD, CAD s/p stents, preDM, anxiety/depression, FL, glaucoma, BPH, lung nodules for f/u\par \par anxiety/depression- stable mood, denies acute sx's\par -on lorazepam prn, refilled (iSTOP: [id number]  )\par -declines BH referral\par -advised to f/u if sx's worsen\par \par CAD hx stents (last 10/20), HTN, HLD- 1/21 (by cardio) Tchol 138  LDL 73 HDL 40; LFTs wnl\par -hx self d/c'd Lovaza 1/14 due to cost (no hx adverse rxn)\par -hx cardiac cath 10/20- noted high grade mid LAD lesion with stent placed, noted patent RCA and OM stents. \par -off pravastatin 2/2 diarrhea with use\par -restarted Crestor since 11/19, tolerating w/o SEs\par -on ASA, Plavix and ramipril\par -to consider OTC fish oil, to discuss with cardio\par -followed by cardio (Dr. Alcocer, [facility]) - last seen 11/20 told doing well with EKG done, told doing well, labs slip given- f/u pending tomorrow, plans to f/u re: labs at visit- states labs done 2 mo ago, called since and told all nl.\par -1/21 (cardio lab) cmp wnl, except glc 104 ca 8.5 (nl 8.6)\par -1/21 bmp/TSH, screening UA/prt:crt unremarkable\par -asked to forward med records/labs to office\par \par preDM- 1/21 A1c 5.7 (was 5.5)\par -ADA diet and exercise counseled\par \par hx microscopic hematuria, frequency- stable, presumed BPH\par -off myrbetriq 2/2 constipation\par -followed by , Dr. Ortiz- seen 4/19 when told likely 2/2 BPH and advised to cont oxybutynin\par -self d/c'd oxybutynin since  visit as did not like taking it- declines medication use at this time\par -1/21 bmp/PSA/UA/Ucx unremarkable\par -advised to f/u if sx's worsen, dysuria, hesitancy, etc.\par \par hx fatty liver, colon polyps- asx\par -6/19 screening colonoscopy: +polyps, rec: repeat in 5 yrs\par -9/19 US abdomen: fatty liver, planned to repeat in 1 yr\par -followed by GI, Dr. Cano\par -1/21 LFTs wnl\par -low fat diet, exercise advised\par \par hx glaucoma, detached retina repair- asx\par -followed by optho. Seen q 6mo. Last  1/21- told stable findings, no med changes made and to f/u in 6mo.\par -on gtt\par \par hx right neck/shoulder/ left upper back radiculopathy pain, hip pain, hx lumbar stenosis-\par -hx injection 11/15\par -hx followed for hip/lumbar stenosis at [facility]- seen 2/18, s/p home PT on own with improvement\par -followed by ortho, seen 4/21 with MRI c-spine/lumbar noted, advised PT for shoulder/LB. EMG ordered (pending 5/21). Has f/u 6/21.\par -followed by PMR, seen 5/21- noted hx gabapentin trial per ortho that stopped 2/2 drowsiness with use, declined Lyrica trial. Tylenol ES advised, noted EMG pending- advised f/u thereafter.\par -EMG pending 5/21\par -PT pending, states hesitant as done prior w/o help\par -on Tylenol prn\par -advised to avoid NSAIDs due to SE risk (bleeding, CKD)\par \par hx b/l hearing loss- stable\par -followed by ENT, seen f/u 2/21\par -hx using hearing aides, notes lost 1 in 12/20- in process of replacing as told can get refunded \par -advised to avoid qtips, use debrox with cotton prn\par \par hx left hand pain- dx'd L- de Quervain's and is s/p injection x 2 (last 10/18)- now resolved.  hx similar sx's on right s/p injection 3/21 with improvement\par -8/18 xray: STT joint OA \par -followed by hand ortho\par -on Tylenol prn\par \par hx skin rash- hives, unclear etiology, s/p bx by derm 9/17 c/w ?hypersensitivity- resolved \par -followed by derm, seen 2021 per pt with nl FBSE\par -followed by A/I - s/p negative patch testing, also found NOT allergic to PCN\par \par hx former tobacco use, hx abnl screening CT scan 9/19- asx\par -hx > 30 yr 1ppd, quit > 20 yrs \par -9/19 CT lung screening: Few less than 0.5 cm solid nodules are noted in the right middle lobe. \par Few patchy opacities are noted in both upper and right middle lobes. Follow-up CT scan is \par recommended in 3 months to ensure resolution. Lung RADS 2: Category benign appearance, continue yearly screening. (hx doing scan concurrent with URI sx's)\par -1/20 CT lung screening: (c/w 9/19) Near complete resolution of the opacities/consolidations in the right upper lobe and the right middle lobe when compared to previous exam.  Stable few small nodules in the right middle lobe when compared to previous exam.  Lungs RADS 2: benign, rec: annual screening.  Rx for repeat placed (pending) - encouraged, pt agreeable\par -Cont'd smoking cessation advised.\par -advised prompt medical eval if cough, fever, etc.\par \par \par MISC: Continued social distancing and measure for covid19 prevention encouraged. \par -hx covid vaccine (Moderna) series - finished 2/21\par \par \par HCM\par -hx CPE 1/21\par -1/21 cbc/bmp/TSH/B12/folate/vit d/PSA/UA unremarkable\par -hx flu shot 10/20 at pharmacy\par -hx PVX 4/11\par -hx prevnar 11/15\par -hx Tdap 7/14\par -hx shingles vaccine 7/20/11\par -hx shingrix series at pharmacy, finished 1/19\par -hx colonoscopy 6/19: colon polyps, rec: repeat in 5 yrs (due 2024) (Dr. Cano)\par -hx nl AAA screening US in 2018 by cardio per pt, declines repeat.  Asked to forward record.\par followed by derm, yearly screening advised. Regular use of sun block for skin cancer prevention counseled.\par -HCP: sonChip- has copy at home, asked to bring copy for chart\par \par Pt's cell: [phone]. \par \par Pt advised to f/u in 1 mo, unsure if will be virtual vs in office, aware I am now only at [place] practice and plans to see me there.\par \par

## 2021-05-11 ENCOUNTER — NON-APPOINTMENT (OUTPATIENT)
Age: 76
End: 2021-05-11

## 2021-06-17 ENCOUNTER — APPOINTMENT (OUTPATIENT)
Dept: ORTHOPEDIC SURGERY | Facility: CLINIC | Age: 76
End: 2021-06-17
Payer: MEDICARE

## 2021-06-17 VITALS
BODY MASS INDEX: 25.71 KG/M2 | WEIGHT: 160 LBS | DIASTOLIC BLOOD PRESSURE: 72 MMHG | HEART RATE: 79 BPM | OXYGEN SATURATION: 98 % | SYSTOLIC BLOOD PRESSURE: 122 MMHG | HEIGHT: 66 IN

## 2021-06-17 PROCEDURE — 95911 NRV CNDJ TEST 9-10 STUDIES: CPT

## 2021-06-17 PROCEDURE — 99072 ADDL SUPL MATRL&STAF TM PHE: CPT

## 2021-06-17 PROCEDURE — 95885 MUSC TST DONE W/NERV TST LIM: CPT

## 2021-06-17 PROCEDURE — 99213 OFFICE O/P EST LOW 20 MIN: CPT | Mod: 25

## 2021-06-20 NOTE — DISCUSSION/SUMMARY
[de-identified] : Ricky presents for NCS/EMG.\par - Studies demonstrate evidence of bilateral carpal tunnel syndrome, right > left.\par There is no electrophysiological evidence of right C6-7 cervical radiculopathy.\par - There is no electrophysiological evidence of ulnar neuropathy or polyneuropathy, although patient describes symptoms of ulnar neuropathy with prolonged elbow flexion.  I have advised him to avoid this position when driving or sleeping.\par -We discussed treatment options for carpal tunnel syndrome including injections, bracing, and surgery.  I have provided him with a wrist brace to be worn at night.  I have also recommended he follow-up in clinic for ultrasound-guided injection to the carpal tunnel.\par \par Adela Adams MD, EdM\par Sports Medicine [facility]

## 2021-06-20 NOTE — PHYSICAL EXAM
[de-identified] : Constitutional: Well-nourished, well-developed, No acute distress\par Respiratory:  Good respiratory effort, no SOB\par Lymphatic: No regional lymphadenopathy, no lymphedema\par Psychiatric: Pleasant and normal affect, alert and oriented x3\par Skin: Clean dry and intact B/L UE/LE\par Musculoskeletal: normal except where as noted in regional exam\par \par bilateral Hands:\par APPEARANCE: no marked deformities, no swelling or malalignment\par POSITIVE TENDERNESS: none\par ROM: full & painless in CMC, MCP, and IP joints. \par RESISTIVE TESTING: painless resisted testing. \par Vasc: 2+ radial pulse, normal capillary refill\par Neuro: decreased sensation palmar digits 1-3\par APB 4/5 bilaterally\par ADM/FDI 5/5\par wrist extensors and finger flexors 5/5 bilaterally\par + tinels at carpal tunnel and cubital tunnel\par AIN, PIN intact

## 2021-06-20 NOTE — CONSULT LETTER
[Dear  ___] : Dear  [unfilled], [Consult Letter:] : I had the pleasure of evaluating your patient, [unfilled]. [Consult Closing:] : Thank you very much for allowing me to participate in the care of this patient.  If you have any questions, please do not hesitate to contact me. [Sincerely,] : Sincerely, [FreeTextEntry1] : /Please see NCS/EMG procedure note dated 6/17/21 [FreeTextEntry3] : Adela Adams MD, EdM\par Sports Medicine [facility]\par Department of Orthopedics

## 2021-06-20 NOTE — HISTORY OF PRESENT ILLNESS
[de-identified] : Ricky is a 76M who presents with R arm pain.  Pain is present  in the medial arm and hand.  Pain is worse with activity and better with rest. He reports intermittent numbness.   It has been present for many years.  He is referred by Dr. Oneal.

## 2021-06-21 ENCOUNTER — APPOINTMENT (OUTPATIENT)
Dept: ORTHOPEDIC SURGERY | Facility: CLINIC | Age: 76
End: 2021-06-21
Payer: MEDICARE

## 2021-06-21 PROCEDURE — 99072 ADDL SUPL MATRL&STAF TM PHE: CPT

## 2021-06-21 PROCEDURE — 99213 OFFICE O/P EST LOW 20 MIN: CPT

## 2021-06-21 NOTE — HISTORY OF PRESENT ILLNESS
[de-identified] : Since her last visit the patient states that he is still having right hand paresthesias.  His neck stiffness/pain is somewhat improved as compared to notes from my last encounter.  His major symptom, however, is this right hand issue.  He also has some left hand numbness paresthesias but it is not as bad as his right.  He denies any issues with balance/falls, denies any severe radicular pain in his legs.  He has been walking 45 minutes a day and overall feels in good health except for his right upper extremity symptoms.  Denies bowel bladder issues.  Denies saddle anesthesia.\par \par \par 04/30/21\par This is a 75-year-old male that is been dealing with right shoulder, right hand paresthesias as well as left hip pain for a number of years.  Over the past several months the symptoms have acutely worsened.  In terms of his neck he feels stiffness between his shoulder blades.  Majority of his symptoms are centered over his right lateral shoulder and radiates into his right hand.  In particular he notes numbness and tingling in his index finger middle finger and ring finger.  He denies any issues with balance.  He denies any issues with dropping objects.\par \par The patient also has back pain as well as left hip pain.  The majority of his symptoms are in his left hip area.  Physical therapy has been tried for both his back and his neck but unfortunately he is still having substantial symptoms.  He denies any issues with range of motion of his left hip.  He denies any issues with weakness.  He denies any numbness or tingling.  He denies any bowel bladder issues or saddle anesthesia.\par \par The above history is from the patient's previous examination.\par \par Today the patient states that he still having significant neck pain and right arm pain.  He is taking gabapentin which does help him.  He does feel good in terms of his participation in physical therapy.  He denies any balance issues.  He denies any issues with him dexterity.  He denies any bowel bladder issues.  He denies any saddle anesthesia.  He is still having pain down his right lateral arm and into his forearm and hand.\par

## 2021-06-21 NOTE — PHYSICAL EXAM
[de-identified] : Cervical Physical Exam\par \par Gait -normal\par \par Station -normal\par \par Sagittal balance -normal\par \par Compensatory mechanism? -None\par \par Horizontal gaze -maintained\par \par Heel walk -normal\par \par Toe walk -normal\par \par Reflexes\par Biceps -hyperreflexic\par Triceps -hyperreflexic\par Brachioradialis -hyperreflexic\par Patellar -hyperreflexic\par Gastroc -hyperreflexic\par Clonus -no\par \par Trouble with tandem gait\par \par Ortiz´s -none\par \par Shoulder Exam -shoulder range of motion is improved as compared to previous examination\par \par Spurling´s -positive\par \par Wrist Pulses -2+ radial/ulnar\par \par Foot Pulses -2+ DP/PT\par \par Cervical range of motion -globally reduced\par \par Sensation \par C5-T1 sensation intact to light touch bilaterally\par \par L1-S1 sensation intact to light touch bilaterally\par \par Motor\par \par \par 	Deltoid	Biceps	Triceps	WF	WE	IO	\par Right	5/5	5/5	5/5	5/5	5/5	5/5	5/5\par Left	5/5	5/5	5/5	5/5	5/5	5/5	5/5\par \par \par 	IP	Quad	HS	TA	Gastroc	EHL\par Right	5/5	5/5	5/5	5/5	5/5	5/5\par Left	5/5	5/5	5/5	5/5	5/5	5/5\par \par Strength improved as compared to prior examination

## 2021-06-21 NOTE — ASSESSMENT
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  At this point I advised him to focus on treating his right carpal tunnel syndrome.  Treatment for the neck for this amount of stenosis would have less predictability as compared to treatment of his right carpal tunnel syndrome.  Therefore he should focus on getting this treated first.  If he is still having symptoms I am happy to see him and talk through different treatment modalities.  He was in agreement with this plan.  He can continue his home exercise program.  I will have him follow-up in 3 months for repeat clinical evaluation and I encouraged him to follow-up sooner if he has any new or worsening symptoms.

## 2021-07-07 ENCOUNTER — APPOINTMENT (OUTPATIENT)
Dept: ORTHOPEDIC SURGERY | Facility: CLINIC | Age: 76
End: 2021-07-07
Payer: MEDICARE

## 2021-07-07 VITALS
OXYGEN SATURATION: 96 % | DIASTOLIC BLOOD PRESSURE: 75 MMHG | HEART RATE: 74 BPM | WEIGHT: 160 LBS | HEIGHT: 66 IN | BODY MASS INDEX: 25.71 KG/M2 | SYSTOLIC BLOOD PRESSURE: 131 MMHG

## 2021-07-07 DIAGNOSIS — R20.2 ANESTHESIA OF SKIN: ICD-10-CM

## 2021-07-07 DIAGNOSIS — R20.0 ANESTHESIA OF SKIN: ICD-10-CM

## 2021-07-07 PROCEDURE — 99214 OFFICE O/P EST MOD 30 MIN: CPT

## 2021-07-07 PROCEDURE — 99072 ADDL SUPL MATRL&STAF TM PHE: CPT

## 2021-07-12 ENCOUNTER — APPOINTMENT (OUTPATIENT)
Dept: INTERNAL MEDICINE | Facility: CLINIC | Age: 76
End: 2021-07-12
Payer: MEDICARE

## 2021-07-12 ENCOUNTER — APPOINTMENT (OUTPATIENT)
Dept: UROLOGY | Facility: CLINIC | Age: 76
End: 2021-07-12
Payer: MEDICARE

## 2021-07-12 DIAGNOSIS — M65.4 RADIAL STYLOID TENOSYNOVITIS [DE QUERVAIN]: ICD-10-CM

## 2021-07-12 PROCEDURE — 99212 OFFICE O/P EST SF 10 MIN: CPT | Mod: 95

## 2021-07-12 PROCEDURE — 99072 ADDL SUPL MATRL&STAF TM PHE: CPT

## 2021-07-12 PROCEDURE — 99214 OFFICE O/P EST MOD 30 MIN: CPT

## 2021-07-12 NOTE — ADDENDUM
[FreeTextEntry1] : Entered by [place], acting as scribe for Dr. Andrzej Ortiz.\par \par The documentation recorded by the scribe accurately reflects the service I personally performed and the decisions made by me.

## 2021-07-12 NOTE — HISTORY OF PRESENT ILLNESS
[FreeTextEntry1] : Please refer to [facility] Consult note \par \par fu bph \par psa stable\par progressive sym \par start Flomax\par pvr

## 2021-07-12 NOTE — HISTORY OF PRESENT ILLNESS
[Home] : at home, [unfilled] , at the time of the visit. [Medical Office: ([facility])___] : at the medical office located in  [Verbal consent obtained from patient] : the patient, [unfilled] [FreeTextEntry1] : \par f/u [de-identified] : \par As this is a telemedicine visit, no physical exam could be performed.  Diagnosis and treatment is based on symptoms provided.\par \par \par 75 yo M pmhx HTN, HLD, CAD s/p stents, preDM, anxiety/depression, FL, glaucoma, BPH, lung nodules for f/u\par \par Feeling well.\par Needs med refill.\par \par \par Reports is socially distancing and using precautions for covid prevention.\par Denies sick or covid positive contacts.\par Denies fever, chills, cough or sob.\par -hx covid vaccine (Moderna) series - finished 2/14/21\par \par \par hx anxiety/depression- reports stable anxiety- using lorazepam 1-2x/wk.  Notes mildly low mood on/off- but improving, denies HI/SI or panic attacks\par -sleeping and eating well\par -notes son is currently stable, but still struggling with ETOH abuse.  Is living alone in [facility]- overall going okay. Remain in close contact. Remains out of rehab/hospital. Remain on good speaking terms. Feels safe.\par -reports has good social support from friends and neighbors and remains connected despite limitations posed by covid pandemic, all close contacts reportedly received covid vaccines as well\par -does not feel needs therapist \par -notes mild anxiety 2/2 covid 19 pandemic on/off. \par \par hx CAD-\par -hx abnl routine stress test 9/20 and is s/p cardiac cath 10/20- noted high grade mid LAD lesion with stent placed, noted patent RCA and OM stents, restarted on Plavix.  \par -followed by cardio and on med regimen.  States last seen 5/21 with 4/21 labs reviewed, no changes made.  Planned to follow-up in the fall.\par -denies clinical bleeding\par \par Exercising: walks daily ~ 40 mins- reports feels well with doing\par Denies cough, CP, sob, dizziness or GI complaints. \par \par hx right neck pain/numbness with radiation to right shoulder pain and occasional hand tingling, back pain- states dx'd with CTS and had injection on right in 6/21 with improvement since.\par -followed by ortho, seen 4/21 with MRI c-spine/lumbar noted.  EMG done 5/21 c/w CTS (R>L), is s/p right njection in 6/21 with improved sx's.\par -followed by PMR, seen 5/21- noted hx gabapentin trial that stopped 2/2 drowsiness with use, declined Lyrica trial.  Tylenol ES advised, noted EMG pending- advised f/u thereafter.\par -PT pending, states hesitant as done prior w/o help\par -on Tylenol PM qhs, taking 2-3x/wk.  Not taking OTC NSAIDs.\par \par \par No new  issues, chronic stable frequency.  Has  f/u today.\par  \par

## 2021-07-12 NOTE — ASSESSMENT
[FreeTextEntry1] : \par 77 yo M pmhx HTN, HLD, CAD s/p stents, preDM, anxiety/depression, FL, glaucoma, BPH, lung nodules for f/u\par \par anxiety/depression- stable mood, denies acute sx's\par -on lorazepam prn, refilled (iSTOP: [id number] )\par -declines  referral\par -advised to f/u if sx's worsen\par \par CAD hx stents (last 10/20), HTN, HLD- 4/21 (by cardio) Tchol 126  (was 177) LDL 65 HDL 40; LFTs wnl\par -hx self d/c'd Lovaza 1/14 due to cost (no hx adverse rxn)\par -hx cardiac cath 10/20- noted high grade mid LAD lesion with stent placed, noted patent RCA and OM stents. \par -off pravastatin 2/2 diarrhea with use\par -restarted Crestor since 11/19, tolerating w/o SEs\par -on ASA, Plavix and ramipril\par -to consider OTC fish oil, to discuss with cardio\par -followed by cardio (Dr. Alcocer, [facility]) - states last seen 5/21 with 4/21 labs reviewed, no changes made.  Planned to follow-up in the fall.\par -1/21 bmp/TSH, screening UA/prt:crt unremarkable\par -4/21 cmp wnl\par -asked to forward med records/labs to office\par \par preDM- 1/21 A1c 5.7 (was 5.5)\par -ADA diet and exercise counseled\par \par hx microscopic hematuria, frequency- stable, presumed BPH\par -off myrbetriq 2/2 constipation\par -followed by , Dr. Ortiz- seen 4/19 when told likely 2/2 BPH and advised to cont oxybutynin.  Has f/u today.\par -self d/c'd oxybutynin since  visit as did not like taking it- declines medication use at this time\par -1/21 bmp/PSA/UA/Ucx unremarkable\par -advised to f/u if sx's worsen, dysuria, hesitancy, etc.\par \par hx fatty liver, colon polyps- asx\par -6/19 screening colonoscopy: +polyps, rec: repeat in 5 yrs\par -9/19 US abdomen: fatty liver, planned to repeat in 1 yr\par -followed by GI, Dr. Cano\par -4/21 LFTs wnl\par -low fat diet, exercise advised\par \par hx glaucoma, detached retina repair- asx\par -followed by optho. Seen q 6mo. Last  1/21- told stable findings, no med changes made and to f/u in 6mo.\par -on gtt\par \par hx right neck/shoulder/ left upper back radiculopathy pain, hip pain, hx lumbar stenosis, b/l CTS- better\par -hx injection 11/15\par -hx followed for hip/lumbar stenosis at [facility] for [facility] surgery- seen 2/18, s/p home PT on own with improvement\par -followed by ortho, seen 4/21 with MRI c-spine/lumbar noted. EMG done 5/21 c/w CTS (R>L), is s/p right injection in 6/21 with improved sx's.\par -followed by PMR, seen 5/21- noted hx gabapentin trial per ortho that stopped 2/2 drowsiness with use, declined Lyrica trial. Tylenol ES advised, noted EMG pending- advised f/u thereafter.\par -PT pending, states hesitant as done prior w/o help\par -on Tylenol prn\par -advised to avoid NSAIDs due to SE risk (bleeding, CKD)\par \par hx b/l hearing loss- stable\par -followed by ENT, seen f/u 2/21\par -hx using hearing aides, notes lost 1 in 12/20- in process of replacing as told can get refunded \par -advised to avoid qtips, use debrox with cotton prn\par \par hx left hand pain- dx'd L- de Quervain's and is s/p injection x 2 (last 10/18)- now resolved.  hx similar sx's on right s/p injection 3/21 with improvement\par -8/18 xray: STT joint OA \par -followed by hand ortho\par -on Tylenol prn\par \par hx skin rash- hives, unclear etiology, s/p bx by derm 9/17 c/w ?hypersensitivity- resolved \par -followed by derm, seen 2021 per pt with nl FBSE\par -followed by A/I - s/p negative patch testing, also found NOT allergic to PCN\par \par hx former tobacco use, hx abnl screening CT scan 9/19- asx\par -hx > 30 yr 1ppd, quit > 20 yrs \par -9/19 CT lung screening: Few less than 0.5 cm solid nodules are noted in the right middle lobe. \par Few patchy opacities are noted in both upper and right middle lobes. Follow-up CT scan is \par recommended in 3 months to ensure resolution. Lung RADS 2: Category benign appearance, continue yearly screening. (hx doing scan concurrent with URI sx's)\par -1/20 CT lung screening: (c/w 9/19) Near complete resolution of the opacities/consolidations in the right upper lobe and the right middle lobe when compared to previous exam.  Stable few small nodules in the right middle lobe when compared to previous exam.  Lungs RADS 2: benign, rec: annual screening.  Rx for repeat placed (pending) - hesitant as not sure if wants to have done.  Risks/benefits discussed.\par -Cont'd smoking cessation advised.\par -advised prompt medical eval if cough, fever, etc.\par \par \par MISC: Continued social distancing and measure for covid19 prevention encouraged. \par -hx covid vaccine (Moderna) series - finished 2/21\par \par \par HCM\par -hx CPE 1/21\par -1/21 cbc/bmp/TSH/B12/folate/vit d/PSA/UA unremarkable\par -hx flu shot 10/20 at pharmacy\par -hx PVX 4/11\par -hx prevnar 11/15\par -hx Tdap 7/14\par -hx shingles vaccine 7/20/11\par -hx shingrix series at pharmacy, finished 1/19\par -hx colonoscopy 6/19: colon polyps, rec: repeat in 5 yrs (due 2024) (Dr. Cano)\par -hx nl AAA screening US in 2018 by cardio per pt, declines repeat.  Asked to forward record.\par followed by derm, yearly screening advised. Regular use of sun block for skin cancer prevention counseled.\par -HCP: sonChip- has copy at home, asked to bring copy for chart\par \par Pt's cell: [phone]. \par \par Pt plans to f/u 2 mo, encouraged to be seen in office at which times labs may be done.  Pt aware I am now only at [place] practice and plans to see me there.\par \par

## 2021-07-12 NOTE — LETTER BODY
[FreeTextEntry1] : Martha Ulloa MD\par [address], \[place] [address]\par [phone]\par \par Dear Dr. Ulloa,\par \par Reason for Visit: BPH.\par \par This is a 76 year old gentleman with symptoms of BPH. Patient returns today for follow up. Since he was last seen the patient reports previously taking Myrbetriq and Oxybutynin. He has stopped medical therapy since there was minimal improvement.  He reports progressive symptoms of weak uroflow, frequency, and hesitancy. He denies any hematuria or urinary incontinence. He reports no pain. All other review of systems are negative. His social and family history remain unchanged.  Past medical history, family history and social history were inquired and were noncontributory to current condition. Medications and allergies were reviewed. He has no known allergies to medication. \par \par On examination, the patient is a elderly-appearing gentleman in no acute distress. He is alert and oriented follows commands. He has normal mood and affect. He is normocephalic. Neck is supple. Oral no thrush Respirations are unlabored. His abdomen is soft and nontender. Bladder is nonpalpable. No CVA tenderness. Neurologically he is grossly intact. No peripheral edema. Skin without gross abnormality. He has normal male external genitalia. Normal meatus. Bilateral testes are descended intrascrotally and normal to palpation. On rectal examination, there is normal sphincter tone. The prostate is clinically benign without focal induration or nodularity.\par \par His BMP from January 2021 demonstrated normal renal functions, creatinine 1.01. His PSA was 2.03, which is within normal limits. His urinalysis was unremarkable. \par \par ASSESSMENT: BPH\par \par I counseled the patient. Given his progressive urinary symptoms, I recommended he begin a trial of Flomax. I discussed the potential side effects of the medication. I counseled the patient on its use and side effects. If the patient develops any side effects, the patient will discontinue the medication and contact me.  The patient will obtain BMP, PSA and urinalysis to ensure stability. Risks and alternatives were discussed. I answered the patient questions. The patient will follow-up as directed and will contact me with any questions or concerns. Thank you for the opportunity to participate in the care of Mr. SOTELO. I will keep you updated on his progress.\par \par Plan: Trial of Flomax. BMP. PSA. Urinalysis. Follow up as directed. \par

## 2021-07-12 NOTE — DATA REVIEWED
[FreeTextEntry1] : 4/6/21 outside labs received (in chart)\par \par cmp wnl\par Tchol 126\par  (was 177 in 1/21)\par LDL 65 \par HDL 40\par

## 2021-07-15 PROBLEM — R20.0 NUMBNESS AND TINGLING OF RIGHT ARM: Status: ACTIVE | Noted: 2021-03-02

## 2021-08-04 ENCOUNTER — RX CHANGE (OUTPATIENT)
Age: 76
End: 2021-08-04

## 2021-08-23 ENCOUNTER — APPOINTMENT (OUTPATIENT)
Dept: UROLOGY | Facility: CLINIC | Age: 76
End: 2021-08-23
Payer: MEDICARE

## 2021-08-23 PROCEDURE — 99213 OFFICE O/P EST LOW 20 MIN: CPT

## 2021-08-31 NOTE — ADDENDUM
[FreeTextEntry1] : Entered by [place], acting as scribe for Dr. Andrzej Ortiz.\par \par The documentation recorded by the scribe accurately reflects the service I personally performed and the decisions made by me.  [facility] (formerly [facility])

## 2021-08-31 NOTE — LETTER BODY
[FreeTextEntry1] : Martha Ulloa MD\par [address], \[place] [address]\par [phone]\par \par Dear Dr. Ulloa,\par \par Reason for Visit: BPH.\par \par This is a 76 year old gentleman with symptoms of BPH. Patient returns today for follow up. He was previously taking Myrbetriq and Oxybutynin which were both ineffective.  He returns today for follow up. Since he was last seen, the patient has been taking Flomax regularly as directed. He  He reports improved urinary symptoms on medical therapy. He denies any hematuria or urinary incontinence. He reports no pain. All other review of systems are negative. His social and family history remain unchanged. Past medical history, family history and social history were inquired and were noncontributory to current condition. Medications and allergies were reviewed. He has no known allergies to medication. \par \par On examination, the patient is a elderly-appearing gentleman in no acute distress. He is alert and oriented follows commands. He has normal mood and affect. He is normocephalic. Neck is supple. Oral no thrush Respirations are unlabored. His abdomen is soft and nontender. Bladder is nonpalpable. No CVA tenderness. Neurologically he is grossly intact. No peripheral edema. Skin without gross abnormality. He has normal male external genitalia. Normal meatus. Bilateral testes are descended intrascrotally and normal to palpation. On rectal examination, there is normal sphincter tone. The prostate is clinically benign without focal induration or nodularity.\par \par His BMP from January 2021 demonstrated normal renal functions, creatinine 1.01. His PSA was 2.03, which is within normal limits. His urinalysis was unremarkable. \par \par ASSESSMENT: BPH. \par \par I counseled the patient. He notes improved urinary symptoms on medical therapy. I recommended he continue taking Flomax. I renewed the patient's prescription for Flomax today. I encouraged the patient to continue medications regularly as directed.   Risks and alternatives were discussed. I answered the patient questions. The patient will follow-up as directed and will contact me with any questions or concerns. Thank you for the opportunity to participate in the care of Mr. SOTELO. I will keep you updated on his progress.\par \par Plan: Continue Flomax.  Follow up as directed.

## 2021-09-13 ENCOUNTER — APPOINTMENT (OUTPATIENT)
Dept: INTERNAL MEDICINE | Facility: CLINIC | Age: 76
End: 2021-09-13
Payer: MEDICARE

## 2021-09-13 VITALS
TEMPERATURE: 98 F | SYSTOLIC BLOOD PRESSURE: 110 MMHG | HEART RATE: 75 BPM | WEIGHT: 160 LBS | RESPIRATION RATE: 16 BRPM | BODY MASS INDEX: 25.71 KG/M2 | HEIGHT: 66 IN | DIASTOLIC BLOOD PRESSURE: 64 MMHG | OXYGEN SATURATION: 98 %

## 2021-09-13 PROCEDURE — G0008: CPT

## 2021-09-13 PROCEDURE — 99213 OFFICE O/P EST LOW 20 MIN: CPT | Mod: 25

## 2021-09-13 PROCEDURE — 36415 COLL VENOUS BLD VENIPUNCTURE: CPT

## 2021-09-13 PROCEDURE — 90662 IIV NO PRSV INCREASED AG IM: CPT

## 2021-09-13 NOTE — PHYSICAL EXAM
[General Appearance - Alert] : alert [General Appearance - In No Acute Distress] : in no acute distress [General Appearance - Well-Appearing] : healthy appearing [Sclera] : the sclera and conjunctiva were normal [PERRL With Normal Accommodation] : pupils were equal in size, round, and reactive to light [Extraocular Movements] : extraocular movements were intact [Outer Ear] : the ears and nose were normal in appearance [Nasal Cavity] : the nasal mucosa and septum were normal [Oropharynx] : the oropharynx was normal [Neck Appearance] : the appearance of the neck was normal [Thyroid Diffuse Enlargement] : the thyroid was not enlarged [Respiration, Rhythm And Depth] : normal respiratory rhythm and effort [Auscultation Breath Sounds / Voice Sounds] : lungs were clear to auscultation bilaterally [Heart Rate And Rhythm] : heart rate was normal and rhythm regular [Heart Sounds] : normal S1 and S2 [Murmurs] : no murmurs [Abdominal Aorta] : the abdominal aorta was normal [Full Pulse] : the pedal pulses are present [Edema] : there was no peripheral edema [Bowel Sounds] : normal bowel sounds [Abdomen Soft] : soft [Abdomen Tenderness] : non-tender [Cervical Lymph Nodes Enlarged Posterior Bilaterally] : posterior cervical [Cervical Lymph Nodes Enlarged Anterior Bilaterally] : anterior cervical [Supraclavicular Lymph Nodes Enlarged Bilaterally] : supraclavicular [No CVA Tenderness] : no ~M costovertebral angle tenderness [No Spinal Tenderness] : no spinal tenderness [Abnormal Walk] : normal gait [Musculoskeletal - Swelling] : no joint swelling seen [No Focal Deficits] : no focal deficits [Oriented To Time, Place, And Person] : oriented to person, place, and time [Affect] : the affect was normal [Mood] : the mood was normal [] : no hepato-splenomegaly [FreeTextEntry1] : scattered freckles

## 2021-09-13 NOTE — ASSESSMENT
[FreeTextEntry1] : \par 75 yo M pmhx HTN, HLD, CAD s/p stents, preDM, anxiety/depression, FL, glaucoma, BPH, lung nodules for f/u\par \par \par anxiety/depression- stable mood, denies acute sx's\par -on lorazepam prn, refilled (iSTOP [id number] )\par -declines  referral\par -advised to f/u if sx's worsen\par \par CAD hx stents (last 10/20), HTN, HLD- 4/21 (by cardio) Tchol 126  (was 177) LDL 65 HDL 40; LFTs wnl\par -hx self d/c'd Lovaza 1/14 due to cost (no hx adverse rxn)\par -hx cardiac cath 10/20- noted high grade mid LAD lesion with stent placed, noted patent RCA and OM stents. \par -off pravastatin 2/2 diarrhea with use\par -restarted Crestor since 11/19, tolerating w/o SEs\par -on ASA, Plavix and ramipril\par -to consider OTC fish oil, to discuss with cardio\par -followed by cardio (Dr. Alcocer, [facility]) - states last seen 5/21 with 4/21 labs reviewed, no changes made.  Has f/u 10/21 with fasting labs planned prior -defers fasting lipids to then\par -1/21 bmp/TSH, screening UA/prt:crt unremarkable\par -4/21 cmp wnl\par -asked to forward med records/labs to office\par -check bmp\par \par preDM- 1/21 A1c 5.7 (was 5.5)\par -ADA diet and exercise counseled\par -check A1c\par \par hx microscopic hematuria, BPH-\par -off myrbetriq 2/2 constipation\par -self d/c'd oxybutynin since  visit as did not like taking it- declines medication use at this time\par -1/21 bmp/PSA/UA/Ucx unremarkable\par -on Flomax with improved sx's reported\par -followed by , seen 8/21 w/o changes made. Has f/u 2/22.\par -advised to f/u if sx's worsen, dysuria, hesitancy, etc.\par \par hx fatty liver, colon polyps- asx\par -6/19 screening colonoscopy: +polyps, rec: repeat in 5 yrs\par -9/19 US abdomen: fatty liver, planned to repeat in 1 yr - pt declines repeat imaging.\par -followed by GI, Dr. Cano - f/u pending\par -4/21 LFTs wnl\par -low fat diet, exercise advised\par \par hx glaucoma, detached retina repair- asx\par -followed by optho. Seen q 6mo. Last  1/21- told stable findings, no med changes made and to f/u in 6 mo -pending\par -on gtt\par \par hx right neck/shoulder/ left upper back radiculopathy pain, hip pain, hx lumbar stenosis, b/l CTS- better\par -hx injection 11/15\par -hx followed for hip/lumbar stenosis at [facility] for special surgery- seen 2/18, s/p home PT on own with improvement\par -followed by ortho, seen 4/21 with MRI c-spine/lumbar noted. EMG done 5/21 c/w CTS (R>L), is s/p right injection in 6/21 with improved sx's.\par -followed by PMR, seen 5/21- noted hx gabapentin trial per ortho that stopped 2/2 drowsiness with use, declined Lyrica trial. Tylenol ES advised, noted EMG pending- advised f/u thereafter.\par -PT pending, states hesitant as done prior w/o help\par -on Tylenol prn\par -advised to avoid NSAIDs due to SE risk (bleeding, CKD)\par \par hx b/l hearing loss- stable\par -followed by ENT, seen f/u 2/21\par -hx using hearing aides, notes lost 1 in 12/20- in process of replacing as told can get refunded \par -advised to avoid qtips, use debrox with cotton prn\par \par hx left hand pain- dx'd L- de Quervain's and is s/p injection x 2 (last 10/18)- now resolved.  hx similar sx's on right s/p injection 3/21 with improvement\par -8/18 xray: STT joint OA \par -followed by hand ortho\par -on Tylenol prn\par \par hx skin rash- hives, unclear etiology, s/p bx by derm 9/17 c/w ?hypersensitivity- resolved \par -followed by derm, seen 2021 per pt with nl FBSE\par -followed by A/I - s/p negative patch testing, also found NOT allergic to PCN\par \par hx former tobacco use, hx abnl screening CT scan 9/19- asx\par -hx > 30 yr 1ppd, quit > 20 yrs \par -9/19 CT lung screening: Few less than 0.5 cm solid nodules are noted in the right middle lobe. \par Few patchy opacities are noted in both upper and right middle lobes. Follow-up CT scan is \par recommended in 3 months to ensure resolution. Lung RADS 2: Category benign appearance, continue yearly screening. (hx doing scan concurrent with URI sx's)\par -1/20 CT lung screening: (c/w 9/19) Near complete resolution of the opacities/consolidations in the right upper lobe and the right middle lobe when compared to previous exam.  Stable few small nodules in the right middle lobe when compared to previous exam.  Lungs RADS 2: benign, rec: annual screening.  Rx for repeat placed (pending)- no declines.  States does not wish to have done and pursue surveillance for lung cancer.  Risks/benefits discussed.\par -Cont'd smoking cessation advised.\par -advised prompt medical eval if cough, fever, etc.\par \par \par MISC: Continued social distancing and measure for covid19 prevention encouraged. \par -hx covid vaccine (Moderna) series - 1/12/21, 2/14/21 and booster 8/17/21\par \par \par HCM\par -hx CPE 1/21, yearly advised\par -1/21 cbc/bmp/TSH/B12/folate/vit d/PSA/UA unremarkable\par -flu shot today\par -hx PVX 4/11\par -hx prevnar 11/15\par -hx Tdap 7/14\par -hx shingles vaccine 7/20/11\par -hx shingrix series at pharmacy, finished 1/19\par -hx colonoscopy 6/19: colon polyps, rec: repeat in 5 yrs (due 2024) (Dr. Cano)\par -hx nl AAA screening US in 2018 by cardio per pt, declines repeat.  Asked to forward record.\par followed by derm, yearly screening advised. Regular use of sun block for skin cancer prevention counseled.\par -HCP: sonChip- has copy at home, asked to bring copy for chart\par \par Pt's cell: [phone]\par \par Labs drawn in office today.\par \par

## 2021-09-13 NOTE — HISTORY OF PRESENT ILLNESS
[FreeTextEntry1] : \par f/u [de-identified] : \par 77 yo M pmhx HTN, HLD, CAD s/p stents, preDM, anxiety/depression, FL, glaucoma, BPH, lung nodules for f/u\par \par Last encounter 7/12/21 via virtual visit for f/u and med refill.\par \par Feeling well today.\par Needs med refill.\par Not fasting.\par \par \par Reports is socially distancing and using precautions for covid prevention.\par Denies sick or covid positive contacts.\par Denies fever, chills, cough or sob.\par -hx covid vaccine (Moderna) series - 1/12/21, 2/14/21 and booster 8/17/21\par \par \par hx anxiety/depression- reports stable anxiety- using lorazepam 1-2x/wk.  Notes mildly low mood on/off- but improving, denies HI/SI or panic attacks\par -sleeping and eating well\par -notes son is currently stable, but still struggling with ETOH abuse.  Is living alone in [facility]- overall going okay. Remain in close contact. Remains out of rehab/hospital. Remain on good speaking terms. Feels safe.\par -reports has good social support from friends and neighbors and remains connected despite limitations posed by covid pandemic, all close contacts reportedly received covid vaccines as well\par -does not feel needs therapist \par -notes mild anxiety 2/2 covid 19 pandemic on/off. \par \par hx CAD-\par -hx abnl routine stress test 9/20 and is s/p cardiac cath 10/20- noted high grade mid LAD lesion with stent placed, noted patent RCA and OM stents, restarted on Plavix.  \par -followed by cardio and on med regimen.  States last seen 5/21 with 4/21 labs reviewed, no changes made.  Has f/u 10/21 with fasting labs planned prior.\par -denies clinical bleeding\par \par Exercising: walks daily ~ 40 mins- reports feels well with doing\par Denies cough, CP, sob, dizziness or GI complaints. \par \par hx right neck pain/numbness with radiation to right shoulder pain and occasional hand tingling, back pain- states dx'd with CTS and had injection on right in 6/21 with improvement since.\par -followed by ortho, seen 4/21 with MRI c-spine/lumbar noted.  EMG done 5/21 c/w CTS (R>L), is s/p right injection in 6/21 with improved sx's.\par -followed by PMR, seen 5/21- noted hx gabapentin trial that stopped 2/2 drowsiness with use, declined Lyrica trial.  Tylenol ES advised, noted EMG pending- advised f/u thereafter.\par -PT pending, states hesitant as done prior w/o help\par -on Tylenol PM qhs, taking 2-3x/wk.  Not taking OTC NSAIDs.\par \par BPH-\par -on Flomax with improved sx's reported\par -followed by , seen 8/21 w/o changes made. Has f/u 2/22.\par  \par

## 2021-09-13 NOTE — REVIEW OF SYSTEMS
[Negative] : Integumentary [see HPI] : see HPI [Dysuria] : no dysuria [Limb Weakness] : no limb weakness [Suicidal] : not suicidal [Depression] : no depression

## 2021-09-15 LAB
ALBUMIN SERPL ELPH-MCNC: 4.1 G/DL
ALP BLD-CCNC: 71 U/L
ALT SERPL-CCNC: 15 U/L
ANION GAP SERPL CALC-SCNC: 15 MMOL/L
AST SERPL-CCNC: 18 U/L
BILIRUB DIRECT SERPL-MCNC: 0.1 MG/DL
BILIRUB INDIRECT SERPL-MCNC: 0.3 MG/DL
BILIRUB SERPL-MCNC: 0.4 MG/DL
BUN SERPL-MCNC: 14 MG/DL
CALCIUM SERPL-MCNC: 8.9 MG/DL
CHLORIDE SERPL-SCNC: 101 MMOL/L
CO2 SERPL-SCNC: 26 MMOL/L
CREAT SERPL-MCNC: 0.89 MG/DL
ESTIMATED AVERAGE GLUCOSE: 117 MG/DL
GLUCOSE SERPL-MCNC: 115 MG/DL
HBA1C MFR BLD HPLC: 5.7 %
POTASSIUM SERPL-SCNC: 4.1 MMOL/L
PROT SERPL-MCNC: 6.6 G/DL
SODIUM SERPL-SCNC: 141 MMOL/L

## 2021-10-18 ENCOUNTER — APPOINTMENT (OUTPATIENT)
Dept: ORTHOPEDIC SURGERY | Facility: CLINIC | Age: 76
End: 2021-10-18
Payer: MEDICARE

## 2021-10-18 DIAGNOSIS — M75.21 BICIPITAL TENDINITIS, RIGHT SHOULDER: ICD-10-CM

## 2021-10-18 PROCEDURE — 99214 OFFICE O/P EST MOD 30 MIN: CPT | Mod: 25

## 2021-10-18 PROCEDURE — 20551 NJX 1 TENDON ORIGIN/INSJ: CPT | Mod: RT

## 2021-10-23 PROBLEM — M75.21 BICEPS TENDINITIS OF RIGHT UPPER EXTREMITY: Status: ACTIVE | Noted: 2021-10-23

## 2021-11-09 ENCOUNTER — APPOINTMENT (OUTPATIENT)
Dept: GASTROENTEROLOGY | Facility: CLINIC | Age: 76
End: 2021-11-09
Payer: MEDICARE

## 2021-11-09 VITALS — HEIGHT: 66 IN | BODY MASS INDEX: 25.71 KG/M2 | WEIGHT: 160 LBS

## 2021-11-09 DIAGNOSIS — D12.6 BENIGN NEOPLASM OF COLON, UNSPECIFIED: ICD-10-CM

## 2021-11-09 PROCEDURE — 99214 OFFICE O/P EST MOD 30 MIN: CPT | Mod: 95

## 2021-11-16 ENCOUNTER — APPOINTMENT (OUTPATIENT)
Dept: INTERNAL MEDICINE | Facility: CLINIC | Age: 76
End: 2021-11-16
Payer: MEDICARE

## 2021-11-16 PROCEDURE — 99212 OFFICE O/P EST SF 10 MIN: CPT | Mod: 95

## 2021-11-16 RX ORDER — CLOPIDOGREL BISULFATE 75 MG/1
75 TABLET, FILM COATED ORAL
Qty: 90 | Refills: 0 | Status: DISCONTINUED | COMMUNITY
Start: 2020-10-14 | End: 2021-11-16

## 2021-11-16 NOTE — ASSESSMENT
[FreeTextEntry1] : \par 75 yo M pmhx HTN, HLD, CAD s/p stents, preDM, anxiety/depression, FL, glaucoma, BPH, lung nodules for f/u\par \par \par anxiety/depression- stable mood, denies acute sx's\par -on lorazepam prn, refilled (iSTOP checked)\par -declines  referral\par -advised to f/u if sx's worsen\par \par CAD hx stents (last 10/20), HTN, HLD- 4/21 (by cardio) Tchol 126  (was 177) LDL 65 HDL 40; LFTs wnl\par -hx self d/c'd Lovaza 1/14 due to cost (no hx adverse rxn)\par -hx cardiac cath 10/20- noted high grade mid LAD lesion with stent placed, noted patent RCA and OM stents. \par -off pravastatin 2/2 diarrhea with use\par -restarted Crestor since 11/19, tolerating w/o SEs\par -on ASA, Plavix and ramipril\par -to consider OTC fish oil, to discuss with cardio\par -followed by cardio (Dr. Alcocer, [facility]) - States last seen 10/21 with labs done prior to visit reviewed, Plavix d/c'd as told taken x 1 yr and no longer needed.  To f/u in 6mo. \par -1/21 bmp/TSH, screening UA/prt:crt unremarkable\par -10/21 cmp wnl\par -asked to forward med records/labs to office\par \par preDM- 9/21 A1c 5.7 (was 5.5)\par -ADA diet and exercise counseled\par \par hx microscopic hematuria, BPH-\par -off myrbetriq 2/2 constipation\par -self d/c'd oxybutynin since  visit as did not like taking it- declines medication use at this time\par -1/21 bmp/PSA/UA/Ucx unremarkable\par -on Flomax with improved sx's reported\par -followed by , seen 8/21 w/o changes made. Has f/u 2/22.\par -advised to f/u if sx's worsen, dysuria, hesitancy, etc.\par \par hx fatty liver, colon polyps- asx\par -6/19 screening colonoscopy: +polyps, rec: repeat in 5 yrs\par -9/19 US abdomen: fatty liver, planned to repeat in 1 yr - pt declines repeat imaging.\par -followed by GI, Dr. Cano - had virtual visit 11/21, told c-scope due as advised prior 5 yrs from last in 6/19\par -10/21 LFTs wnl\par -low fat diet, exercise advised\par \par hx glaucoma, detached retina repair- asx\par -followed by optho. Seen q 6mo. Last  1/21- told stable findings, no med changes made and to f/u in 6 mo -pending\par -on gtt\par \par hx right neck/shoulder/ left upper back radiculopathy pain, hip pain, hx lumbar stenosis, b/l CTS- better\par -hx injection 11/15\par -hx followed for hip/lumbar stenosis at [facility] for special surgery- seen 2/18, s/p home PT on own with improvement\par -followed by ortho, seen 10/21 with shoulder injected\par -followed by PMR, seen 5/21- noted hx gabapentin trial per ortho that stopped 2/2 drowsiness with use, declined Lyrica trial. Tylenol ES advised, noted EMG pending- advised f/u thereafter.\par -PT pending, states hesitant as done prior w/o help\par -on Tylenol prn\par \par hx b/l hearing loss- stable\par -followed by ENT, seen f/u 2/21\par -hx using hearing aides, notes lost 1 in 12/20- in process of replacing as told can get refunded \par -advised to avoid qtips, use debrox with cotton prn\par \par hx left hand pain- dx'd L- de Quervain's and is s/p injection x 2 (last 10/18)- now resolved.  hx similar sx's on right s/p injection 3/21 with improvement\par -8/18 xray: STT joint OA \par -followed by hand ortho\par -on Tylenol prn\par \par hx skin rash- hives, unclear etiology, s/p bx by derm 9/17 c/w ?hypersensitivity- resolved \par -followed by derm, seen 2021 per pt with nl FBSE\par -followed by A/I - s/p negative patch testing, also found NOT allergic to PCN\par \par hx former tobacco use, hx abnl screening CT scan 9/19- asx\par -hx > 30 yr 1ppd, quit > 20 yrs \par -9/19 CT lung screening: Few less than 0.5 cm solid nodules are noted in the right middle lobe. \par Few patchy opacities are noted in both upper and right middle lobes. Follow-up CT scan is \par recommended in 3 months to ensure resolution. Lung RADS 2: Category benign appearance, continue yearly screening. (hx doing scan concurrent with URI sx's)\par -1/20 CT lung screening: (c/w 9/19) Near complete resolution of the opacities/consolidations in the right upper lobe and the right middle lobe when compared to previous exam.  Stable few small nodules in the right middle lobe when compared to previous exam.  Lungs RADS 2: benign, rec: annual screening.  Rx for repeat placed (pending)- no declines.  States does not wish to have done and pursue surveillance for lung cancer.  Risks/benefits discussed.\par -Cont'd smoking cessation advised.\par -advised prompt medical eval if cough, fever, etc.\par \par \par MISC: Continued social distancing and measure for covid19 prevention encouraged. \par -hx covid vaccine (Moderna) series - 1/12/21, 2/14/21 and booster 8/17/21\par \par \par HCM\par -hx CPE 1/21, yearly advised\par -1/21 cbc/bmp/TSH/B12/folate/vit d/PSA/UA unremarkable\par -hx flu shot 9/21\par -hx PVX 4/11\par -hx prevnar 11/15\par -hx Tdap 7/14\par -hx shingles vaccine 7/20/11\par -hx shingrix series at pharmacy, finished 1/19\par -hx colonoscopy 6/19: colon polyps, rec: repeat in 5 yrs (due 2024) (Dr. Cano)\par -hx nl AAA screening US in 2018 by cardio per pt, declines repeat.  Asked to forward record.\par followed by derm, yearly screening advised. Regular use of sun block for skin cancer prevention counseled.\par -HCP: sonChip- has copy at home, asked to bring copy for chart\par \par Pt's cell: [phone]\par Pt wishes to f/u next for CPE in 1/22, states will call back sooner as needed.  Transferred to staff for assistance.\par

## 2021-11-16 NOTE — DATA REVIEWED
[FreeTextEntry1] : \par outside lab\par \par 10/18/21\par Tchol 119  LDL 58 HDL 38\par \par bmp wnl\par LFTs wnl

## 2021-11-16 NOTE — HISTORY OF PRESENT ILLNESS
[Home] : at home, [unfilled] , at the time of the visit. [Medical Office: ([facility])___] : at the medical office located in  [Verbal consent obtained from patient] : the patient, [unfilled] [FreeTextEntry1] : \par f/u [de-identified] : \par As this is a telemedicine visit, no physical exam could be performed.  Diagnosis and treatment is based on symptoms provided.\par \par \par 75 yo M pmhx HTN, HLD, CAD s/p stents, preDM, anxiety/depression, FL, glaucoma, BPH, lung nodules for f/u\par \par Last visit 9/13/21 for f/u and med refill with labs done.\par \par Feeling well today.\par Needs med refill.\par \par \par Reports is socially distancing and using precautions for covid prevention.\par Denies sick or covid positive contacts.\par Denies fever, chills, cough or sob.\par -hx covid vaccine (Moderna) series - 1/12/21, 2/14/21 and booster 8/17/21\par \par \par hx anxiety/depression- reports stable anxiety- using lorazepam 1-2x/wk.  Notes mildly low mood on/off- but improving, denies HI/SI or panic attacks\par -sleeping and eating well\par -notes son is currently stable, but still struggling with ETOH abuse.  Is living alone in [facility]- overall going okay. Remain in close contact. Remains out of rehab/hospital. Remain on good speaking terms. Feels safe.\par -reports has good social support from friends and neighbors and remains connected despite limitations posed by covid pandemic, all close contacts reportedly received covid vaccines as well\par -does not feel needs therapist \par -notes mild anxiety 2/2 covid 19 pandemic on/off. \par \par hx CAD-\par -hx abnl routine stress test 9/20 and is s/p cardiac cath 10/20- noted high grade mid LAD lesion with stent placed, noted patent RCA and OM stents, restarted on Plavix.  \par -followed by cardio and on med regimen.  States last seen 10/21 with labs done prior to visit reviewed, Plavix d/c'd as told taken x 1 yr and no longer needed.  To f/u in 6mo. \par -denies clinical bleeding\par \par Exercising: walks daily ~ 40 mins- reports feels well with doing\par Denies cough, CP, sob, dizziness or GI complaints. \par -reports 94 yo sister recently dx'd with colon ca and is s/p surgery and recovering at rehab.  he met with GI, Dr. Cano via virtual visit to discuss when he should get c-scope, states told same as advised at c-scope in 6/19, to repeat in 5 yrs\par \par hx right neck pain/numbness with radiation to right shoulder pain and occasional hand tingling, back pain- states dx'd with CTS and had injection on right in 6/21 with improvement since.\par -followed by ortho, seen 10/21 with shoulder injection given\par -followed by PMR, seen 5/21- noted hx gabapentin trial that stopped 2/2 drowsiness with use, declined Lyrica trial.  Tylenol ES advised, noted EMG pending- advised f/u thereafter.\par -PT pending, states hesitant as done prior w/o help\par -on Tylenol PM qhs rare use.  Not taking OTC NSAIDs.\par \par BPH-\par -on Flomax with improved sx's reported\par -followed by , seen 8/21 w/o changes made. Has f/u 2/22.\par  \par

## 2021-11-24 NOTE — ASSESSMENT
[FreeTextEntry1] : Assessment\par 1) tubular adenoma of the ascending colon resected in 2019\par 2) mildly overweight with BMI > 25; however, patient has fatty liver by sonography\par 3) nonalcoholic fatty liver disease, transaminases have been within normal, only a sonographic finding\par 4) diverticulosis\par 5) gallbladder sludge\par 6) anal pain is likely due to hemorrhoidal irritation\par \par Plan\par 1) Colonoscopy for polyp surveillance in 2024 depending upon patient's overall status\par 2) Dietary strategies discussed again with the patient including use of psyllium husk before breakfast and supper; mild exercise also discussed; weight once weekly; think of calory spending \par 3) consider abdominal sonogram to monitor for fatty liver in 2021\par 4) For diverticulosis the patient is instructed in a high fiber diet and increased fluid intake to reduce constipation.  Encouraged to consume popcorn and to disregard any warnings about seeds or nuts which are unfounded. \par 5) Hemorrhoidal care using aloe wipe, witch hazel wipes. psyllium husk, discussed with the patient. \par 6) office followup as needed. \par

## 2021-11-24 NOTE — HISTORY OF PRESENT ILLNESS
[Home] : at home, [unfilled] , at the time of the visit. [Medical Office: ([facility])___] : at the medical office located in  [Verbal consent obtained from patient] : the patient, [unfilled] [de-identified] : Ricky Cross, a 76 year old man is seen for follow-up evaluation of colon polyps and NAFLD. The colonoscopy in 2019 revealed a small tubular adenoma n the ascending colon and small hyperplastic polyps in the ascending colon and rectum.  He is concerned because recently his older sister was diagnosed with colon cancer at age 93.   She required a colostomy.   He is the youngest and she is the oldest of his siblings.  The others are .  The colonoscopy in  revealed 2 adenomatous polyps. A follow-up colonoscopy in  revealed residual polyp which was resected in piece meal technique. The surveillance colonoscopy  revealed 3 small tubular adenomas. The colonoscopy in  was negative for adenomas.  The last colonoscopy in 2019 revealed two small adenoma in the ascending colon and a hyperplastic polyp in the rectum and diverticula in the descending and sigmoid colon.   The patient does not have any biologic children; therefore, his adopted son can wait until age 45 for screening. The patient denies heartburn, indigestion, bloating, dysphagia, change in bowel habit, abdominal pain, hematochezia, melena, or jaundice. He had a cardiac catherization for fatigue in 2019 at [facility].  He notes occasional anal pain and burning with occasional dyschezia.  \par \par An abdominal sonogram in 2019 revealed fatty liver and sludge in the gallbladder.  THe transaminases have remained within normal range AST 17 and ALT 12 in 2021.\par    \par He had had right lower quadrant discomfort in 2014 which has resolved. He then had left flank pain in 2014 when he awakened in the morning. He ascribed it to his disk disease. The pain is not related to movement, bowel movements or eating. The abdominal and pelvic ultrasound from  were both negative except for fatty liver and mild prostatic enlargement. A urinalysis in 2019 revealed oxalate crystals and microscopic hematuria consistent with renal stones. The hepatic profile from 2018 was within normal. He continues to take 81 mg aspirin daily for cardiac prophylaxis.\par \par There is a family history of pancreatic cancer in the father. His mother had breast cancer. There is no other family history of colon cancer, colon polyp, peptic ulcer disease, female genitourinary disease, liver disease, cholelithiasis, pancreatic disease or cancer, inflammatory bowel disease or celiac disease.,. (3) adequate

## 2022-01-05 ENCOUNTER — NON-APPOINTMENT (OUTPATIENT)
Age: 77
End: 2022-01-05

## 2022-01-06 ENCOUNTER — TRANSCRIPTION ENCOUNTER (OUTPATIENT)
Age: 77
End: 2022-01-06

## 2022-01-11 ENCOUNTER — TRANSCRIPTION ENCOUNTER (OUTPATIENT)
Age: 77
End: 2022-01-11

## 2022-01-12 ENCOUNTER — NON-APPOINTMENT (OUTPATIENT)
Age: 77
End: 2022-01-12

## 2022-01-12 ENCOUNTER — TRANSCRIPTION ENCOUNTER (OUTPATIENT)
Age: 77
End: 2022-01-12

## 2022-01-20 NOTE — PHYSICAL EXAM
REASON FOR CONSULTATION:  Cough, RSV, diarrhea.    HISTORY OF PRESENT ILLNESS:  The patient is a 89-year-old, who lives in [facility] across the street I guess, came in with confusion, normally alert, came in a little bit slightly disoriented with a cough.  Her respiratory panel showed RSV by PCR.  She is with less cough now.  She is not short of breath.  She denies nausea or vomiting.  She has had diarrhea overnight and today I do not know if she is on antibiotics.  She has no headache, she is very conversant.  She is fairly oriented at this point, remembers her childhood.  She has no fevers, chills.  No shortness of breath.  Minimal cough.  No abdominal pain.  No nausea, vomiting, minimal diarrhea.    PAST MEDICAL HISTORY:  No known allergies.  She has hypertension and thyroid condition.  She has had no surgeries.    FAMILY HISTORY:  Unremarkable.    REVIEW OF SYSTEMS:  She had fever, cough.  No shortness of breath.  No nausea, vomiting, some diarrhea.  No abdominal pain.  No dysuria or frequency.  Mild confusion, which is improving.    PHYSICAL EXAMINATION:  GENERAL:  Today, she is awake, alert.  Fever which was above 100 last night is normal.  VITAL SIGNS:  Overall are good, O2 sat 96 on room air.  HEENT:  Sclerae were anicteric.  Throat was clear.  NECK:  Supple.  LUNGS:  Clear.  CARDIOVASCULAR:  S1, S2.  No murmur.  ABDOMEN:  Soft, nontender, nondistended.  EXTREMITIES:  No cyanosis, clubbing, or edema.  SKIN:  Had no rash.    LABORATORY DATA:  Her potassium was normal.  LFTs were AST was up at 38.  White count, which was 11.7 on admission, is 8.3.  Glucose normal.  Blood culture is negative.  Viral rapid panel just shows RSV.  UA was unremarkable.  CT of the head show no hemorrhage, no mass effect.  Age-appropriate changes.  CT chest showed mild ground-glass with some atelectasis and some possible mucus plugging.    IMPRESSION:    1. RSV.  2. Diarrhea.  3. Hypertension.  4. Mild  confusion.      PLAN:  Her confusion is clearing.  Her white count is normal.  We will not start antibiotics for her lungs.  We will check a C diff as she is having diarrhea and place her on p.o. vanco empirically.  Otherwise, I would not treat with antibiotics.  I discussed the case with the nurse.    Thank you very much for allowing me to share in the care of this patient.        Dictated By:  Greg Bangura MD        D:  01/20/2022 12:34:22  T:  01/20/2022 17:47:18  ROSA/darío  Job:  [id number]      cc:   [General Appearance - Alert] : alert [General Appearance - In No Acute Distress] : in no acute distress [Neck Appearance] : the appearance of the neck was normal [Neck Cervical Mass (___cm)] : no neck mass was observed [Jugular Venous Distention Increased] : there was no jugular-venous distention [Thyroid Diffuse Enlargement] : the thyroid was not enlarged [Thyroid Nodule] : there were no palpable thyroid nodules [Auscultation Breath Sounds / Voice Sounds] : lungs were clear to auscultation bilaterally [Heart Rate And Rhythm] : heart rate was normal and rhythm regular [Heart Sounds] : normal S1 and S2 [Heart Sounds Gallop] : no gallops [Murmurs] : no murmurs [Heart Sounds Pericardial Friction Rub] : no pericardial rub [Full Pulse] : the pedal pulses are present [Edema] : there was no peripheral edema [Bowel Sounds] : normal bowel sounds [Abdomen Soft] : soft [Abdomen Tenderness] : non-tender [Abdomen Mass (___ Cm)] : no abdominal mass palpated [Normal Sphincter Tone] : normal sphincter tone [No Rectal Mass] : no rectal mass [Prostate Enlargement] : the prostate was not enlarged [Cervical Lymph Nodes Enlarged Posterior Bilaterally] : posterior cervical [Cervical Lymph Nodes Enlarged Anterior Bilaterally] : anterior cervical [Supraclavicular Lymph Nodes Enlarged Bilaterally] : supraclavicular [Axillary Lymph Nodes Enlarged Bilaterally] : axillary [Femoral Lymph Nodes Enlarged Bilaterally] : femoral [Inguinal Lymph Nodes Enlarged Bilaterally] : inguinal [No CVA Tenderness] : no ~M costovertebral angle tenderness [No Spinal Tenderness] : no spinal tenderness [Abnormal Walk] : normal gait [Nail Clubbing] : no clubbing  or cyanosis of the fingernails [Musculoskeletal - Swelling] : no joint swelling seen [Motor Tone] : muscle strength and tone were normal [Skin Color & Pigmentation] : normal skin color and pigmentation [Skin Turgor] : normal skin turgor [] : no rash [Deep Tendon Reflexes (DTR)] : deep tendon reflexes were 2+ and symmetric [Sensation] : the sensory exam was normal to light touch and pinprick [No Focal Deficits] : no focal deficits [Oriented To Time, Place, And Person] : oriented to person, place, and time [Impaired Insight] : insight and judgment were intact [Affect] : the affect was normal [RLQ] : not in the right lower quadrant [Sclera] : the sclera and conjunctiva were normal [PERRL With Normal Accommodation] : pupils were equal in size, round, and reactive to light [Oropharynx] : the oropharynx was normal [Examination Of The Oral Cavity] : the lips and gums were normal [Abdominal Aorta] : the abdominal aorta was normal [Arterial Pulses Carotid] : carotid pulses were normal with no bruits [Internal Hemorrhoid] : no internal hemorrhoids [Occult Blood Positive] : stool was negative for occult blood [FreeTextEntry1] : no tenderness elicited in lower abdomen or prostate

## 2022-01-28 ENCOUNTER — APPOINTMENT (OUTPATIENT)
Dept: INTERNAL MEDICINE | Facility: CLINIC | Age: 77
End: 2022-01-28
Payer: COMMERCIAL

## 2022-01-28 VITALS
BODY MASS INDEX: 25.39 KG/M2 | SYSTOLIC BLOOD PRESSURE: 124 MMHG | HEART RATE: 83 BPM | HEIGHT: 66 IN | TEMPERATURE: 98.2 F | OXYGEN SATURATION: 98 % | DIASTOLIC BLOOD PRESSURE: 68 MMHG | WEIGHT: 158 LBS | RESPIRATION RATE: 16 BRPM

## 2022-01-28 DIAGNOSIS — M54.50 LOW BACK PAIN, UNSPECIFIED: ICD-10-CM

## 2022-01-28 DIAGNOSIS — M54.16 RADICULOPATHY, LUMBAR REGION: ICD-10-CM

## 2022-01-28 DIAGNOSIS — E66.3 OVERWEIGHT: ICD-10-CM

## 2022-01-28 DIAGNOSIS — G89.29 LOW BACK PAIN, UNSPECIFIED: ICD-10-CM

## 2022-01-28 DIAGNOSIS — K62.89 OTHER SPECIFIED DISEASES OF ANUS AND RECTUM: ICD-10-CM

## 2022-01-28 DIAGNOSIS — Z87.09 PERSONAL HISTORY OF OTHER DISEASES OF THE RESPIRATORY SYSTEM: ICD-10-CM

## 2022-01-28 DIAGNOSIS — M47.816 SPONDYLOSIS W/OUT MYELOPATHY OR RADICULOPATHY, LUMBAR REGION: ICD-10-CM

## 2022-01-28 PROCEDURE — 36415 COLL VENOUS BLD VENIPUNCTURE: CPT

## 2022-01-28 PROCEDURE — G0444 DEPRESSION SCREEN ANNUAL: CPT | Mod: 59

## 2022-01-28 PROCEDURE — G0439: CPT

## 2022-01-29 PROBLEM — M47.816 LUMBAR SPONDYLOSIS: Status: RESOLVED | Noted: 2021-04-09 | Resolved: 2022-01-29

## 2022-01-29 PROBLEM — Z87.09 HISTORY OF VASOMOTOR RHINITIS: Status: RESOLVED | Noted: 2019-08-13 | Resolved: 2022-01-29

## 2022-01-29 PROBLEM — K62.89 ANAL PAIN: Status: RESOLVED | Noted: 2021-11-09 | Resolved: 2022-01-29

## 2022-01-29 PROBLEM — M54.16 ACUTE LUMBAR RADICULOPATHY: Status: RESOLVED | Noted: 2021-03-29 | Resolved: 2022-01-29

## 2022-01-29 NOTE — HISTORY OF PRESENT ILLNESS
[Health Maintenance] : health maintenance [Former Cigarette Smoker] : is a former cigarette smoker [Occasional Use] : occasional alcohol use [Never] : has never used illicit drugs [Good] : good [Reg. Dental Visits] : He has regular dental visits [Hearing Loss] : He has hearing loss [Healthy Diet] : He consumes a diverse and healthy diet [Regular Exercise] : He exercises regularly [Binge Drinking] : denies binge drinking [Patient Concern] : no personal concern about alcohol use [Family Concern] : no family concern about alcohol use [Vision Problems] : He denies vision problems [Sexually Active] : the patient is not sexually active [de-identified] : 1/21 [de-identified] : alone [de-identified] :  [de-identified] : retired [de-identified] : hx > 30 yr 1ppd, quit > 20 yrs  [de-identified] : hx flu shot 9/21, hx Tdap 7/14, PVX 4/11, hx prevnar 11/15, hx shingles vaccine 7/11, hx shingrix vaccine series (finished 1/19) [de-identified] : \par 75 yo M pmhx HTN, HLD, CAD s/p stents, preDM, anxiety/depression, FL, glaucoma, BPH, lung nodules for AWV\par \par Last visit 11/16/21 for f/u.\par \par Feeling well today.\par Needs med refill.\par Had tea (little milk) with pear 2.5 hrs ago- wants labs today\par \par \par Reports is socially distancing and using precautions for covid prevention.\par Denies sick or covid positive contacts.\par Denies fever, chills, cough or sob.\par -hx covid vaccine (Moderna) series - 1/12/21, 2/14/21 and booster 8/17/21\par \par hx anxiety/depression- reports stable anxiety.  Notes mildly low mood on/off- but improving, denies HI/SI or panic attacks\par -using lorazepam 1-2x/wk mainly qhs.  \par -states was considering Ketamine program for mood that had read about in [place] times- has spoken to his cardiologist who advised against it due to potential cardiac SEs and since he has decided to not pursue it.\par -sleeping and eating well\par -notes son is currently stable, but still struggling with ETOH abuse.  Is living alone in [facility]- overall going okay. Remain in close contact. Remains out of rehab/hospital. Remain on good speaking terms. Feels safe.\par -reports has good social support from friends and neighbors and remains connected despite limitations posed by covid pandemic, all close contacts reportedly received covid vaccines as well\par -does not feel needs therapist \par \par hx CAD-\par -hx abnl routine stress test 9/20 and is s/p cardiac cath 10/20- noted high grade mid LAD lesion with stent placed, noted patent RCA and OM stents, restarted on Plavix.  \par -followed by cardio and on med regimen.  States last seen 10/21 with labs done prior to visit reviewed, Plavix d/c'd as told taken x 1 yr and no longer needed.  To f/u in 6 mo. \par -denies clinical bleeding\par \par Exercising: walks daily 45 mins- done w/o sx's or limitations\par Denies cough, CP, sob, dizziness or GI complaints. \par -reports 92 yo sister recently dx'd with colon ca and is s/p surgery and recovering at rehab.  He met with GI, Dr. Cano 11/21 via virtual visit to discuss when he should get c-scope, states told same as advised at c-scope in 6/19, to repeat in 5 yrs\par \par hx right neck pain/numbness with radiation to right shoulder pain and occasional hand tingling, back pain- states dx'd with CTS and had injection on right in 6/21 with improvement since.\par -followed by ortho, seen 10/21 with shoulder injection given\par -followed by PMR, seen 5/21- noted hx gabapentin trial that stopped 2/2 drowsiness with use, declined Lyrica trial.  Tylenol ES advised, noted EMG pending- advised f/u thereafter.\par -PT pending, states hesitant as done prior w/o help\par -on Tylenol PM qhs rare use.  Not taking OTC NSAIDs.\par \par BPH, nocturia- on Flomax with improved sx's reported\par -followed by , seen 8/21 w/o changes made. Has f/u 2/22.\par

## 2022-01-29 NOTE — REVIEW OF SYSTEMS
[see HPI] : see HPI [Negative] : Integumentary [Dysuria] : no dysuria [Limb Weakness] : no limb weakness [Suicidal] : not suicidal [Depression] : no depression

## 2022-01-29 NOTE — HEALTH RISK ASSESSMENT
[No falls in past year] : Patient reported no falls in the past year [2] : 2 [HIV test declined] : HIV test declined [Fully functional (bathing, dressing, toileting, transferring, walking, feeding)] : Fully functional (bathing, dressing, toileting, transferring, walking, feeding) [Fully functional (using the telephone, shopping, preparing meals, housekeeping, doing laundry, using] : Fully functional and needs no help or supervision to perform IADLs (using the telephone, shopping, preparing meals, housekeeping, doing laundry, using transportation, managing medications and managing finances) [Smoke Detector] : smoke detector [Carbon Monoxide Detector] : carbon monoxide detector [Seat Belt] :  uses seat belt [Feels Safe at Home] : Feels safe at home [With Patient/Caregiver] : , with patient/caregiver [Designated Healthcare Proxy] : Designated healthcare proxy [Name: ___] : Health Care Proxy's Name: [unfilled]  [Relationship: ___] : Relationship: [unfilled] [0] : 2) Feeling down, depressed, or hopeless: Not at all (0) [PHQ-2 Negative - No further assessment needed] : PHQ-2 Negative - No further assessment needed [Hepatitis C test offered] : Hepatitis C test offered [Sunscreen] : uses sunscreen [Audit-CScore] : 3 [SQI1Fvnqy] : 0 [LowDoseCTScan] : 01/20 [Guns at Home] : no guns at home [ColonoscopyDate] : 06/19 [ColonoscopyComments] : colon polyps, rec: repeat in 5 yrs (Dr. Cano) [AdvancecareDate] : 01/22

## 2022-01-29 NOTE — ASSESSMENT
[FreeTextEntry1] : \par 75 yo M pmhx HTN, HLD, CAD s/p stents, preDM, anxiety/depression, FL, glaucoma, BPH, lung nodules for AWV\par \par \par anxiety/depression- stable mood, denies acute sx's\par -on lorazepam prn, refilled (iSTOP checked)\par -hx considering Ketamine program for mood that had read about in [place] times- has spoken to his cardiologist who advised against it due to potential cardiac SEs and since he has decided to not pursue it.\par -declines  referral\par -advised to f/u if sx's worsen\par \par CAD hx stents (last 10/20), HTN, HLD- 4/21 (by cardio) Tchol 126  (was 177) LDL 65 HDL 40; LFTs wnl\par -hx self d/c'd Lovaza 1/14 due to cost (no hx adverse rxn)\par -hx cardiac cath 10/20- noted high grade mid LAD lesion with stent placed, noted patent RCA and OM stents. \par -followed by cardio (Dr. Alcocer, [facility]) - States last seen 10/21 with labs done prior to visit reviewed, Plavix d/c'd as told taken x 1 yr and no longer needed.  To f/u in 6 mo.  Asked to forward med records\par -off pravastatin 2/2 diarrhea with use\par -on  Crestor, tolerating w/o SEs\par -off Plavix\par -on ASA and ramipril\par -1/21 bmp/TSH, screening UA/prt:crt unremarkable\par -10/21 cmp wnl\par -check cmp, TSH, UA\par \par preDM- 9/21 A1c 5.7 (was 5.5)\par -ADA diet and exercise counseled\par -chec A1c\par \par hx microscopic hematuria, BPH- on Flomax with improved sx's reported\par -off myrbetriq 2/2 constipation\par -self d/c'd oxybutynin since  visit as did not like taking it- declines medication use at this time\par -1/21 bmp/PSA/UA/Ucx unremarkable - check repeat\par -followed by , seen 8/21 w/o changes made. Has f/u 2/22.\par -advised to f/u if sx's worsen, dysuria, hesitancy, etc.\par \par hx fatty liver, colon polyps- asx\par -6/19 screening colonoscopy: +polyps, rec: repeat in 5 yrs\par -9/19 US abdomen: fatty liver, planned to repeat in 1 yr - pt declines repeat imaging.\par -followed by GI, Dr. Cano - had virtual visit 11/21, told c-scope due as advised prior 5 yrs from last in 6/19.  Last seen 11/21.\par -10/21 LFTs wnl\par -low fat diet, exercise advised\par \par hx glaucoma, detached retina repair- asx\par -followed by optho. Seen q 6mo. Last  1/21- told stable findings, no med changes made.  F/U pending 2/22.\par -on gtt\par \par hx right neck/shoulder/ left upper back radiculopathy pain, hip pain, hx lumbar stenosis, b/l CTS- better\par -hx injection 11/15\par -hx followed for hip/lumbar stenosis at [facility]- seen 2/18, s/p home PT on own with improvement\par -followed by ortho, seen 10/21 with shoulder injected\par -followed by PMR, seen 5/21- noted hx gabapentin trial per ortho that stopped 2/2 drowsiness with use, declined Lyrica trial. Tylenol ES advised, noted EMG pending- advised f/u thereafter.\par -PT pending, states hesitant as done prior w/o help\par -on Tylenol prn\par \par hx b/l hearing loss- stable\par -followed by ENT, seen f/u 2/21 - followed yearly, has f/u 2/22\par -using hearing aides\par -advised to avoid qtips, use OTC wax removing drops with cotton prn\par \par hx left hand pain- dx'd L- de Quervain's and is s/p injection x 2 (last 10/18)- now resolved.  hx similar sx's on right s/p injection 3/21 with improvement\par -8/18 xray: STT joint OA \par -followed by hand ortho\par -on Tylenol prn\par \par hx skin rash- hives, unclear etiology, s/p bx by derm 9/17 c/w ?hypersensitivity- resolved \par -followed by derm, seen 2021 per pt with nl FBSE\par -followed by A/I - s/p negative patch testing, also found NOT allergic to PCN\par \par hx former tobacco use, hx abnl screening CT scan 9/19- asx\par -hx > 30 yr 1ppd, quit > 20 yrs \par -9/19 CT lung screening: Few less than 0.5 cm solid nodules are noted in the right middle lobe. \par Few patchy opacities are noted in both upper and right middle lobes. Follow-up CT scan is \par recommended in 3 months to ensure resolution. Lung RADS 2: Category benign appearance, continue yearly screening. (hx doing scan concurrent with URI sx's)\par -1/20 CT lung screening: (c/w 9/19) Near complete resolution of the opacities/consolidations in the right upper lobe and the right middle lobe when compared to previous exam.  Stable few small nodules in the right middle lobe when compared to previous exam.  Lungs RADS 2: benign, rec: annual screening.  Rx for repeat placed (pending)- no declines.  States does not wish to have done and pursue surveillance for lung cancer.  Risks/benefits discussed.\par -Cont'd smoking cessation advised.\par -advised prompt medical eval if cough, fever, etc.\par \par \par MISC: Continued social distancing and measure for covid19 prevention encouraged. \par -hx covid vaccine (Moderna) series - 1/12/21, 2/14/21 and booster 8/17/21\par \par \par HCM\par -check screening labs; declines HIV/STD screening -willing for hep C screening\par -hx flu shot 9/21\par -hx PVX 4/11\par -hx prevnar 11/15\par -hx Tdap 7/14\par -hx shingrix series at pharmacy, finished 1/19\par -hx colonoscopy 6/19: colon polyps, rec: repeat in 5 yrs (due 2024) (Dr. Cano)\par -hx nl AAA screening US in 2018 by cardio per pt, declines repeat.  Asked to forward record.\par followed by derm, yearly screening advised. Regular use of sun block for skin cancer prevention counseled.\par -yearly dental screening advised\par -HCP: sonChip- has copy at home, asked to bring copy for chart\par \par Pt's cell: [phone]\par \par Labs drawn in office today.\par

## 2022-01-29 NOTE — PHYSICAL EXAM
[General Appearance - Alert] : alert [General Appearance - In No Acute Distress] : in no acute distress [General Appearance - Well-Appearing] : healthy appearing [Sclera] : the sclera and conjunctiva were normal [PERRL With Normal Accommodation] : pupils were equal in size, round, and reactive to light [Extraocular Movements] : extraocular movements were intact [Outer Ear] : the ears and nose were normal in appearance [Nasal Cavity] : the nasal mucosa and septum were normal [Oropharynx] : the oropharynx was normal [Neck Appearance] : the appearance of the neck was normal [Thyroid Diffuse Enlargement] : the thyroid was not enlarged [Respiration, Rhythm And Depth] : normal respiratory rhythm and effort [Auscultation Breath Sounds / Voice Sounds] : lungs were clear to auscultation bilaterally [Heart Rate And Rhythm] : heart rate was normal and rhythm regular [Heart Sounds] : normal S1 and S2 [Murmurs] : no murmurs [Abdominal Aorta] : the abdominal aorta was normal [Full Pulse] : the pedal pulses are present [Edema] : there was no peripheral edema [Bowel Sounds] : normal bowel sounds [Abdomen Soft] : soft [Abdomen Tenderness] : non-tender [Cervical Lymph Nodes Enlarged Posterior Bilaterally] : posterior cervical [Cervical Lymph Nodes Enlarged Anterior Bilaterally] : anterior cervical [Supraclavicular Lymph Nodes Enlarged Bilaterally] : supraclavicular [No CVA Tenderness] : no ~M costovertebral angle tenderness [No Spinal Tenderness] : no spinal tenderness [Abnormal Walk] : normal gait [Musculoskeletal - Swelling] : no joint swelling seen [] : no rash [No Focal Deficits] : no focal deficits [Oriented To Time, Place, And Person] : oriented to person, place, and time [Affect] : the affect was normal [Mood] : the mood was normal [Both Tympanic Membranes Were Examined] : both tympanic membranes were normal [Thyroid Nodule] : there were no palpable thyroid nodules [Arterial Pulses Carotid] : carotid pulses were normal with no bruits [de-identified] : declines prostate exam, defers to urologist [FreeTextEntry1] : scattered freckles

## 2022-01-30 LAB
25(OH)D3 SERPL-MCNC: 41.7 NG/ML
ALBUMIN SERPL ELPH-MCNC: 4.4 G/DL
ALP BLD-CCNC: 59 U/L
ALT SERPL-CCNC: 14 U/L
ANION GAP SERPL CALC-SCNC: 10 MMOL/L
APPEARANCE: CLEAR
AST SERPL-CCNC: 17 U/L
BACTERIA UR CULT: NORMAL
BACTERIA: NEGATIVE
BASOPHILS # BLD AUTO: 0.03 K/UL
BASOPHILS NFR BLD AUTO: 0.6 %
BILIRUB SERPL-MCNC: 0.5 MG/DL
BILIRUBIN URINE: NEGATIVE
BLOOD URINE: ABNORMAL
BUN SERPL-MCNC: 16 MG/DL
CALCIUM OXALATE CRYSTALS: ABNORMAL
CALCIUM SERPL-MCNC: 9.2 MG/DL
CHLORIDE SERPL-SCNC: 103 MMOL/L
CHOLEST SERPL-MCNC: 149 MG/DL
CO2 SERPL-SCNC: 27 MMOL/L
COLOR: YELLOW
CREAT SERPL-MCNC: 0.98 MG/DL
EOSINOPHIL # BLD AUTO: 0.08 K/UL
EOSINOPHIL NFR BLD AUTO: 1.6 %
ESTIMATED AVERAGE GLUCOSE: 117 MG/DL
GLUCOSE QUALITATIVE U: NEGATIVE
GLUCOSE SERPL-MCNC: 102 MG/DL
HBA1C MFR BLD HPLC: 5.7 %
HCT VFR BLD CALC: 50.3 %
HCV AB SER QL: NONREACTIVE
HCV S/CO RATIO: 0.14 S/CO
HDLC SERPL-MCNC: 37 MG/DL
HGB BLD-MCNC: 16.1 G/DL
HYALINE CASTS: 0 /LPF
IMM GRANULOCYTES NFR BLD AUTO: 0 %
KETONES URINE: NEGATIVE
LDLC SERPL CALC-MCNC: 81 MG/DL
LEUKOCYTE ESTERASE URINE: NEGATIVE
LYMPHOCYTES # BLD AUTO: 1.02 K/UL
LYMPHOCYTES NFR BLD AUTO: 21 %
MAN DIFF?: NORMAL
MCHC RBC-ENTMCNC: 31 PG
MCHC RBC-ENTMCNC: 32 GM/DL
MCV RBC AUTO: 96.7 FL
MICROSCOPIC-UA: NORMAL
MONOCYTES # BLD AUTO: 0.58 K/UL
MONOCYTES NFR BLD AUTO: 12 %
NEUTROPHILS # BLD AUTO: 3.14 K/UL
NEUTROPHILS NFR BLD AUTO: 64.8 %
NITRITE URINE: NEGATIVE
NONHDLC SERPL-MCNC: 111 MG/DL
PH URINE: 5.5
PLATELET # BLD AUTO: 172 K/UL
POTASSIUM SERPL-SCNC: 4.7 MMOL/L
PROT SERPL-MCNC: 6.9 G/DL
PROTEIN URINE: NEGATIVE
PSA SERPL-MCNC: 1.81 NG/ML
RBC # BLD: 5.2 M/UL
RBC # FLD: 12.6 %
RED BLOOD CELLS URINE: 4 /HPF
SODIUM SERPL-SCNC: 140 MMOL/L
SPECIFIC GRAVITY URINE: 1.02
SQUAMOUS EPITHELIAL CELLS: 1 /HPF
TRIGL SERPL-MCNC: 152 MG/DL
TSH SERPL-ACNC: 2.7 UIU/ML
UROBILINOGEN URINE: NORMAL
WBC # FLD AUTO: 4.85 K/UL
WHITE BLOOD CELLS URINE: 0 /HPF

## 2022-02-28 ENCOUNTER — APPOINTMENT (OUTPATIENT)
Dept: UROLOGY | Facility: CLINIC | Age: 77
End: 2022-02-28
Payer: MEDICARE

## 2022-02-28 PROCEDURE — 99214 OFFICE O/P EST MOD 30 MIN: CPT

## 2022-02-28 PROCEDURE — 51798 US URINE CAPACITY MEASURE: CPT

## 2022-02-28 NOTE — LETTER BODY
[FreeTextEntry1] : Martha Ulloa MD\par [address], \[place] [address]\par [phone]\par \par Dear Dr. Ulloa,\par \par Reason for Visit: BPH.\par \par This is a 76 year old gentleman with symptoms of BPH. Patient returns today for follow up. He was previously taking Myrbetriq and Oxybutynin which were both ineffective. He returns today for follow up. Since he was last seen, the patient has been taking Flomax QD regularly as directed. He reports progressive urinary symptoms despite medical therapy. He denies any hematuria or urinary incontinence. He reports no pain. All other review of systems are negative. His social and family history remain unchanged. Past medical history, family history and social history were inquired and were noncontributory to current condition. Medications and allergies were reviewed. He has no known allergies to medication. \par \par On examination, the patient is a elderly-appearing gentleman in no acute distress. He is alert and oriented follows commands. He has normal mood and affect. He is normocephalic. Neck is supple. Oral no thrush Respirations are unlabored. His abdomen is soft and nontender. Bladder is nonpalpable. No CVA tenderness. Neurologically he is grossly intact. No peripheral edema. Skin without gross abnormality. He has normal male external genitalia. Normal meatus. Bilateral testes are descended intrascrotally and normal to palpation. On rectal examination, there is normal sphincter tone. The prostate is clinically benign without focal induration or nodularity.\par \par His recent CMP demonstrated normal renal functions, creatinine 0.98. His PSA was 1.81, which is within normal limits. \par \par Post-void residual on bladder scan today was 36 cc. \par \par ASSESSMENT: BPH, progressive symptoms with Flomax QD. \par \par I counseled the patient. His PVR today was 36 cc. He reports progressive urinary symptoms despite medical therapy. I recommended the patient increase Flomax to BID. I renewed the patient's prescription for Flomax BID today. I encouraged the patient to continue medications regularly as directed. He will follow up in 1 month. Patient understands that if he develops gross hematuria or any urinary discomfort, he will contact me for further evaluation. Risks and alternatives were discussed. I answered the patient questions. The patient will follow-up as directed and will contact me with any questions or concerns. Thank you for the opportunity to participate in the care of Mr. SOTELO. I will keep you updated on his progress. \par \par Plan: Increase Flomax to BID. Follow up in 1 month.

## 2022-03-28 ENCOUNTER — APPOINTMENT (OUTPATIENT)
Dept: INTERNAL MEDICINE | Facility: CLINIC | Age: 77
End: 2022-03-28
Payer: MEDICARE

## 2022-03-28 VITALS
OXYGEN SATURATION: 96 % | HEART RATE: 76 BPM | BODY MASS INDEX: 26.36 KG/M2 | HEIGHT: 66 IN | DIASTOLIC BLOOD PRESSURE: 62 MMHG | WEIGHT: 164 LBS | SYSTOLIC BLOOD PRESSURE: 134 MMHG | TEMPERATURE: 97.9 F | RESPIRATION RATE: 16 BRPM

## 2022-03-28 PROCEDURE — 99213 OFFICE O/P EST LOW 20 MIN: CPT

## 2022-03-28 NOTE — PHYSICAL EXAM
[General Appearance - Alert] : alert [General Appearance - In No Acute Distress] : in no acute distress [General Appearance - Well-Appearing] : healthy appearing [Sclera] : the sclera and conjunctiva were normal [PERRL With Normal Accommodation] : pupils were equal in size, round, and reactive to light [Extraocular Movements] : extraocular movements were intact [Outer Ear] : the ears and nose were normal in appearance [Oropharynx] : the oropharynx was normal [Nasal Cavity] : the nasal mucosa and septum were normal [Neck Appearance] : the appearance of the neck was normal [Thyroid Diffuse Enlargement] : the thyroid was not enlarged [Respiration, Rhythm And Depth] : normal respiratory rhythm and effort [Auscultation Breath Sounds / Voice Sounds] : lungs were clear to auscultation bilaterally [Heart Rate And Rhythm] : heart rate was normal and rhythm regular [Heart Sounds] : normal S1 and S2 [Arterial Pulses Carotid] : carotid pulses were normal with no bruits [Murmurs] : no murmurs [Edema] : there was no peripheral edema [Full Pulse] : the pedal pulses are present [Bowel Sounds] : normal bowel sounds [Abdomen Soft] : soft [Abdomen Tenderness] : non-tender [Cervical Lymph Nodes Enlarged Posterior Bilaterally] : posterior cervical [Cervical Lymph Nodes Enlarged Anterior Bilaterally] : anterior cervical [Supraclavicular Lymph Nodes Enlarged Bilaterally] : supraclavicular [No CVA Tenderness] : no ~M costovertebral angle tenderness [No Spinal Tenderness] : no spinal tenderness [Abnormal Walk] : normal gait [Musculoskeletal - Swelling] : no joint swelling seen [] : no rash [No Focal Deficits] : no focal deficits [Oriented To Time, Place, And Person] : oriented to person, place, and time [Affect] : the affect was normal [Mood] : the mood was normal [FreeTextEntry1] : scattered freckles

## 2022-03-28 NOTE — ASSESSMENT
[FreeTextEntry1] : \par 76 yo M pmhx HTN, HLD, CAD s/p stents, preDM, anxiety/depression, FL, glaucoma, BPH, lung nodules for f/u\par \par \par anxiety/depression- stable mood, denies acute sx's\par -on lorazepam prn, refilled (iSTOP checked)\par -hx considering Ketamine program for mood that had read about in [place] times- has spoken to his cardiologist who advised against it due to potential cardiac SEs and since he has decided to not pursue it.\par -declines  referral\par -advised to f/u if sx's worsen\par \par CAD hx stents (last 10/20), HTN, HLD- 1/22 Tchol 149  LDL 81 HDL 37; LFTs wnl\par -hx self d/c'd Lovaza 1/14 due to cost (no hx adverse rxn)\par -hx cardiac cath 10/20- noted high grade mid LAD lesion with stent placed, noted patent RCA and OM stents. \par -followed by cardio (Dr. Alcocer, [facility]) - States last seen 10/21 with labs done prior to visit reviewed, Plavix d/c'd as told taken x 1 yr and no longer needed.  To f/u in 6 mo- has appt 4/22 with labs planned then (declines today for this reason).  Asked to forward med records\par -off pravastatin 2/2 diarrhea with use\par -on  Crestor, tolerating w/o SEs\par -off Plavix\par -on ASA and ramipril\par -1/22 cmp/TSH, screening UA unremarkable\par \par preDM- 1/22 A1c 5.7 (was 5.7)\par -ADA diet and exercise counseled\par \par hx microscopic hematuria, BPH- stable per pt\par -off myrbetriq 2/2 constipation\par -self d/c'd oxybutynin since  visit as did not like taking it- declines medication use at this time\par -1/22 bmp/PSA/UA/Ucx unremarkable \par -followed by , seen 2/22 with Flomax increased to BID. Has f/u 3/22.\par -advised to f/u if sx's worsen, dysuria, hesitancy, etc.\par \par hx fatty liver, colon polyps- asx\par -6/19 screening colonoscopy: +polyps, rec: repeat in 5 yrs\par -9/19 US abdomen: fatty liver, planned to repeat in 1 yr - pt declines repeat imaging.\par -followed by GI, Dr. Cano - had virtual visit 11/21, told c-scope due as advised prior 5 yrs from last in 6/19.  Last seen 11/21.\par -1/22 LFTs wnl\par -low fat diet, exercise advised\par \par hx glaucoma, detached retina repair- asx\par -followed by optho. Seen q 6mo. Last  2/22- told stable findings, nl dilated exam and no med changes made per pt.\par -on gtt\par \par hx right neck/shoulder/ left upper back radiculopathy pain, hip pain, hx lumbar stenosis, b/l CTS- \par -hx injection 11/15\par -hx followed for hip/lumbar stenosis at [facility]- seen 2/18, s/p home PT on own with improvement\par -followed by ortho, seen 10/21 with shoulder injected\par -followed by PMR, seen 5/21- noted hx gabapentin trial per ortho that stopped 2/2 drowsiness with use, declined Lyrica trial. Tylenol ES advised, noted EMG pending- advised f/u thereafter.  Has f/u 4/22.\par -PT pending, states hesitant as done prior w/o help\par -on Tylenol prn\par \par hx b/l hearing loss- stable\par -followed by ENT, seen f/u 2/22 per pt - followed yearly\par -using hearing aides\par -advised to avoid qtips, use OTC wax removing drops with cotton prn\par \par hx left hand pain- dx'd L- de Quervain's and is s/p injection x 2 (last 10/18)- now resolved.  hx similar sx's on right s/p injection 3/21 with improvement\par -8/18 xray: STT joint OA \par -followed by hand ortho\par -on Tylenol prn\par \par hx skin rash- hives, unclear etiology, s/p bx by derm 9/17 c/w ?hypersensitivity- resolved \par -followed by derm, seen 2021 per pt with nl FBSE\par -followed by A/I - s/p negative patch testing, also found NOT allergic to PCN\par \par hx former tobacco use, hx abnl screening CT scan 9/19- asx\par -hx > 30 yr 1ppd, quit > 20 yrs \par -9/19 CT lung screening: Few less than 0.5 cm solid nodules are noted in the right middle lobe. \par Few patchy opacities are noted in both upper and right middle lobes. Follow-up CT scan is \par recommended in 3 months to ensure resolution. Lung RADS 2: Category benign appearance, continue yearly screening. (hx doing scan concurrent with URI sx's)\par -1/20 CT lung screening: (c/w 9/19) Near complete resolution of the opacities/consolidations in the right upper lobe and the right middle lobe when compared to previous exam.  Stable few small nodules in the right middle lobe when compared to previous exam.  Lungs RADS 2: benign, rec: annual screening.  Rx for repeat placed (pending)- no declines.  States does not wish to have done and pursue surveillance for lung cancer.  Risks/benefits discussed.\par -Cont'd smoking cessation advised.\par -advised prompt medical eval if cough, fever, etc.\par \par \par MISC: Continued social distancing and measure for covid19 prevention encouraged. \par -hx covid vaccine (Moderna) series - 1/12/21, 2/14/21 and booster 8/17/21\par \par \par HCM\par -hx CPE 1/22\par -1/22 hep C screening negative\par -hx flu shot 9/21\par -hx PVX 4/11\par -hx prevnar 11/15\par -hx Tdap 7/14\par -hx shingrix series at pharmacy, finished 1/19\par -hx colonoscopy 6/19: colon polyps, rec: repeat in 5 yrs (due 2024) (Dr. Cano)\par -hx nl AAA screening US in 2018 by cardio per pt, declines repeat.  Asked to forward record.\par followed by derm, yearly screening advised. Regular use of sun block for skin cancer prevention counseled.\par -HCP: Alex Tamra, [phone] and sonChip, [phone] (copy for chart 2/7/22)\par \par Pt's cell: [phone]\par

## 2022-03-28 NOTE — HISTORY OF PRESENT ILLNESS
[FreeTextEntry1] : \par f/u [de-identified] : \par 78 yo M pmhx HTN, HLD, CAD s/p stents, preDM, anxiety/depression, FL, glaucoma, BPH, lung nodules for f/u\par \par Last visit 22 for AWV with labs done.\par \par Feeling well today.\par Needs med refill.\par \par \par Reports is socially distancing and using precautions for covid prevention.\par Denies sick or covid positive contacts.\par Denies fever, chills, cough or sob.\par -hx covid vaccine (Moderna) series - 21, 21 and booster 21\par \par hx anxiety/depression- reports stable mood, denies HI/SI or panic attacks.  Notes 91 yo sister  (multiple medical issues) last week- feels is coping well.\par -using lorazepam few times per week mainly qhs.  \par -hx considering Ketamine program for mood that had read about in [place] times- has spoken to his cardiologist who advised against it due to potential cardiac SEs and since he has decided to not pursue it.\par -sleeping and eating well\par -notes son is currently stable, but still struggling with ETOH abuse.  Is living alone in [facility]- overall going okay. Remain in close contact. Remains out of rehab/hospital. Remain on good speaking terms. Feels safe.\par -reports has good social support from friends and neighbors and remains connected despite limitations posed by covid pandemic, all close contacts reportedly received covid vaccines as well\par -does not feel needs therapist \par \par hx CAD, HTN, HLD-\par -hx abnl routine stress test  and is s/p cardiac cath 10/20- noted high grade mid LAD lesion with stent placed, noted patent RCA and OM stents, restarted on Plavix.  \par -followed by cardio and on med regimen.  States last seen 10/21 with labs done prior to visit reviewed, Plavix d/c'd as told taken x 1 yr and no longer needed.  To f/u in 6 mo- has appt  with labs planned then (declines today for this reason). \par -denies clinical bleeding\par -home BPs 130s/70s\par -hx optho eval - told nl dilated exam and to f/u in 6mo per pt\par \par Reports low fat/salt/carb diet\par Exercising: walks daily 45 mins- done w/o sx's or limitations\par Denies cough, CP, sob, dizziness or GI complaints. \par -reports 94 yo sister recently dx'd with colon ca and is s/p surgery and recovering at rehab.  He met with GI, Dr. Cano  via virtual visit to discuss when he should get c-scope, states told same as advised at c-scope in , to repeat in 5 yrs\par \par hx right neck pain/numbness with radiation to right shoulder pain and occasional hand tingling, back pain- recently mainly right shoulder issues at night, no pain meds taken regularly.  Using OTC topical and TENS.  Is awaiting PMR eval \par -states dx'd with CTS and had injection on right in  with improvement since.\par -followed by ortho, seen 10/21 with shoulder injection given\par -followed by PMR, seen - noted hx gabapentin trial that stopped 2/2 drowsiness with use, declined Lyrica trial.  Tylenol ES advised, noted EMG pending- advised f/u thereafter.\par -PT pending, states hesitant as done prior w/o help\par -on Tylenol PM qhs rare use.  Not taking OTC NSAIDs.\par -denies focal weakness or activity limitations\par \par BPH, nocturia- reports stable chronic sx's, denies new  complaints\par -followed by , seen  with Flomax increased to BID. Has f/u 3/22.\par

## 2022-03-31 ENCOUNTER — APPOINTMENT (OUTPATIENT)
Dept: UROLOGY | Facility: CLINIC | Age: 77
End: 2022-03-31
Payer: MEDICARE

## 2022-03-31 PROCEDURE — 99214 OFFICE O/P EST MOD 30 MIN: CPT

## 2022-03-31 NOTE — LETTER BODY
[FreeTextEntry1] : Martha Ulloa MD\par [address], \[place] [address]\par [phone]\par \par Dear Dr. Ulloa,\par \par Reason for Visit: BPH.\par \par This is a 76 year old gentleman with symptoms of BPH. He was previously taking Myrbetriq and Oxybutynin, which were both ineffective. The patient returns today for follow up.  Since he was last seen, the patient has been taking Flomax BID regularly without any side effects or difficulties with the medication. He reports improved strong uroflow with medical therapy. However, he complains of persistent nocturia. The patient denies any hematuria or urinary incontinence. He reports no pain. All other review of systems are negative. His social and family history remain unchanged. Past medical history, family history and social history were inquired and were noncontributory to current condition. Medications and allergies were reviewed. He has no known allergies to medication. \par \par On examination, the patient is a elderly-appearing gentleman in no acute distress. He is alert and oriented follows commands. He has normal mood and affect. He is normocephalic. Neck is supple. Oral no thrush Respirations are unlabored. His abdomen is soft and nontender. Bladder is nonpalpable. No CVA tenderness. Neurologically he is grossly intact. No peripheral edema. Skin without gross abnormality. He has normal male external genitalia. Normal meatus. Bilateral testes are descended intrascrotally and normal to palpation. On rectal examination, there is normal sphincter tone. The prostate is clinically benign without focal induration or nodularity.\par \par His recent CMP demonstrated normal renal functions, creatinine 0.98. His PSA was 1.81, which is within normal limits. \par \par ASSESSMENT: BPH, improved urinary symptoms. Nocturia. \par \par I counseled the patient. He reports improved uroflow with medical therapy. However, he complains of persistent nocturia. I recommended the patient begin a trial of Trospium for his nocturia. I discussed the potential side effects of the medication. I counseled the patient on its use and side effects. If the patient develops any side effects, the patient will discontinue the medication and contact me.  I also recommended he continue taking Flomax BID. I renewed the patient's prescription for Flomax today. I encouraged the patient to continue medications regularly as directed. Risks and alternatives were discussed. I answered the patient questions. The patient will follow-up as directed and will contact me with any questions or concerns. Thank you for the opportunity to participate in the care of Mr. SOTELO. I will keep you updated on his progress. \par \par Plan: Trial of Trospium. Continue Flomax BID.  Follow up as directed.

## 2022-04-01 ENCOUNTER — APPOINTMENT (OUTPATIENT)
Dept: PHYSICAL MEDICINE AND REHAB | Facility: CLINIC | Age: 77
End: 2022-04-01
Payer: MEDICARE

## 2022-04-01 VITALS
SYSTOLIC BLOOD PRESSURE: 106 MMHG | DIASTOLIC BLOOD PRESSURE: 65 MMHG | TEMPERATURE: 97.2 F | HEART RATE: 78 BPM | OXYGEN SATURATION: 97 %

## 2022-04-01 PROCEDURE — 99213 OFFICE O/P EST LOW 20 MIN: CPT

## 2022-04-01 NOTE — HISTORY OF PRESENT ILLNESS
[FreeTextEntry1] : Patient is a 77 year old man who presents for follow up. He complains of pain in neck, right arm, travels to right hand. The right arm pain is worse when he lays on his right side. Denies any pain when moving right shoulder/arm. He has had this pain for years, does not feel it is getting worse. Has a history of carpal tunnel on right, had bilateral cortisone injections and wears his wrist splint at night. Saw Dr. Adams in October for right shoulder cortisone injection. He has tried PT for neck in the past, does not feel it helps. He uses a TENS at home. Was seen in May 2021 by Dr. Alexander, tried Gabapentin, was unable to tolerate. MRI of the cervical spine was done one year ago, showing mild to moderate stenosis in the neuroforamen at C3-C4, C4-C5, C6-C7. EMG testing was done in June 2021, showing b/l carpal tunnel syndrome, no evidence for right cervical radiculopathy at C6-7 or C8-T1

## 2022-04-01 NOTE — REVIEW OF SYSTEMS
[Fever] : no fever [Chills] : no chills [Shortness Of Breath] : no shortness of breath [Wheezing] : no wheezing [Cough] : no cough [Nausea] : no nausea [Headache] : no headaches [Dizziness] : no dizziness [Anxiety] : no anxiety

## 2022-04-01 NOTE — ASSESSMENT
[FreeTextEntry1] : 77 year old man with neck pain to right arm, cervical spondylosis, cervical radiculopathy\par patient has been treated for Carpal tunnel, right shoulder pain this year\par continues to have radiating pain, MRI with stenosis, had EMG testing\par medrol dose pack, tylenol as needed for pain\par was previously unable to tolerate gabapentin, does not want to return to PT\par continue TENS at home, daily stretching\par will call patient in one week, may need to prescribe an NSAID after medrol pack\par follow up in two weeks\par patient to call office with any worsening pain, new weakness, numbness \par

## 2022-04-01 NOTE — PHYSICAL EXAM
[Normal] : Oriented to person, place, and time, insight and judgement were intact and the affect was normal [de-identified] : decreased cervical spine ROM  [de-identified] : no respiratory distress  [de-identified] : warm, well perfused  [de-identified] : normal shoulder ROM, negative empty can sign, ulloa, strength 5/5, negative hoffmans, no intrinsic muscle atrophy in the hands, negative carpal compression sign

## 2022-04-14 ENCOUNTER — APPOINTMENT (OUTPATIENT)
Dept: PHYSICAL MEDICINE AND REHAB | Facility: CLINIC | Age: 77
End: 2022-04-14
Payer: MEDICARE

## 2022-04-14 PROCEDURE — 99212 OFFICE O/P EST SF 10 MIN: CPT

## 2022-04-14 NOTE — HISTORY OF PRESENT ILLNESS
[FreeTextEntry1] : Patient is a 77 year old man who presents for follow up. He was last seen two weeks ago with neck pain, chronic on right side, travels to right shoulder, arm, patient completed prednisone taper, then started meloxicam x one week, has been using TENS, stretching on his own at home. Today, he reports pain has improved, only has some occasional numbness in right hand. Denies weakness in upper extremities. He continues to wear wrist splint for carpal tunnel syndrome.

## 2022-04-14 NOTE — ASSESSMENT
[FreeTextEntry1] : 77 year old man h/o carpal tunnel, right shoulder pain with cervical radiculopathy \par improved with medrol dose pack, will taper Meloxicam over next week, then take as needed\par continue TENS, daily stretching\par follow up as needed

## 2022-04-14 NOTE — PHYSICAL EXAM
[Normal] : Oriented to person, place, and time, insight and judgement were intact and the affect was normal [de-identified] : no pain with flexion, extension, good rotation b/l  [de-identified] : no respiratory distress  [de-identified] : warm, well perfused  [de-identified] : normal shoulder ROM, negative empty can sign, ulloa, strength 5/5, negative hoffmans, no intrinsic muscle atrophy in the hands, negative carpal compression sign

## 2022-04-27 ENCOUNTER — RX CHANGE (OUTPATIENT)
Age: 77
End: 2022-04-27

## 2022-04-27 RX ORDER — TROSPIUM CHLORIDE 20 MG/1
20 TABLET, FILM COATED ORAL
Qty: 30 | Refills: 3 | Status: DISCONTINUED | COMMUNITY
Start: 2022-03-31 | End: 2022-04-27

## 2022-05-05 ENCOUNTER — RX RENEWAL (OUTPATIENT)
Age: 77
End: 2022-05-05

## 2022-05-26 ENCOUNTER — APPOINTMENT (OUTPATIENT)
Dept: INTERNAL MEDICINE | Facility: CLINIC | Age: 77
End: 2022-05-26
Payer: MEDICARE

## 2022-05-26 PROCEDURE — 99442: CPT

## 2022-05-26 RX ORDER — METHYLPREDNISOLONE 4 MG/1
4 TABLET ORAL
Qty: 1 | Refills: 0 | Status: DISCONTINUED | COMMUNITY
Start: 2022-04-01 | End: 2022-05-26

## 2022-05-26 NOTE — HISTORY OF PRESENT ILLNESS
[Home] : at home, [unfilled] , at the time of the visit. [Medical Office: ([facility])___] : at the medical office located in  [Verbal consent obtained from patient] : the patient, [unfilled] [FreeTextEntry1] : \par f/u [de-identified] : \par As this is a telemedicine visit, no physical exam could be performed.  Diagnosis and treatment is based on symptoms provided.\par \par 76 yo M pmhx HTN, HLD, CAD s/p stents, preDM, anxiety/depression, FL, glaucoma, BPH, lung nodules for f/u\par \par \par Last seen in office 3/28/22 for f/u.\par \par Feeling well today.\par Needs med refill.\par \par \par Reports is socially distancing and using precautions for covid prevention.\par Denies sick or covid positive contacts.\par Denies fever, chills, cough or sob.\par -hx covid vaccine (Moderna) series - 1/21, 2/21 and booster 8/21\par \par States his adult son is inpt at [facility] since 4/22 after surgery for gallbladder/pancreas surgery- notes had mild complications (leakage) but is stable.\par -visits regularly, finds this stressful but states feels he is coping well\par \par hx anxiety/depression- reports stable mood, denies HI/SI or panic attacks. Notes stress 2/2 son being inpt, feels is coping well\par -using lorazepam few times per week mainly qhs. \par -reports has good social support\par -meditates with help \par \par hx CAD, HTN, HLD-\par -followed by cardio and on med regimen, states last seen 4/22 with prior done labs reviewed (told nl, except mildly elevated glc).  No med changes made.\par -home BPs 117-134/70s, was 124/78 today\par -hx optho eval 2/22- told nl dilated exam and to f/u in 6mo per pt\par \par Reports low fat/salt/carb diet\par Exercising: walks daily 45 mins 2x/wk- done w/o sx's or limitations\par Denies cough, CP, sob, dizziness or GI complaints. \par \par hx right neck pain/numbness with radiation to right shoulder pain and occasional hand tingling, back pain- recently mainly right shoulder issues at night, no pain meds taken regularly. Using OTC topical and TENS.\par -following with PMR, last seen 4/22 and is s/p Medrol course, on meloxicam prn (1-2 x/wk) and using TENs prn\par \par BPH, nocturia- reports stable chronic sx's, denies new  complaints\par -followed by , seen 3/22 with Trospium added (use pending per pt) to Flomax BID.  F/U pending.\par

## 2022-05-26 NOTE — ASSESSMENT
[FreeTextEntry1] : 76 yo M pmhx HTN, HLD, CAD s/p stents, preDM, anxiety/depression, FL, glaucoma, BPH, lung nodules for f/u\par \par \par anxiety/depression- stable mood despite recent stress 2/2 son's health issues, denies acute sx's\par -on lorazepam prn, refilled (iSTOP checked)\par -hx considering Ketamine program for mood that had read about in [place] times- has spoken to his cardiologist who advised against it due to potential cardiac SEs and since he has decided to not pursue it.\par -declines  referral\par -advised to f/u if sx's worsen\par \par CAD hx stents (last 10/20), HTN, HLD- 1/22 Tchol 149  LDL 81 HDL 37; LFTs wnl\par -hx self d/c'd Lovaza 1/14 due to cost (no hx adverse rxn)\par -hx cardiac cath 10/20- noted high grade mid LAD lesion with stent placed, noted patent RCA and OM stents. \par -followed by cardio (Dr. Alcocer, [facility]) - states last seen 4/22 with prior done labs reviewed (told nl, except mildly elevated glc- hx medrol nathaly near that time).  No med changes made.  Asked to forward med records\par -off pravastatin 2/2 diarrhea with use\par -on Crestor, tolerating w/o SEs\par -off Plavix\par -on ASA and ramipril\par -1/22 cmp/TSH, screening UA unremarkable\par \par preDM- 1/22 A1c 5.7 (was 5.7)\par -ADA diet and exercise counseled\par -check A1c nv\par \par hx microscopic hematuria, BPH- stable per pt\par -off myrbetriq 2/2 constipation\par -self d/c'd oxybutynin since  visit as did not like taking it- declines medication use at this time\par -1/22 bmp/PSA/UA/Ucx unremarkable \par -followed by , seen 3/22 with Trospium added (use pending) to Flomax BID.  F/U pending.\par -advised to f/u if sx's worsen, dysuria, hesitancy, etc.\par \par hx fatty liver, colon polyps- asx\par -6/19 screening colonoscopy: +polyps, rec: repeat in 5 yrs\par -9/19 US abdomen: fatty liver, planned to repeat in 1 yr - pt declines repeat imaging.\par -followed by GI, Dr. Cano - had virtual visit 11/21, told c-scope due as advised prior 5 yrs from last in 6/19. Last seen 11/21.\par -1/22 LFTs wnl\par -low fat diet, exercise advised\par \par hx glaucoma, detached retina repair- asx\par -followed by optho. Seen q 6mo. Last 2/22- told stable findings, nl dilated exam and no med changes made per pt.\par -on gtt\par \par hx right neck/shoulder/ left upper back radiculopathy pain, hip pain, hx lumbar stenosis, b/l CTS- \par -hx injection 11/15\par -hx followed for hip/lumbar stenosis at [facility] for special surgery- seen 2/18, s/p home PT on own with improvement\par -followed by ortho, seen 10/21 with shoulder injected\par -following with PMR, last seen 4/22 and is s/p Medrol course, on meloxicam prn (1-2 x/wk) and using TENs prn\par -PT pending, states hesitant as done prior w/o help\par -on Tylenol prn; advised on meloxicam adverse SEs with consistent use (bleeding risk concurrent with ASA use, renal SEs, etc)- advised for periodic bmp monitoring with use\par \par hx b/l hearing loss- stable\par -followed by ENT, seen f/u 2/22 per pt - followed yearly\par -using hearing aides\par -advised to avoid qtips, use OTC wax removing drops with cotton prn\par \par hx left hand pain- dx'd L- de Quervain's and is s/p injection x 2 (last 10/18)- now resolved. hx similar sx's on right s/p injection 3/21 with improvement\par -8/18 xray: STT joint OA \par -followed by hand ortho\par -on Tylenol prn\par \par hx skin rash- hives, unclear etiology, s/p bx by derm 9/17 c/w ?hypersensitivity- resolved \par -followed by derm, seen 2021 per pt with nl FBSE\par -followed by A/I - s/p negative patch testing, also found NOT allergic to PCN\par \par hx former tobacco use, hx abnl screening CT scan 9/19- asx\par -hx > 30 yr 1ppd, quit > 20 yrs \par -9/19 CT lung screening: Few less than 0.5 cm solid nodules are noted in the right middle lobe. \par Few patchy opacities are noted in both upper and right middle lobes. Follow-up CT scan is \par recommended in 3 months to ensure resolution. Lung RADS 2: Category benign appearance, continue yearly screening. (hx doing scan concurrent with URI sx's)\par -1/20 CT lung screening: (c/w 9/19) Near complete resolution of the opacities/consolidations in the right upper lobe and the right middle lobe when compared to previous exam. Stable few small nodules in the right middle lobe when compared to previous exam. Lungs RADS 2: benign, rec: annual screening. Rx for repeat placed (pending)- no declines. States does not wish to have done and pursue surveillance for lung cancer. Risks/benefits discussed.\par -Cont'd smoking cessation advised.\par -advised prompt medical eval if cough, fever, etc.\par \par \par MISC: Continued social distancing and measure for covid19 prevention encouraged. \par -hx covid vaccine (Moderna) series - 1/21, 2/21 and booster 8/21\par \par \par HCM\par -hx CPE 1/22\par -1/22 hep C screening negative\par -hx flu shot 9/21\par -hx PVX 4/11\par -hx prevnar 11/15\par -hx Tdap 7/14\par -hx shingrix series at pharmacy, finished 1/19\par -hx colonoscopy 6/19: colon polyps, rec: repeat in 5 yrs (due 2024) (Dr. Cano)\par -hx nl AAA screening US in 2018 by cardio per pt, declines repeat. Asked to forward record.\par followed by derm, yearly screening advised. Regular use of sun block for skin cancer prevention counseled.\par -HCP: Alex Barboza, [phone] and sonChip, [phone] (copy for chart 2/7/22)\par \par Pt's cell: [phone]\par \par Pt to f/u in 2mo for cont'd care, sooner as needed.\par

## 2022-07-05 ENCOUNTER — APPOINTMENT (OUTPATIENT)
Dept: ORTHOPEDIC SURGERY | Facility: CLINIC | Age: 77
End: 2022-07-05

## 2022-07-05 VITALS — SYSTOLIC BLOOD PRESSURE: 130 MMHG | HEART RATE: 78 BPM | DIASTOLIC BLOOD PRESSURE: 74 MMHG

## 2022-07-05 PROCEDURE — 99214 OFFICE O/P EST MOD 30 MIN: CPT | Mod: 25

## 2022-07-05 PROCEDURE — 20550 NJX 1 TENDON SHEATH/LIGAMENT: CPT | Mod: LT

## 2022-07-06 NOTE — PROCEDURE
[de-identified] : Ultrasound Guided Injection \par Indication: Ensure placement within a superficial tendon sheath without damage to the tendon or placement of the steroid solution subcutaneously\par \par Utlizing the [employer] portable ultrasound machine, the Linear 25mm 10-5 MHz transducer, sterile probe cover and sterile ultrasound gel, ultrasound guidance with the probe in the transverse axis, utilizing an out-of-plane approach, was used for the following injection:\par \par \par Injection: left wrist.\par Indication: De Quervain's tenosynovitis.\par \par A discussion was had with the patient regarding this procedure and all questions were answered. All risks, benefits and alternatives were discussed. These included but were not limited to bleeding, infection, and allergic reaction. A timeout was performed prior to the procedure to ensure proper side.  Alcohol was used to clean and sterilize the skin over the radial aspect of the wrist overlying the first extensor tendon compartment encompassing the APL and EPB. A 25-gauge needle was used to inject 0.5cc of 0.5% marcaine and 1cc of 6mg/mL betamethasone into the first extensor compartment. A sterile bandage was then applied. The patient tolerated the procedure well and there were no complications. \par \par

## 2022-07-06 NOTE — PHYSICAL EXAM
[de-identified] : Constitutional: Well-nourished, well-developed, No acute distress\par Respiratory:  Good respiratory effort, no SOB\par Lymphatic: No regional lymphadenopathy, no lymphedema\par Psychiatric: Pleasant and normal affect, alert and oriented x3\par Skin: Clean dry and intact B/L UE/LE\par Musculoskeletal: normal except where as noted in regional exam\par \par left hand\par APPEARANCE: no marked deformities, no swelling or malalignment\par POSITIVE TENDERNESS: APL, EPB\par ROM: full & painless in CMC, MCP, and IP joints. \par RESISTIVE TESTING:+ finkelsteins\par Vasc: 2+ radial pulse, normal capillary refill\par Neuro: decreased sensation palmar digits 1-3\par APB 4/5 bilaterally\par ADM/FDI 5/5\par wrist extensors and finger flexors 5/5 bilaterally\par + tinels at carpal tunnel and cubital tunnel\par AIN, PIN intact

## 2022-07-06 NOTE — HISTORY OF PRESENT ILLNESS
[de-identified] : Patient reports no significant changes since his last visit. Reports the injection helped for a few weeks but the pain gradually returned. Continues to c/o of sharp shooting pain in the wrist and thumb and has difficulty holding and carrying objects. Denies using any OTC medication or ice/heat pack for pain.

## 2022-07-06 NOTE — DISCUSSION/SUMMARY
[de-identified] : Discussed findings of today's exam and possible causes of patient's pain.  Educated patient on their most probable diagnosis of [default value] wrist De Quervain's tenosynovitis.  Reviewed possible courses of treatment, and we collaboratively decided best course of treatment at this time will include cortisone injection today (see procedure note).  Informed the patient that the numbing medicine in today's injection will last for about 4-6 hours. The steroid that was injected will start to work in 1 to 2 days, peak at 1-2 weeks, and may last up to 4-6 weeks. Discussed with the patient the expected course of this injury, and the variable response to cortisone injections. Sometimes it is relieved with a single injection, while others require repeat injections.  We will wait and see how patient responds to this one cortisone injection, may consider repeat injection in 6 weeks if issue is improved but not fully resolved. Patient may consider consultation with hand/wrist surgeon to have a discussion about repeat cortisone injections, versus possible surgical intervention.  Patient advised to wear a thumb spica splint every evening and as tolerate during the day.  Based on the patient's symptoms today, no hand/wrist therapy will likely be needed.  Patient appreciates and agrees with the plan\par \par Adela Adams MD, EdM\par [facility]\par Department of Orthopedics

## 2022-07-26 ENCOUNTER — APPOINTMENT (OUTPATIENT)
Dept: INTERNAL MEDICINE | Facility: CLINIC | Age: 77
End: 2022-07-26

## 2022-07-26 ENCOUNTER — NON-APPOINTMENT (OUTPATIENT)
Age: 77
End: 2022-07-26

## 2022-07-26 VITALS
DIASTOLIC BLOOD PRESSURE: 62 MMHG | WEIGHT: 154 LBS | TEMPERATURE: 97.7 F | RESPIRATION RATE: 17 BRPM | OXYGEN SATURATION: 97 % | BODY MASS INDEX: 24.75 KG/M2 | SYSTOLIC BLOOD PRESSURE: 120 MMHG | HEIGHT: 66 IN | HEART RATE: 70 BPM

## 2022-07-26 DIAGNOSIS — M65.4 RADIAL STYLOID TENOSYNOVITIS [DE QUERVAIN]: ICD-10-CM

## 2022-07-26 PROCEDURE — 93000 ELECTROCARDIOGRAM COMPLETE: CPT | Mod: 59

## 2022-07-26 PROCEDURE — 99214 OFFICE O/P EST MOD 30 MIN: CPT | Mod: 25

## 2022-07-26 RX ORDER — MELOXICAM 7.5 MG/1
7.5 TABLET ORAL
Qty: 30 | Refills: 0 | Status: DISCONTINUED | COMMUNITY
Start: 2022-04-08 | End: 2022-07-26

## 2022-07-26 NOTE — HISTORY OF PRESENT ILLNESS
[FreeTextEntry1] : \par f/u [de-identified] : \par 76 yo M pmhx HTN, HLD, CAD s/p stents, preDM, anxiety/depression, FL, glaucoma, BPH, lung nodules for f/u\par \par \par Feeling well today.\par Needs med refill.\par \par Reports is socially distancing and using precautions for covid prevention.\par Denies sick or covid positive contacts.\par Denies fever, chills, cough or sob.\par -hx covid vaccine (Moderna) series - 1/21, 2/21 and booster 8/21\par \par States his adult son is currently staying with him x 3 weeks now since recent inpt stay at [facility] since 4/22 after surgery for gallbladder/pancreas surgery- notes had mild complications (leakage) and inpt stay.  States all going well.  States son has been sober since 4/22.\par \par hx anxiety/depression- reports stable mood, denies HI/SI or panic attacks. \par -using lorazepam 3-4x per week mainly qhs. \par -reports has good social support\par \par hx CAD, HTN, HLD-\par -followed by cardio and on med regimen, states last seen 4/22 with prior done labs reviewed (told nl).  No med changes made.  Has f/u 9/22.\par -home BP: /57-66 notes device detected afib x2 (was asx at time)- plans to d/w cardio soon\par -hx optho eval 2/22- told nl dilated exam and to f/u in 6 mo per pt.  \par \par Feels has lost few lbs since last visit with healthy diet and exercise\par Reports low fat/salt/carb diet\par Exercising: walks daily 45 mins 2x/wk- done w/o sx's or limitations\par Denies cough, CP, sob, dizziness or GI complaints. \par \par hx right neck pain/numbness with radiation to right shoulder pain and occasional hand tingling, back pain- recently mainly right shoulder issues at night, no pain meds taken regularly. Using OTC topical and TENS.\par -following with PMR, last seen 4/22 and is s/p Medrol course, on meloxicam prn (1-2 x/wk) and using TENs prn\par \par hx left De Quervain's - followed by ortho, seen 7/22 with repeat injection given with some help noted\par \par BPH, nocturia- reports stable chronic sx's, denies new  complaints\par -followed by , seen 3/22 with Trospium added (use pending per pt as feels is not needed) to Flomax BID.  F/U pending.\par

## 2022-07-26 NOTE — PHYSICAL EXAM
[General Appearance - Alert] : alert [General Appearance - In No Acute Distress] : in no acute distress [General Appearance - Well-Appearing] : healthy appearing [Sclera] : the sclera and conjunctiva were normal [PERRL With Normal Accommodation] : pupils were equal in size, round, and reactive to light [Extraocular Movements] : extraocular movements were intact [Outer Ear] : the ears and nose were normal in appearance [Nasal Cavity] : the nasal mucosa and septum were normal [Oropharynx] : the oropharynx was normal [Neck Appearance] : the appearance of the neck was normal [Thyroid Diffuse Enlargement] : the thyroid was not enlarged [Respiration, Rhythm And Depth] : normal respiratory rhythm and effort [Auscultation Breath Sounds / Voice Sounds] : lungs were clear to auscultation bilaterally [Heart Rate And Rhythm] : heart rate was normal and rhythm regular [Heart Sounds] : normal S1 and S2 [Murmurs] : no murmurs [Arterial Pulses Carotid] : carotid pulses were normal with no bruits [Full Pulse] : the pedal pulses are present [Edema] : there was no peripheral edema [Bowel Sounds] : normal bowel sounds [Abdomen Soft] : soft [Abdomen Tenderness] : non-tender [Cervical Lymph Nodes Enlarged Posterior Bilaterally] : posterior cervical [Cervical Lymph Nodes Enlarged Anterior Bilaterally] : anterior cervical [Supraclavicular Lymph Nodes Enlarged Bilaterally] : supraclavicular [No CVA Tenderness] : no ~M costovertebral angle tenderness [No Spinal Tenderness] : no spinal tenderness [Abnormal Walk] : normal gait [Musculoskeletal - Swelling] : no joint swelling seen [] : no rash [No Focal Deficits] : no focal deficits [Oriented To Time, Place, And Person] : oriented to person, place, and time [Affect] : the affect was normal [Mood] : the mood was normal [FreeTextEntry1] : scattered freckles

## 2022-07-26 NOTE — ASSESSMENT
[FreeTextEntry1] : \par 76 yo M pmhx HTN, HLD, CAD s/p stents, preDM, anxiety/depression, FL, glaucoma, BPH, lung nodules for f/u\par \par anxiety/depression- stable mood despite recent stress 2/2 son's health issues, denies acute sx's\par -on lorazepam prn, refilled (iSTOP checked)\par -hx considering Ketamine program for mood that had read about in [place] times- has spoken to his cardiologist who advised against it due to potential cardiac SEs and since he has decided to not pursue it.\par -declines  referral\par -advised to f/u if sx's worsen\par \par CAD hx stents (last 10/20), HTN, HLD- 4/22 Tchol 128  HDL 42 LDL 64; LFTs wnl.  Reports ? afib detected with home BP monitor.  Asx.\par -EKG today: NSR @ 69 bpm, no acute pathology\par -hx self d/c'd Lovaza 1/14 due to cost (no hx adverse rxn)\par -hx cardiac cath 10/20- noted high grade mid LAD lesion with stent placed, noted patent RCA and OM stents. \par -followed by cardio (Dr. Alcocer, [facility]) - states last seen 4/22 with prior done labs reviewed.  No med changes made.  F/u pending 9/22.  Plans to f/u soon re: ? afib on home BP device- strongly advised.  Asked to forward records.  \par -off pravastatin 2/2 diarrhea with use\par -on Crestor, tolerating w/o SEs\par -off Plavix\par -on ASA and ramipril\par -1/22 cmp/TSH, screening UA unremarkable\par -4/22 cmp wnl\par -advised of prompt medical eval need if sx onset (ie dizziness, CP, palpitations, sob, etc)\par -declines repeat labs today, defers to cardio visit\par \par preDM- 1/22 A1c 5.7 (was 5.7)\par -ADA diet and exercise counseled\par -declines check A1c - defers to CPE\par \par hx microscopic hematuria, BPH- stable per pt\par -off myrbetriq 2/2 constipation\par -self d/c'd oxybutynin since  visit as did not like taking it- declines medication use at this time\par -1/22 bmp/PSA/UA/Ucx unremarkable \par -followed by , seen 3/22 with Trospium added (use pending) to Flomax BID.  F/U pending.\par -advised to f/u if sx's worsen, dysuria, hesitancy, etc.\par \par hx fatty liver, colon polyps- asx\par -6/19 screening colonoscopy: +polyps, rec: repeat in 5 yrs\par -9/19 US abdomen: fatty liver, planned to repeat in 1 yr - pt declines repeat imaging.\par -followed by GI, Dr. Cano - had virtual visit 11/21, told c-scope due as advised prior 5 yrs from last in 6/19. Last seen 11/21.\par -4/22 LFTs wnl\par -low fat diet, exercise advised\par \par hx glaucoma, detached retina repair- asx\par -followed by optho. Seen q 6mo. Last 2/22- told stable findings, nl dilated exam and no med changes made per pt.  Has f/u 8/22.\par -on gtt\par \par hx right neck/shoulder/ left upper back radiculopathy pain, hip pain, hx lumbar stenosis, b/l CTS- \par -hx injection 11/15\par -hx followed for hip/lumbar stenosis at [facility]- seen 2/18, s/p home PT on own with improvement\par -followed by ortho, seen 10/21 with shoulder injected\par -following with PMR, last seen 4/22 and is s/p Medrol course, on meloxicam prn (1-2 x/wk) and using TENs prn\par -PT pending, states hesitant as done prior w/o help\par -on Tylenol prn; advised on meloxicam adverse SEs with consistent use (bleeding risk concurrent with ASA use, renal SEs, etc)- advised for periodic bmp monitoring with use\par \par hx b/l hearing loss- stable\par -followed by ENT, seen f/u 2/22 per pt - followed yearly\par -using hearing aides\par -advised to avoid qtips, use OTC wax removing drops with cotton prn\par \par hx left hand pain- dx'd de Quervain's and is s/p injection x 2 (last 10/18)- s/p injection 7/22 with improvement\par -followed by hand ortho\par -on Tylenol prn\par \par hx skin rash- hives, unclear etiology, s/p bx by derm 9/17 c/w ?hypersensitivity- resolved \par -followed by derm, seen 2021 per pt with nl FBSE\par -followed by A/I - s/p negative patch testing, also found NOT allergic to PCN\par \par hx former tobacco use, hx abnl screening CT scan 9/19- asx\par -hx > 30 yr 1ppd, quit > 20 yrs \par -9/19 CT lung screening: Few less than 0.5 cm solid nodules are noted in the right middle lobe. \par Few patchy opacities are noted in both upper and right middle lobes. Follow-up CT scan is \par recommended in 3 months to ensure resolution. Lung RADS 2: Category benign appearance, continue yearly screening. (hx doing scan concurrent with URI sx's)\par -1/20 CT lung screening: (c/w 9/19) Near complete resolution of the opacities/consolidations in the right upper lobe and the right middle lobe when compared to previous exam. Stable few small nodules in the right middle lobe when compared to previous exam. Lungs RADS 2: benign, rec: annual screening. Rx for repeat placed (pending)- no declines. States does not wish to have done and pursue surveillance for lung cancer. Risks/benefits discussed.\par -Cont'd smoking cessation advised.\par -advised prompt medical eval if cough, fever, etc.\par \par \par MISC: Continued social distancing and measure for covid19 prevention encouraged. \par -hx covid vaccine (Moderna) series - 1/21, 2/21 and booster 8/21\par \par \par HCM\par -hx CPE 1/22\par -1/22 hep C screening negative\par -hx flu shot 9/21\par -hx PVX 4/11\par -hx prevnar 11/15\par -hx Tdap 7/14\par -hx shingrix series at pharmacy, finished 1/19\par -hx colonoscopy 6/19: colon polyps, rec: repeat in 5 yrs (due 2024) (Dr. Cano)\par -hx nl AAA screening US in 2018 by cardio per pt, declines repeat. Asked to forward record.\par -followed by derm, yearly screening advised. Regular use of sun block for skin cancer prevention counseled.\par -HCP: Alex Tamra, [phone] and sonChip, [phone] (copy for chart 2/7/22)\par \par Pt's cell: [phone]\par \par

## 2022-07-26 NOTE — DATA REVIEWED
[FreeTextEntry1] : outside lab - to be scanned into chart\par \par 4/29/22\par cmp wnl\par Tchol 128  HDL 42 LDL 64

## 2022-09-08 ENCOUNTER — APPOINTMENT (OUTPATIENT)
Dept: INTERNAL MEDICINE | Facility: CLINIC | Age: 77
End: 2022-09-08

## 2022-09-08 VITALS
TEMPERATURE: 98.3 F | WEIGHT: 157 LBS | HEART RATE: 81 BPM | OXYGEN SATURATION: 97 % | BODY MASS INDEX: 25.23 KG/M2 | DIASTOLIC BLOOD PRESSURE: 72 MMHG | RESPIRATION RATE: 18 BRPM | SYSTOLIC BLOOD PRESSURE: 127 MMHG | HEIGHT: 66 IN

## 2022-09-08 PROCEDURE — 99213 OFFICE O/P EST LOW 20 MIN: CPT | Mod: 25

## 2022-09-08 PROCEDURE — G0008: CPT

## 2022-09-08 PROCEDURE — 90662 IIV NO PRSV INCREASED AG IM: CPT

## 2022-09-08 NOTE — HISTORY OF PRESENT ILLNESS
[FreeTextEntry1] : \par f/u [de-identified] : \par 78 yo M pmhx HTN, HLD, CAD s/p stents, preDM, anxiety/depression, FL, glaucoma, BPH, lung nodules for f/u\par \par \par Feeling well today.\par Needs med refill.\par \par Reports is socially distancing and using precautions for covid prevention.\par Denies sick or covid positive contacts.\par Denies fever, chills, cough or sob.\par -hx covid vaccine (Moderna) series - 1/21, 2/21 and booster 8/21\par \par States his adult son was staying with him s/p recent inpt stay at [facility] since 4/22 after surgery for gallbladder/pancreas surgery- notes had mild complications (leakage) and inpt stay.  States all going well.  States son has been sober since 4/22.\par \par hx anxiety/depression- reports stable mood, denies HI/SI or panic attacks. \par -using lorazepam 3x per week mainly qhs. \par -reports has good social support\par \par hx CAD, HTN, HLD-\par -followed by cardio and on med regimen, states last seen 4/22 with prior done labs reviewed (told nl).  No med changes made.  Has f/u 9/22 with labs planned then- plans to forward results.\par -home BP: /57-72 notes device detected afib x2 (was asx at time)- plans to d/w cardio soon.  No recurrence since.\par -hx optho eval 8/22- told nl dilated exam and to f/u in 6 mo per pt.  \par -denies dizziness, CP, palpitations or sob\par \par Reports low fat/salt/carb diet\par Exercising: walks daily 45 mins 2x/wk- done w/o sx's or limitations\par Denies cough, CP, sob, dizziness or GI complaints. \par \par hx right neck pain/numbness with radiation to right shoulder pain and occasional hand tingling, back pain- recently mainly right shoulder issues at night, no pain meds taken regularly. Using OTC topical and TENS.\par -following with PMR, last seen 4/22 and is s/p Medrol course, on meloxicam prn (no recent use) and using TENs prn.  F/u pending.\par \par hx left De Quervain's - followed by ortho, seen 7/22 with repeat injection given with some help noted\par \par BPH, nocturia- reports stable chronic sx's, denies new  complaints\par -followed by , seen 3/22 with Trospium added (use pending per pt as feels is not needed) to Flomax BID.  F/U pending.\par

## 2022-09-08 NOTE — PHYSICAL EXAM
[General Appearance - Alert] : alert [General Appearance - In No Acute Distress] : in no acute distress [General Appearance - Well-Appearing] : healthy appearing [Sclera] : the sclera and conjunctiva were normal [PERRL With Normal Accommodation] : pupils were equal in size, round, and reactive to light [Extraocular Movements] : extraocular movements were intact [Outer Ear] : the ears and nose were normal in appearance [Nasal Cavity] : the nasal mucosa and septum were normal [Oropharynx] : the oropharynx was normal [Neck Appearance] : the appearance of the neck was normal [Thyroid Diffuse Enlargement] : the thyroid was not enlarged [Respiration, Rhythm And Depth] : normal respiratory rhythm and effort [Auscultation Breath Sounds / Voice Sounds] : lungs were clear to auscultation bilaterally [Heart Rate And Rhythm] : heart rate was normal and rhythm regular [Heart Sounds] : normal S1 and S2 [Murmurs] : no murmurs [Arterial Pulses Carotid] : carotid pulses were normal with no bruits [Full Pulse] : the pedal pulses are present [Edema] : there was no peripheral edema [Bowel Sounds] : normal bowel sounds [Abdomen Soft] : soft [Abdomen Tenderness] : non-tender [Cervical Lymph Nodes Enlarged Posterior Bilaterally] : posterior cervical [Cervical Lymph Nodes Enlarged Anterior Bilaterally] : anterior cervical [Supraclavicular Lymph Nodes Enlarged Bilaterally] : supraclavicular [No CVA Tenderness] : no ~M costovertebral angle tenderness [No Spinal Tenderness] : no spinal tenderness [] : no rash [No Focal Deficits] : no focal deficits [Oriented To Time, Place, And Person] : oriented to person, place, and time [Affect] : the affect was normal [Mood] : the mood was normal [FreeTextEntry1] : scattered freckles

## 2022-09-08 NOTE — ASSESSMENT
[FreeTextEntry1] : \par 78 yo M pmhx HTN, HLD, CAD s/p stents, preDM, anxiety/depression, FL, glaucoma, BPH, lung nodules for f/u\par \par anxiety/depression- stable mood despite recent stress 2/2 son's health issues, denies acute sx's\par -on lorazepam prn, refilled (iSTOP checked)\par -hx considering Ketamine program for mood that had read about in [place] times- has spoken to his cardiologist who advised against it due to potential cardiac SEs and since he has decided to not pursue it.\par -declines  referral\par -advised to f/u if sx's worsen\par \par CAD hx stents (last 10/20), HTN, HLD- 4/22 Tchol 128  HDL 42 LDL 64; LFTs wnl.  Reports ? afib detected with home BP monitor.  Asx.\par -7/22 EKG: NSR @ 69 bpm, no acute pathology\par -hx self d/c'd Lovaza 1/14 due to cost (no hx adverse rxn)\par -hx cardiac cath 10/20- noted high grade mid LAD lesion with stent placed, noted patent RCA and OM stents. \par -followed by cardio (Dr. Alcocer, [facility]) - states last seen 4/22 with prior done labs reviewed.  No med changes made.  F/u pending 9/2 with labs planned then (declines today) and will forward results.  Plans to f/u soon re: ? afib on home BP device- strongly advised.  Asked to forward records.  \par -off pravastatin 2/2 diarrhea with use\par -on Crestor, tolerating w/o SEs\par -off Plavix\par -on ASA and ramipril\par -1/22 cmp/TSH, screening UA unremarkable\par -4/22 cmp wnl \par -advised of prompt medical eval need if sx onset (ie dizziness, CP, palpitations, sob, etc)\par -declines repeat labs today, defers to cardio visit\par \par preDM- 1/22 A1c 5.7 (was 5.7)\par -ADA diet and exercise counseled\par -declines check A1c - defers to CPE\par \par hx microscopic hematuria, BPH- stable per pt\par -off myrbetriq 2/2 constipation\par -self d/c'd oxybutynin since  visit as did not like taking it- declines medication use at this time\par -1/22 bmp/PSA/UA/Ucx unremarkable \par -followed by , seen 3/22 with Trospium added (use pending) to Flomax BID.  F/U pending.\par -advised to f/u if sx's worsen, dysuria, hesitancy, etc.\par \par hx fatty liver, colon polyps- asx\par -6/19 screening colonoscopy: +polyps, rec: repeat in 5 yrs\par -9/19 US abdomen: fatty liver, planned to repeat in 1 yr - pt declines repeat imaging.\par -followed by GI, Dr. Cano - had virtual visit 11/21, told c-scope due as advised prior 5 yrs from last in 6/19. Last seen 11/21.\par -4/22 LFTs wnl\par -low fat diet, exercise advised\par \par hx glaucoma, detached retina repair- asx\par -followed by optho. Seen q 6mo. Last 8/22- told stable findings, nl dilated exam and no med changes made per pt.  To f/u in 6mo.\par -on gtt\par \par hx right neck/shoulder/ left upper back radiculopathy pain, hip pain, hx lumbar stenosis, b/l CTS- \par -hx injection 11/15\par -[facility]18, s/p home PT on own with improvement\par -followed by ortho, seen 10/21 with shoulder injected\par -following with PMR, last seen 4/22 and is s/p Medrol course, on meloxicam prn (1-2 x/wk) and using TENs prn\par -PT pending, states hesitant as done prior w/o help\par -on Tylenol prn; advised on meloxicam adverse SEs with consistent use (bleeding risk concurrent with ASA use, renal SEs, etc)- advised for periodic bmp monitoring with use\par \par hx b/l hearing loss- stable\par -followed by ENT, seen f/u 2/22 per pt - followed yearly\par -using hearing aides\par -advised to avoid qtips, use OTC wax removing drops with cotton prn\par \par hx left hand pain- dx'd de Quervain's and is s/p injection x 2 (last 10/18)- s/p injection 7/22 with improvement\par -followed by hand ortho\par -on Tylenol prn\par \par hx skin rash- hives, unclear etiology, s/p bx by derm 9/17 c/w ?hypersensitivity- resolved \par -followed by derm, seen 2021 per pt with nl FBSE\par -followed by A/I - s/p negative patch testing, also found NOT allergic to PCN\par \par hx former tobacco use, hx abnl screening CT scan 9/19- asx\par -hx > 30 yr 1ppd, quit > 20 yrs \par -9/19 CT lung screening: Few less than 0.5 cm solid nodules are noted in the right middle lobe. \par Few patchy opacities are noted in both upper and right middle lobes. Follow-up CT scan is \par recommended in 3 months to ensure resolution. Lung RADS 2: Category benign appearance, continue yearly screening. (hx doing scan concurrent with URI sx's)\par -1/20 CT lung screening: (c/w 9/19) Near complete resolution of the opacities/consolidations in the right upper lobe and the right middle lobe when compared to previous exam. Stable few small nodules in the right middle lobe when compared to previous exam. Lungs RADS 2: benign, rec: annual screening. Rx for repeat placed (pending)- no declines. States does not wish to have done and pursue surveillance for lung cancer. Risks/benefits discussed.\par -Cont'd smoking cessation advised.\par -advised prompt medical eval if cough, fever, etc.\par \par \par MISC: Continued social distancing and measure for covid19 prevention encouraged. \par -hx covid vaccine (Moderna) series - 1/21, 2/21 and booster 8/21\par \par \par HCM\par -hx CPE 1/22\par -1/22 hep C screening negative\par -flu shot today\par -hx PVX 4/11\par -hx prevnar 11/15\par -hx Tdap 7/14\par -hx shingrix series at pharmacy, finished 1/19\par -hx colonoscopy 6/19: colon polyps, rec: repeat in 5 yrs (due 2024) (Dr. Cano)\par -hx nl AAA screening US in 2018 by cardio per pt, declines repeat. Asked to forward record.\par -followed by derm, yearly screening advised. Regular use of sun block for skin cancer prevention counseled.\par -HCP: Alex Jonathanmarcelo, [phone] and sonChip, [phone] (copy for chart 2/7/22)\par \par Pt's cell: [phone]\par \par

## 2022-09-21 ENCOUNTER — APPOINTMENT (OUTPATIENT)
Dept: PHYSICAL MEDICINE AND REHAB | Facility: CLINIC | Age: 77
End: 2022-09-21

## 2022-09-21 VITALS — HEART RATE: 73 BPM | OXYGEN SATURATION: 97 % | DIASTOLIC BLOOD PRESSURE: 78 MMHG | SYSTOLIC BLOOD PRESSURE: 132 MMHG

## 2022-09-21 DIAGNOSIS — M54.12 RADICULOPATHY, CERVICAL REGION: ICD-10-CM

## 2022-09-21 PROCEDURE — 99214 OFFICE O/P EST MOD 30 MIN: CPT

## 2022-09-21 NOTE — HISTORY OF PRESENT ILLNESS
[FreeTextEntry1] : Patient is a 77 year old man who presents for follow up. He was last seen in April with right sided neck pain, improved with medrol pack, stretching. Today, the patient reports his neck pain has returned, to right upper back, shoulder, had right wrist injection in July for de Quervain's tenosynovitis. Takes tylenol PM at night as needed, not taking any other medications for pain, stopped using TENS. He denies weakness in upper extremities, numbness, tingling, pain is worse when laying on right side. MRI cervical spine March 2021 with cervical spondylosis, C6/C7, right foraminal narrowing at C3/C4 and C4/C5.

## 2022-09-21 NOTE — PHYSICAL EXAM
[Normal] : Oriented to person, place, and time, insight and judgement were intact and the affect was normal [de-identified] : decreased cervical spine ROM, limited extension, lateral bending  [de-identified] : no respiratory distress  [de-identified] : warm, well perfused  [de-identified] : limited right shoulder ROM, pain with int/external rotation, no deltoid bursa tenderness, strength 5/5, negative hoffmans, no intrinsic muscle atrophy in the hands, negative carpal compression sign

## 2022-09-21 NOTE — ASSESSMENT
[FreeTextEntry1] : 77 year old man with neck pain to right upper back, arm\par MRI with cervical spondylosis\par previous noted reviewed\par relief with medrol pack in April, pain has returned\par may benefit from injection, will refer to Dr. Siegel\par can take tylenol twice daily for pain, meloxicam as needed \par continue daily stretching, with heat\par follow up in one month, prn

## 2022-10-06 ENCOUNTER — APPOINTMENT (OUTPATIENT)
Dept: PHYSICAL MEDICINE AND REHAB | Facility: CLINIC | Age: 77
End: 2022-10-06

## 2022-10-06 VITALS — HEART RATE: 77 BPM | SYSTOLIC BLOOD PRESSURE: 125 MMHG | OXYGEN SATURATION: 97 % | DIASTOLIC BLOOD PRESSURE: 75 MMHG

## 2022-10-06 DIAGNOSIS — M75.01 ADHESIVE CAPSULITIS OF RIGHT SHOULDER: ICD-10-CM

## 2022-10-06 PROCEDURE — 99213 OFFICE O/P EST LOW 20 MIN: CPT

## 2022-10-06 NOTE — PHYSICAL EXAM
[FreeTextEntry1] : General exam \par \par Constitutional: The patient appears well-developed, well-nourished, and in no apparent distress. Patient is well-groomed.  \par \par Skin: The skin is warm and dry, with normal turgor.  No rashes or lesions are noted.  \par \par Eyes: PERRL.  \par \par ENMT: Ears: Hearing is grossly within normal limits.  \par \par Neck: Supple: The neck is supple.  \par \par Respiratory: Inspection: Breathing unlabored.  \par \par Neurologic: Alert and oriented x 3. \par \par Psychiatric: Patient is cooperative and appropriate.  Mood and affect are normal.  Patient's insight is good, and memory and judgment are intact.\par \par CERVICAL EXAM\par \par APPEARANCE:\par No visible scars\par Left upper shoulder posterior area with bandage in place from recent basal cell carcinoma resection\par No gross deformity or malalignment\par No erythema, swelling or ecchymosis\par Normal temperature to touch\par Normal cervical lordosis\par No muscle atrophy of the left upper extremity\par No muscle atrophy of the right upper extremity\par \par TENDERNESS:\par +trigger points over bilateral trapezius muscles\par Absent over midline spinous processes\par Absent over left cervical facet joints\par Absent over right cervical facet joints \par +over left cervical paraspinal muscles and Trapezius \par +over right cervical paraspinal muscles and Trapezius\par \par ROM:\par Limited AROM of the cervical spine\par LImited PROM of the cervical spine in all directions\par Limited PROM of the RIGHT shoulder in all directions\par No pain with flexion, extension of the cervical spine\par No pain with left side bending\par No pain with right side bending\par No pain with left rotation\par No pain with right rotation\par \par SPECIAL TESTS:\par +left Spurling test\par +right Spurling test\par Negative Lhermitte´s sign\par Normal cervical compression test\par \par SENSORY TESTING:\par Intact to light touch Left C3-T2\par Intact to light touch Right C3-T2\par \par MOTOR TESTING:\par Muscle tone of the left upper extremity is normal\par Muscle tone of the right upper extremity is normal\par \par Hand  strength Left 5/5\par Hand  strength Right 4/5\par \par Finger abductor strength Left 5/5\par Finger abductor strength Right 5/5\par \par Elbow flexion strength Left 5/5\par Elbow flexion strength Right 5/5\par \par Elbow extension strength Left 5/5\par Elbow extension strength Right 5/5\par \par Shoulder flexion strength Left 5/5\par Shoulder flexion strength Right 5/5\par \par Shoulder extension strength Left 5/5\par Shoulder extension strength Right 5/5\par \par Shoulder abduction strength Left 5/5\par Shoulder abduction strength Right 5/5\par \par REFLEXES:\par Ortiz sign left negative\par Ortiz sign right negative\par \par Biceps (C5) left 1+\par Biceps (C5) right 1+\par \par GAIT:\par Non-antalgic gait\par Balance normal with ambulation

## 2022-10-06 NOTE — ASSESSMENT
[FreeTextEntry1] : 77-year-old male with chronic neck pains due to cervical radiculopathy and cervical spondylosis without myelopathy.  Patient has significant pain and radiating symptoms along the right arm with concordant MRI findings.  Patient also has right shoulder pain and adhesive capsulitis.\par \par Patient reassured and educated on the diagnosis and treatment options.\par \par MRI cervical spine reviewed\par \par Clinical notes from Dr. Purvis from September 21, 2022 reviewed\par \par Demonstrated patient home exercises for right shoulder range of motion improvement and counseled patient to incorporate them into his every day morning routine.  Patient expressed agreement with this.\par \par Patient is taking blood thinners at this time. Risks and benefits of having injection while on blood thinners was explained to the patient. Risks discussed included, but not limited to: pain, infection, headaches, bleeding, paralysis and damage to vital organs requiring emergency surgery. Patient will need to hold ASA for 7 days prior to coming in for the procedure.\par \par Patient had tried and failed conservative treatment. This includes but is not limited to: Acetaminophen, NSAIDs, muscle relaxants, neuropathic medications, physician directed home exercises and/or physical therapy for at least 8 weeks. After a thorough discussion of risks and benefits patient would like to proceed with cervical epidural steroid injection for scopic guidance at C7-T1. \par \par Based on the history, physical exam and diagnostic findings, patient will benefit from this procedure. The goal of this procedure is to reduce pain and inflammation, improve function and range of motion and to further promote increased activity and return to previous level of functioning. The ultimate goal of performing this procedure is to improve patient's quality of life.\par \par Asking for authorization for cervical epidural steroid injection for scopic guidance at C7-T1 to help treat patient´s pain.  Patient will be scheduled for this procedure.\par \par

## 2022-10-06 NOTE — HISTORY OF PRESENT ILLNESS
[FreeTextEntry1] : DUC SOTELO is a pleasant 77-year-old ambidextrous male here for initial consultation with me for neck pains.  Patient was sent by my colleague Dr. Purvis for possible cervical epidural steroid injection.  Patient has cervical MRI done which showed disc herniations and facet arthropathy with stenosis.\par \par Pain location: Neck\par Quality: Shooting\par Radiation: Right arm into the thumb and index finger\par Severity: 6 out of 10 in office today but can be up to an 8 out of 10 at its worst\par Onset: Many years ago with frequent exacerbations\par Associated symptoms: Muscle spasms and tingling\par Numbness: Symptoms present in the hand\par Weakness: Denies\par Exacerbated by: Positional changing, sleeping\par Improved by: Rest and over-the-counter pain medications\par Bowel or bladder involvement: Denies\par \par Denies bowel/bladder dysfunction, saddle anesthesia, fevers, chills, weight loss, night pain, or night sweats at this time.\par \par The pain interferes with function, ADLs and quality of life.\par Patient had tried Acetaminophen, NSAIDs, prescription pain medications, muscle relaxants without any lasting relief of pain.\par \par Patient had imaging studies to evaluate the pain. \par Patient had not had any nerve conduction studies to evaluate the symptoms. \par Patient had tried physical therapy, physician directed home exercises, stretching, lifestyle modification for over 6 weeks without any significant relief.

## 2022-10-06 NOTE — REVIEW OF SYSTEMS
[Negative] : Neurological [FreeTextEntry9] : neck pain [de-identified] : left shoulder basal cell carcinoma removal recently

## 2022-10-18 ENCOUNTER — LABORATORY RESULT (OUTPATIENT)
Age: 77
End: 2022-10-18

## 2022-10-21 ENCOUNTER — NON-APPOINTMENT (OUTPATIENT)
Age: 77
End: 2022-10-21

## 2022-10-21 ENCOUNTER — APPOINTMENT (OUTPATIENT)
Dept: PHYSICAL MEDICINE AND REHAB | Facility: CLINIC | Age: 77
End: 2022-10-21

## 2022-10-26 ENCOUNTER — LABORATORY RESULT (OUTPATIENT)
Age: 77
End: 2022-10-26

## 2022-11-02 LAB — SARS-COV-2 N GENE NPH QL NAA+PROBE: NOT DETECTED

## 2022-11-04 ENCOUNTER — OUTPATIENT (OUTPATIENT)
Dept: OUTPATIENT SERVICES | Facility: HOSPITAL | Age: 77
LOS: 1 days | End: 2022-11-04
Payer: MEDICARE

## 2022-11-04 ENCOUNTER — APPOINTMENT (OUTPATIENT)
Dept: PHYSICAL MEDICINE AND REHAB | Facility: CLINIC | Age: 77
End: 2022-11-04

## 2022-11-04 DIAGNOSIS — M54.12 RADICULOPATHY, CERVICAL REGION: ICD-10-CM

## 2022-11-04 DIAGNOSIS — K46.9 UNSPECIFIED ABDOMINAL HERNIA WITHOUT OBSTRUCTION OR GANGRENE: Chronic | ICD-10-CM

## 2022-11-04 PROCEDURE — 62321 NJX INTERLAMINAR CRV/THRC: CPT

## 2022-11-08 DIAGNOSIS — M54.13 RADICULOPATHY, CERVICOTHORACIC REGION: ICD-10-CM

## 2022-11-14 ENCOUNTER — APPOINTMENT (OUTPATIENT)
Dept: INTERNAL MEDICINE | Facility: CLINIC | Age: 77
End: 2022-11-14

## 2022-11-14 VITALS
RESPIRATION RATE: 14 BRPM | TEMPERATURE: 98.2 F | BODY MASS INDEX: 25.07 KG/M2 | DIASTOLIC BLOOD PRESSURE: 68 MMHG | SYSTOLIC BLOOD PRESSURE: 126 MMHG | HEIGHT: 66 IN | HEART RATE: 74 BPM | WEIGHT: 156 LBS | OXYGEN SATURATION: 98 %

## 2022-11-14 DIAGNOSIS — Z01.818 ENCOUNTER FOR OTHER PREPROCEDURAL EXAMINATION: ICD-10-CM

## 2022-11-14 DIAGNOSIS — K76.0 FATTY (CHANGE OF) LIVER, NOT ELSEWHERE CLASSIFIED: ICD-10-CM

## 2022-11-14 PROCEDURE — 36415 COLL VENOUS BLD VENIPUNCTURE: CPT

## 2022-11-14 PROCEDURE — 99214 OFFICE O/P EST MOD 30 MIN: CPT | Mod: 25

## 2022-11-14 NOTE — PHYSICAL EXAM
[General Appearance - Alert] : alert [General Appearance - In No Acute Distress] : in no acute distress [General Appearance - Well-Appearing] : healthy appearing [Sclera] : the sclera and conjunctiva were normal [PERRL With Normal Accommodation] : pupils were equal in size, round, and reactive to light [Extraocular Movements] : extraocular movements were intact [Outer Ear] : the ears and nose were normal in appearance [Nasal Cavity] : the nasal mucosa and septum were normal [Oropharynx] : the oropharynx was normal [Neck Appearance] : the appearance of the neck was normal [Thyroid Diffuse Enlargement] : the thyroid was not enlarged [Respiration, Rhythm And Depth] : normal respiratory rhythm and effort [Auscultation Breath Sounds / Voice Sounds] : lungs were clear to auscultation bilaterally [Heart Rate And Rhythm] : heart rate was normal and rhythm regular [Heart Sounds] : normal S1 and S2 [Murmurs] : no murmurs [Arterial Pulses Carotid] : carotid pulses were normal with no bruits [Full Pulse] : the pedal pulses are present [Edema] : there was no peripheral edema [Bowel Sounds] : normal bowel sounds [Abdomen Soft] : soft [Abdomen Tenderness] : non-tender [Cervical Lymph Nodes Enlarged Posterior Bilaterally] : posterior cervical [Cervical Lymph Nodes Enlarged Anterior Bilaterally] : anterior cervical [Supraclavicular Lymph Nodes Enlarged Bilaterally] : supraclavicular [No CVA Tenderness] : no ~M costovertebral angle tenderness [No Spinal Tenderness] : no spinal tenderness [] : no rash [No Focal Deficits] : no focal deficits [Oriented To Time, Place, And Person] : oriented to person, place, and time [Affect] : the affect was normal [Mood] : the mood was normal [FreeTextEntry1] : left upper face: well healed surgical wound w/o inflammation

## 2022-11-14 NOTE — ASSESSMENT
[FreeTextEntry1] : \par 78 yo M pmhx HTN, HLD, CAD s/p stents, preDM, anxiety/depression, FL, glaucoma, BPH, lung nodules for f/u\par \par anxiety/depression- stable mood despite recent stress 2/2 son's health issues, denies acute sx's\par -on lorazepam prn, refilled (iSTOP checked)\par -hx considering Ketamine program for mood that had read about in [place] times- has spoken to his cardiologist who advised against it due to potential cardiac SEs and since he has decided to not pursue it.\par -declines  referral\par -advised to f/u if sx's worsen\par \par CAD hx stents (last 10/20), HTN, HLD- 4/22 Tchol 128  HDL 42 LDL 64; LFTs wnl.  Reports ? afib detected with home BP monitor.  Asx.\par -7/22 EKG: NSR @ 69 bpm, no acute pathology\par -hx self d/c'd Lovaza 1/14 due to cost (no hx adverse rxn)\par -hx cardiac cath 10/20- noted high grade mid LAD lesion with stent placed, noted patent RCA and OM stents. \par -followed by cardio (Dr. Alcocer, [facility]) - followed by cardio and on med regimen. Seen last week- had labs prior, states only lipids done- told good, no med changes made.  Is awaiting holter monitor x 21 d for hx ? afib reading on home monitor- denies any sx onset at time.\par planned to f/u 6 months.  Asked to forward records.  \par -off pravastatin 2/2 diarrhea with use\par -on Crestor, tolerating w/o SEs\par -off Plavix\par -on ASA and ramipril\par -1/22 cmp/TSH, screening UA unremarkable\par -4/22 cmp wnl \par -advised of prompt medical eval need if sx onset (ie dizziness, CP, palpitations, sob, etc)\par -check cmp, lipids\par \par preDM- 1/22 A1c 5.7 (was 5.7)\par -ADA diet and exercise counseled\par -check A1c\par \par hx microscopic hematuria, BPH- stable per pt\par -off myrbetriq 2/2 constipation\par -self d/c'd oxybutynin since  visit as did not like taking it- declines medication use at this time\par -1/22 bmp/PSA/UA/Ucx unremarkable \par -followed by , seen 3/22 with Trospium added (use pending) to Flomax BID.  F/U pending.\par -advised to f/u if sx's worsen, dysuria, hesitancy, etc.\par \par hx fatty liver, colon polyps- asx\par -6/19 screening colonoscopy: +polyps, rec: repeat in 5 yrs\par -9/19 US abdomen: fatty liver, planned to repeat in 1 yr - pt declines repeat imaging.\par -followed by GI, Dr. Cano - had virtual visit 11/21, told c-scope due as advised prior 5 yrs from last in 6/19. Last seen 11/21.\par -4/22 LFTs wnl\par -low fat diet, exercise advised\par \par hx glaucoma, detached retina repair- asx\par -followed by optho. Seen q 6mo. Last 8/22- told stable findings, nl dilated exam and no med changes made per pt.  To f/u in 6mo.\par -on gtt\par \par hx right neck/shoulder/ left upper back radiculopathy pain, hip pain, hx lumbar stenosis, b/l CTS- s/p c-spine injection recently w/o change in sx's.\par -hx injection 11/15\par -[facility], s/p home PT on own with improvement\par -followed by ortho, seen 10/21 with shoulder injected\par -following with PMR, has f/u 11/22\par -on Tylenol prn; advised on meloxicam adverse SEs with consistent use (bleeding risk concurrent with ASA use, renal SEs, etc)- advised for periodic bmp monitoring with use\par \par hx b/l hearing loss- stable\par -followed by ENT, seen f/u 2/22 per pt - followed yearly\par -using hearing aides\par -advised to avoid qtips, use OTC wax removing drops with cotton prn\par \par hx left hand pain- dx'd de Quervain's and is s/p injection x 2 (last 10/18)- s/p injection 7/22 with improvement\par -followed by hand ortho\par -on Tylenol prn\par \par hx former tobacco use, hx abnl screening CT scan 9/19- asx\par -hx > 30 yr 1ppd, quit > 20 yrs \par -9/19 CT lung screening: Few less than 0.5 cm solid nodules are noted in the right middle lobe. \par Few patchy opacities are noted in both upper and right middle lobes. Follow-up CT scan is \par recommended in 3 months to ensure resolution. Lung RADS 2: Category benign appearance, continue yearly screening. (hx doing scan concurrent with URI sx's)\par -1/20 CT lung screening: (c/w 9/19) Near complete resolution of the opacities/consolidations in the right upper lobe and the right middle lobe when compared to previous exam. Stable few small nodules in the right middle lobe when compared to previous exam. Lungs RADS 2: benign, rec: annual screening. Rx for repeat placed (pending)- no declines. States does not wish to have done and pursue surveillance for lung cancer. Risks/benefits discussed.\par -Cont'd smoking cessation advised.\par -advised prompt medical eval if cough, fever, etc.\par \par hx BCC- s/p MOH's 9/22, healed\par -followed by Moh's surgeon, has f/u later this week\par -followed by derm, planned to f/u 2/23\par -regular use of sun block for skin cancer prevention counseled\par \par \par MISC: Continued social distancing and measure for covid19 prevention encouraged. \par -hx covid vaccine (Moderna) series - hx boosters x2 (last 9/22)\par \par \par HCM\par -hx CPE 1/22, yearly advised\par -1/22 hep C screening negative\par -hx flu shot 9/22\par -hx PVX 4/11\par -hx prevnar 11/15\par -hx Tdap 7/14\par -hx shingrix series at pharmacy, finished 1/19\par -hx colonoscopy 6/19: colon polyps, rec: repeat in 5 yrs (due 2024) (Dr. Cano)\par -hx nl AAA screening US in 2018 by cardio per pt, declines repeat. Asked to forward record.\par -followed by derm, yearly screening advised. Regular use of sun block for skin cancer prevention counseled.\par -HCP: Alex Barboza, [phone] and sonChip, [phone] (copy for chart 2/7/22)\par \par Pt's cell: [phone]\par \par Labs drawn in office today.\par

## 2022-11-14 NOTE — HISTORY OF PRESENT ILLNESS
[FreeTextEntry1] : \par f/u [de-identified] : \par 78 yo M pmhx HTN, HLD, CAD s/p stents, preDM, anxiety/depression, FL, glaucoma, BPH, lung nodules for f/u\par \par \par Feeling well today.\par Needs med refill.\par Fasting for labs, had tea with milk prior to visit- wants labs today\par \par Reports is socially distancing and using precautions for covid prevention.\par Denies sick or covid positive contacts.\par Denies fever, chills, cough or sob.\par -hx covid vaccine (Moderna) series - hx boosters x2 (last 9/22)\par \par Had MOHs to left forehead in 10/22- told BCC, healing well\par -seen Moh's surgeon later this week, hx doxycycline x few days after procedure for focal redness- all resolved\par -saw derm few days ago, planned to f/u 2/23\par \par States his adult son was staying with him s/p recent inpt stay at [facility] since 4/22 after surgery for gallbladder/pancreas surgery- notes had mild complications (leakage) and inpt stay.  States all going well.  States son had been sober until 10/22- declines assistance to stop.\par -remain with good terms\par \par hx anxiety/depression- reports stable mood, denies HI/SI or panic attacks. \par -using lorazepam 3x per week mainly qhs. \par -reports has good social support\par \par hx CAD, HTN, HLD-\par -followed by cardio and on med regimen.  Seen last week- had labs prior, states only lipids done- told good, no med changes made\par awaiting holter monitor x 21 d for hx ? afib reading on home monitor- denies any sx onset at time.\par planned to f/u 6 months\par -home BP: /59-73\par -hx optho eval 8/22- told nl dilated exam and stable eye pressure, no med changes made, to f/u in 6 mo per pt.  \par -denies dizziness, CP, palpitations or sob\par \par Reports low fat/salt/carb diet\par Exercising: walks daily 45 mins 2x/wk- done w/o sx's or limitations\par Denies cough, CP, sob, dizziness or GI complaints. \par \par hx right neck pain/numbness with radiation to right shoulder pain and occasional hand tingling, back pain- reports stable\par -following with PMR, states had cervical spine epidural injection 2 week ago- no change in sx's noted since. Has f/u this week with PMR.\par \par \par BPH, nocturia- reports stable chronic sx's, denies new  complaints\par -followed by , seen 3/22 with Trospium added (use pending per pt as feels is not needed) to Flomax BID.  F/U pending.\par

## 2022-11-15 LAB
ALBUMIN SERPL ELPH-MCNC: 4 G/DL
ALP BLD-CCNC: 66 U/L
ALT SERPL-CCNC: 10 U/L
ANION GAP SERPL CALC-SCNC: 9 MMOL/L
AST SERPL-CCNC: 16 U/L
BILIRUB SERPL-MCNC: 0.4 MG/DL
BUN SERPL-MCNC: 12 MG/DL
CALCIUM SERPL-MCNC: 9 MG/DL
CHLORIDE SERPL-SCNC: 104 MMOL/L
CHOLEST SERPL-MCNC: 142 MG/DL
CO2 SERPL-SCNC: 27 MMOL/L
CREAT SERPL-MCNC: 0.96 MG/DL
EGFR: 81 ML/MIN/1.73M2
ESTIMATED AVERAGE GLUCOSE: 120 MG/DL
GLUCOSE SERPL-MCNC: 101 MG/DL
HBA1C MFR BLD HPLC: 5.8 %
HDLC SERPL-MCNC: 43 MG/DL
LDLC SERPL CALC-MCNC: 68 MG/DL
NONHDLC SERPL-MCNC: 98 MG/DL
POTASSIUM SERPL-SCNC: 4.6 MMOL/L
PROT SERPL-MCNC: 6.6 G/DL
SODIUM SERPL-SCNC: 140 MMOL/L
TRIGL SERPL-MCNC: 150 MG/DL

## 2022-11-21 ENCOUNTER — APPOINTMENT (OUTPATIENT)
Dept: PHYSICAL MEDICINE AND REHAB | Facility: CLINIC | Age: 77
End: 2022-11-21

## 2022-11-21 VITALS — HEART RATE: 79 BPM | DIASTOLIC BLOOD PRESSURE: 69 MMHG | OXYGEN SATURATION: 96 % | SYSTOLIC BLOOD PRESSURE: 112 MMHG

## 2022-11-21 PROCEDURE — 99214 OFFICE O/P EST MOD 30 MIN: CPT

## 2022-11-21 NOTE — ASSESSMENT
[FreeTextEntry1] : 77-year-old male with chronic neck pains due to cervical radiculopathy and cervical spondylosis without myelopathy. Patient has significant pain and radiating symptoms along the right arm with concordant MRI findings. Patient also has right shoulder pain and adhesive capsulitis which is being treated by ortho Dr. Adams.\par \par Patient is still noting the same neck and right arm pains with tingling after having cervical JAMES. He does not wish to pursue surgery at this time\par \par Patient reassured and educated on the diagnosis and treatment options. It can take up to 4-6 weeks for full effect of steroid injections.\par \par Patient was prescribed Gabapentin 100mg POD for neuropathic pain. Titration was explained to patient and patient expressed understanding: start QHS for 1 week, then BID for 1 week, then TID. Monitor for sedation, dizziness and any signs of respiratory depression. Avoid taking together with opioid pain medicines and other drugs that depress the central nervous system function. Avoid sudden cessation after prolonged use, due to risk of seizures. Risks and benefits were explained and patient expressed understanding.\par \par \par Patient was advised if the following symptoms develop: chills, fever, loss of bladder control, bowel incontinence or urinary retention, numbness/tingling or weakness is present in upper or lower extremities, to go to the nearest emergency room. This may be a new clinical condition not present at the time of the patient visit that may lead to paralysis and/or death. Patient advised if the above symptoms developed to also call the office immediately to inform us and to go to the nearest emergency room.\par \par Follow up 1-2 months\par Titrate Gabapentin\par Evaluate lumbar radiculitis next visit\par \par This note was generated using Dragon medical dictation software. A reasonable effort had been made for proofreading its contents, but spelling mistakes or grammatical errors may still remain. If there are any questions or points of clarification needed please notify my office.

## 2022-11-21 NOTE — PHYSICAL EXAM
[FreeTextEntry1] : General exam \par \par Constitutional: The patient appears well-developed, well-nourished, and in no apparent distress. Patient is well-groomed.  \par \par Skin: The skin is warm and dry, with normal turgor.\par \par Eyes: PERRL.  \par \par ENMT: Ears: Hearing is grossly within normal limits.  \par \par Neck: Supple: The neck is supple.  \par \par Respiratory: Inspection: Breathing unlabored.  \par \par Neurologic: Alert and oriented x 3. \par \par Psychiatric: Patient is cooperative and appropriate.  Mood and affect are normal.  Patient's insight is good, and memory and judgment are intact.\par \par Cervical spine\par Skin c/d/i without any erythema, swelling, effusion or tenderness\par

## 2022-11-21 NOTE — HISTORY OF PRESENT ILLNESS
[FreeTextEntry1] : DUC SOTELO had cervical epidural steroid injection on 11/4/22. Today patient is here for follow up. Patient reports about 5% reduction in pain as tested by the NRS pain scale. He is still complaining of tingling in both hands, right > left. He does not wish to go for surgery to help with his symptoms.\par \par Denies any new pain, numbness or weakness, bowel/bladder dysfunction, saddle anesthesia, fevers, chills, weight loss, night pain, or night sweats at this time.\par

## 2022-11-21 NOTE — REVIEW OF SYSTEMS
[Chest Pain] : no chest pain [Shortness Of Breath] : no shortness of breath [Abdominal Pain] : no abdominal pain [Negative] : Constitutional [FreeTextEntry9] : neck, back pains shooting down the right side

## 2022-12-21 ENCOUNTER — APPOINTMENT (OUTPATIENT)
Dept: PHYSICAL MEDICINE AND REHAB | Facility: CLINIC | Age: 77
End: 2022-12-21

## 2022-12-21 VITALS
OXYGEN SATURATION: 97 % | DIASTOLIC BLOOD PRESSURE: 79 MMHG | SYSTOLIC BLOOD PRESSURE: 144 MMHG | TEMPERATURE: 97.5 F | HEART RATE: 70 BPM

## 2022-12-21 DIAGNOSIS — M54.2 CERVICALGIA: ICD-10-CM

## 2022-12-21 PROCEDURE — 99213 OFFICE O/P EST LOW 20 MIN: CPT

## 2022-12-21 NOTE — ASSESSMENT
[FreeTextEntry1] : 77-year-old male with chronic neck pains due to cervical radiculopathy and cervical spondylosis without myelopathy. Patient has significant pain and radiating symptoms along the right arm with concordant MRI findings that had since improved with Gabapentin.Patient also has right shoulder pain and adhesive capsulitis which is being treated by ortho Dr. Adams.\par \par Patient reassured and educated on the diagnosis and treatment options.\par \par Patient was prescribed Gabapentin 100mg POD for neuropathic pain. Titration was explained to patient and patient expressed understanding: start QHS for 1 week, then BID for 1 week, then TID. Monitor for sedation, dizziness and any signs of respiratory depression. Avoid taking together with opioid pain medicines and other drugs that depress the central nervous system function. Avoid sudden cessation after prolonged use, due to risk of seizures. Risks and benefits were explained and patient expressed understanding.\par \par Continue Gabapentin same dose for 3-6 months and will discuss weaning off next visit\par Follow up 3 months\par \par This note was generated using Dragon medical dictation software. A reasonable effort had been made for proofreading its contents, but spelling mistakes or grammatical errors may still remain. If there are any questions or points of clarification needed please notify my office.\par \par Patient was advised if the following symptoms develop: chills, fever, loss of bladder control, bowel incontinence or urinary retention, numbness/tingling or weakness is present in upper or lower extremities, to go to the nearest emergency room. This may be a new clinical condition not present at the time of the patient visit that may lead to paralysis and/or death. Patient advised if the above symptoms developed to also call the office immediately to inform us and to go to the nearest emergency room.\par

## 2022-12-21 NOTE — PHYSICAL EXAM
[FreeTextEntry1] : General exam \par \par Constitutional: The patient appears well-developed, well-nourished, and in no apparent distress. Patient is well-groomed. \par \par Skin: The skin is warm and dry, with normal turgor.\par \par Eyes: PERRL. \par \par ENMT: Ears: Hearing is grossly within normal limits. \par \par Neck: Supple: The neck is supple. \par \par Respiratory: Inspection: Breathing unlabored. \par \par Neurologic: Alert and oriented x 3. \par \par Psychiatric: Patient is cooperative and appropriate. Mood and affect are normal. Patient's insight is good, and memory and judgment are intact.\par \par Cervical spine\par Skin c/d/i without any erythema, swelling, effusion or tenderness\par Right PROM bending and rotating is limited\par \par Gait is non- antalgic\par Balance is fair

## 2022-12-21 NOTE — HISTORY OF PRESENT ILLNESS
[FreeTextEntry1] : DUC SOTELO is here for follow up for his neck pains. He had cervical epidural steroid injection on 11/4/22. Today patient is here for follow up. He reports the injection offered little relief. However taking Gabapentin 100mg PO TID helped improved his overall pain and right hand tingling. His mood and also sleep had significantly improved. He no longer has to use Lorazepam and he is very happy. Pain is 1/10 in severity in his neck today. He reports no lower back pains like he did before.\par \par Denies any new pain, numbness or weakness, bowel/bladder dysfunction, saddle anesthesia, fevers, chills, weight loss, night pain, or night sweats at this time.\par

## 2023-02-11 ENCOUNTER — OFFICE (OUTPATIENT)
Dept: URBAN - METROPOLITAN AREA CLINIC 90 | Facility: CLINIC | Age: 78
Setting detail: OPHTHALMOLOGY
End: 2023-02-11
Payer: MEDICARE

## 2023-02-11 DIAGNOSIS — H01.004: ICD-10-CM

## 2023-02-11 DIAGNOSIS — H04.123: ICD-10-CM

## 2023-02-11 DIAGNOSIS — H01.001: ICD-10-CM

## 2023-02-11 DIAGNOSIS — H18.413: ICD-10-CM

## 2023-02-11 DIAGNOSIS — H25.12: ICD-10-CM

## 2023-02-11 DIAGNOSIS — H40.051: ICD-10-CM

## 2023-02-11 DIAGNOSIS — H43.813: ICD-10-CM

## 2023-02-11 PROCEDURE — 92134 CPTRZ OPH DX IMG PST SGM RTA: CPT | Performed by: OPHTHALMOLOGY

## 2023-02-11 PROCEDURE — 92083 EXTENDED VISUAL FIELD XM: CPT | Performed by: OPHTHALMOLOGY

## 2023-02-11 PROCEDURE — 92012 INTRM OPH EXAM EST PATIENT: CPT | Performed by: OPHTHALMOLOGY

## 2023-02-11 ASSESSMENT — REFRACTION_MANIFEST
OS_VA1: 20/30
OD_AXIS: 30
OD_AXIS: 120
OD_CYLINDER: -2.75
OD_VA1: 20/30
OD_VA1: 20/25-
OD_ADD: +3.50
OD_SPHERE: -3.25
OD_CYLINDER: +2.75
OD_SPHERE: -5.25
OS_SPHERE: -0.50
OS_AXIS: 045
OS_VA1: 20/30
OS_AXIS: 140
OS_ADD: +3.50
OS_SPHERE: PLANO
OS_CYLINDER: +0.50
OS_CYLINDER: -0.50

## 2023-02-11 ASSESSMENT — REFRACTION_AUTOREFRACTION
OS_AXIS: 083
OS_SPHERE: -0.50
OS_CYLINDER: -1.00
OD_AXIS: 115
OD_SPHERE: -2.75
OD_CYLINDER: -3.00

## 2023-02-11 ASSESSMENT — SPHEQUIV_DERIVED
OS_SPHEQUIV: -1
OS_SPHEQUIV: -0.75
OD_SPHEQUIV: -4.25
OD_SPHEQUIV: -3.875
OD_SPHEQUIV: -4.625

## 2023-02-11 ASSESSMENT — REFRACTION_CURRENTRX
OS_ADD: +3.25
OD_VPRISM_DIRECTION: PROGS
OD_AXIS: 120
OS_VPRISM_DIRECTION: PROGS
OS_CYLINDER: -0.75
OS_CYLINDER: -0.50
OS_VPRISM_DIRECTION: PROGS
OS_SPHERE: +0.50
OS_OVR_VA: 20/
OS_AXIS: 045
OD_ADD: +3.00
OD_AXIS: 125
OD_CYLINDER: -2.75
OD_OVR_VA: 20/
OD_ADD: +3.25
OS_AXIS: 064
OS_ADD: +3.25
OD_SPHERE: -2.75
OD_CYLINDER: -2.75
OD_OVR_VA: 20/
OS_OVR_VA: 20/
OD_VPRISM_DIRECTION: PROGS
OD_SPHERE: -3.25
OS_SPHERE: -0.25

## 2023-02-11 ASSESSMENT — VISUAL ACUITY
OS_BCVA: 20/40-1
OD_BCVA: 20/40-1

## 2023-02-11 ASSESSMENT — LID EXAM ASSESSMENTS
OD_BLEPHARITIS: RUL T 1+
OS_BLEPHARITIS: LUL T 1+

## 2023-02-11 ASSESSMENT — KERATOMETRY
METHOD_AUTO_MANUAL: MANUAL
OS_AXISANGLE_DEGREES: 136
OS_K2POWER_DIOPTERS: 42.25
OD_AXISANGLE_DEGREES: 039
OD_K2POWER_DIOPTERS: 42.50
OS_K1POWER_DIOPTERS: 41.50
OD_K1POWER_DIOPTERS: 40.50

## 2023-02-11 ASSESSMENT — TONOMETRY
OS_IOP_MMHG: 14
OD_IOP_MMHG: 16

## 2023-02-11 ASSESSMENT — AXIALLENGTH_DERIVED
OD_AL: 26.2519
OD_AL: 26.0722
OS_AL: 24.5158
OD_AL: 26.434
OS_AL: 24.6214

## 2023-02-11 ASSESSMENT — LID POSITION - LOWER LID LAG
OD_LOWER_LID_LAG: RLL 1+
OS_LOWER_LID_LAG: LLL 1+

## 2023-02-11 ASSESSMENT — CONFRONTATIONAL VISUAL FIELD TEST (CVF)
OS_FINDINGS: FULL
OD_FINDINGS: FULL

## 2023-03-15 ENCOUNTER — APPOINTMENT (OUTPATIENT)
Dept: PHYSICAL MEDICINE AND REHAB | Facility: CLINIC | Age: 78
End: 2023-03-15
Payer: MEDICARE

## 2023-03-15 VITALS — DIASTOLIC BLOOD PRESSURE: 67 MMHG | SYSTOLIC BLOOD PRESSURE: 112 MMHG | OXYGEN SATURATION: 95 % | HEART RATE: 75 BPM

## 2023-03-15 PROCEDURE — 99214 OFFICE O/P EST MOD 30 MIN: CPT

## 2023-03-15 NOTE — PHYSICAL EXAM
[FreeTextEntry1] : General exam \par \par Constitutional: The patient appears well-developed, well-nourished, and in no apparent distress. Patient is well-groomed.  \par \par Skin: The skin is warm and dry, with normal turgor.\par \par Eyes: PERRL.  \par \par ENMT: Ears: Hearing is grossly within normal limits.  \par \par Neck: Supple: The neck is supple.  \par \par Respiratory: Inspection: Breathing unlabored.  \par \par Neurologic: Alert and oriented x 3. \par \par Psychiatric: Patient is cooperative and appropriate.  Mood and affect are normal.  Patient's insight is good, and memory and judgment are intact.\par \par CERVICAL EXAM\par \par APPEARANCE:\par No visible scars\par No gross deformity or malalignment\par No erythema, swelling or ecchymosis\par Normal temperature to touch\par Normal cervical lordosis\par No muscle atrophy of the left upper extremity\par No muscle atrophy of the right upper extremity\par No clubbing, cyanosis, swelling or erythema on the left upper extremity\par No clubbing, cyanosis, swelling or erythema on the right upper extremity\par \par TENDERNESS:\par +trigger points over bilateral trapezius muscles\par Absent over midline spinous processes\par Absent over left cervical facet joints\par +over right cervical facet joints \par Absent over left cervical paraspinal muscles and Trapezius \par +over right cervical paraspinal muscles and Trapezius\par \par ROM:\par Limited end range with A/PROM of the cervical spine\par No pain with flexion, extension of the cervical spine\par No pain with left side bending\par +pain with right side bending\par No pain with left rotation\par +pain with right rotation\par \par SPECIAL TESTS:\par Normal left Spurling test\par Normal right Spurling test\par Negative Lhermitte´s sign\par Normal cervical compression test\par \par SENSORY TESTING:\par Intact to light touch Left C3-T2\par Intact to light touch Right C3-T2\par \par MOTOR TESTING:\par Muscle tone of the left upper extremity is normal\par Muscle tone of the right upper extremity is normal\par \par Hand  strength Left 5/5\par Hand  strength Right 5/5\par \par Finger abductor strength Left 5/5\par Finger abductor strength Right 5/5\par \par Elbow flexion strength Left 5/5\par Elbow flexion strength Right 5/5\par \par Elbow extension strength Left 5/5\par Elbow extension strength Right 5/5\par \par Shoulder flexion strength Left 5/5\par Shoulder flexion strength Right 5/5\par \par Shoulder extension strength Left 5/5\par Shoulder extension strength Right 5/5\par \par Shoulder abduction strength Left 5/5\par Shoulder abduction strength Right 5/5\par \par REFLEXES:\par Ortiz sign left negative\par Ortiz sign right negative\par \par Biceps (C5) left 2+\par Biceps (C5) right 2+\par \par \par GAIT:\par Non-antalgic gait\par Balance normal with ambulation\par

## 2023-03-15 NOTE — HISTORY OF PRESENT ILLNESS
[FreeTextEntry1] : DUC SOTELO is here for follow up for his neck pain. Last visit was on 12/21/22. Since then patient notes that his pain in the neck has gotten worse when he sleeps in certain positions. He wakes up feeling very stiff. He does note that he no longer has much tingling. He decreased Gabapentin to BID and wondering if we can stop it.\par \par Pain on right side of neck 6/10 in the mornings and with head turning. Associated with stiffness.\par \par Denies any new pain, numbness or weakness, bowel/bladder dysfunction, saddle anesthesia, fevers, chills, weight loss, night pain, or night sweats at this time.\par

## 2023-03-15 NOTE — ASSESSMENT
[FreeTextEntry1] : Mr. DUC SOTELO is a 78 year M with pain in the neck on the right. He reports chronic long standing pain and is noting an acute on chronic exacerbation of this pain due to cervical spondylosis without myelopathy. There are no myelopathic signs on today's exam. Radicular symptoms seems to have improved.\par \par Patient reassured and educated on the diagnosis and treatment options. Risks and benefits of treatment and of delaying treatment discussed with patient. Risks discussed include but not limited to: progression of symptoms, worsening pain and functional status, etc.\par \par MRI cervical spine reviewed\par \par DUC SOTELO is taking blood thinners: Eliquis at this time. Risks and benefits of having injection while on blood thinners explained to the patient. Risks discussed included, but not limited to: pain, infection, bleeding requiring emergency surgery, headaches, damage to vital organs. \par \par DUC SOTELO will need to hold blood thinner for Eliquis for 3 days prior to coming in for the procedure.\par \par Advised to titrate Gabapenti from BID to QHS for 2 weeks and stop completely. Will evaluate progress next visit.\par \par Patient had tried and failed conservative treatment. This includes but is not limited to: Acetaminophen, NSAIDs, muscle relaxants, neuropathic medications, physician directed home exercises and/or physical therapy for at least 8 weeks. After a thorough discussion of risks and benefits patient would like to proceed with RIGHT C56, C67, C7T1 MEDIAL BRANCH BLOCKS WITH FLUOROSCOPIC GUIDANCE WITH PLAN TO DO RADIOFREQUENCY ABLATION IN THE FUTURE.\par \par Risks and benefits of having injection discussed with patient. Risks discussed included, but not limited to: pain, infection, bleeding requiring emergency surgery, headaches, damage to vital organs, etc.\par \par Based on the history, physical exam and diagnostic findings, patient will benefit from this procedure. The goal of this procedure is to reduce pain and inflammation, improve function and range of motion and to further promote increased activity and return to previous level of functioning. The ultimate goal of performing this procedure is to improve patient's quality of life.\par \par Asking for authorization for RIGHT C56, C67, C7T1 MEDIAL BRANCH BLOCKS WITH FLUOROSCOPIC GUIDANCE WITH PLAN TO DO RADIOFREQUENCY ABLATION IN THE FUTURE to help treat patient´s pain.  Patient will be scheduled for this procedure.\par \par I have personally spent a total of at least 35 minutes preparing, reviewing internal and external records, explaining, counseling, and coordinating care for this patient encounter.\par \par Patient was advised if the following symptoms develop: chills, fever, loss of bladder control, bowel incontinence or urinary retention, numbness/tingling or weakness is present in upper or lower extremities, to go to the nearest emergency room. This may be a new clinical condition not present at the time of the patient visit that may lead to paralysis and/or death. Patient advised if the above symptoms developed to also call the office immediately to inform us and to go to the nearest emergency room.\par \par This note was generated using Dragon medical dictation software. A reasonable effort had been made for proofreading its contents, but spelling mistakes or grammatical errors may still remain. If there are any questions or points of clarification needed please notify my office.\par \par

## 2023-03-23 ENCOUNTER — APPOINTMENT (OUTPATIENT)
Dept: INTERNAL MEDICINE | Facility: CLINIC | Age: 78
End: 2023-03-23
Payer: MEDICARE

## 2023-03-23 VITALS
OXYGEN SATURATION: 97 % | BODY MASS INDEX: 25.55 KG/M2 | WEIGHT: 159 LBS | TEMPERATURE: 97.8 F | DIASTOLIC BLOOD PRESSURE: 64 MMHG | SYSTOLIC BLOOD PRESSURE: 102 MMHG | RESPIRATION RATE: 17 BRPM | HEIGHT: 66 IN | HEART RATE: 74 BPM

## 2023-03-23 DIAGNOSIS — H91.90 UNSPECIFIED HEARING LOSS, UNSPECIFIED EAR: ICD-10-CM

## 2023-03-23 DIAGNOSIS — R35.0 FREQUENCY OF MICTURITION: ICD-10-CM

## 2023-03-23 PROCEDURE — G0444 DEPRESSION SCREEN ANNUAL: CPT

## 2023-03-23 PROCEDURE — G0439: CPT

## 2023-03-23 PROCEDURE — 36415 COLL VENOUS BLD VENIPUNCTURE: CPT

## 2023-03-24 PROBLEM — H91.90 HEARING LOSS: Status: ACTIVE | Noted: 2023-03-24

## 2023-03-24 RX ORDER — APIXABAN 5 MG/1
5 TABLET, FILM COATED ORAL
Refills: 0 | Status: ACTIVE | COMMUNITY

## 2023-03-24 RX ORDER — METOPROLOL TARTRATE 25 MG/1
25 TABLET, FILM COATED ORAL DAILY
Refills: 0 | Status: ACTIVE | COMMUNITY

## 2023-03-24 NOTE — HISTORY OF PRESENT ILLNESS
[Health Maintenance] : health maintenance [Former Cigarette Smoker] : is a former cigarette smoker [Occasional Use] : occasional alcohol use [Never] : has never used illicit drugs [Good] : good [Reg. Dental Visits] : He has regular dental visits [Hearing Loss] : He has hearing loss [Healthy Diet] : He consumes a diverse and healthy diet [Regular Exercise] : He exercises regularly [Binge Drinking] : denies binge drinking [Patient Concern] : no personal concern about alcohol use [Family Concern] : no family concern about alcohol use [Vision Problems] : He denies vision problems [Sexually Active] : the patient is not sexually active [de-identified] : 1/22 [de-identified] : alone [de-identified] :  [de-identified] : hx > 30 yr 1ppd, quit > 20 yrs  [de-identified] : retired [de-identified] : hx flu shot 9/22, hx Tdap 7/14, PVX 4/11, hx prevnar 11/15, hx shingles vaccine 7/11, hx shingrix vaccine series (finished 1/19) [de-identified] : \par 79 yo M pmhx HTN, HLD, CAD s/p stents, preDM, anxiety/depression, FL, glaucoma, BPH, lung nodules for AWV\par \par \par Feeling well today.\par Needs med refill.\par Not fasting- wants labs today\par \par \par Reports is socially distancing and using precautions for covid prevention.\par Denies sick or covid positive contacts.\par Denies fever, chills, cough or sob.\par -hx covid vaccine (Moderna) series - hx boosters x2 (last 9/22)\par \par \par hx anxiety/depression- reports stable mood, 2/2 son struggling with ETOH abuse- remain on good terms; denies HI/SI or panic attacks. \par -using lorazepam 2x per week mainly qhs. \par -reports has good social support\par \par hx CAD, HTN, HLD-\par -followed by cardio and on med regimen.  States last seen 6mo ago- dx'd with afib on w/u with holter for which started on Eliquis (taking x 3 mo), ramipril d/c'd and metoprolol 25 started- asx since.  Has f/u 4/23. Recently had labs done- has copy.\par -followed by optho. Seen q 6mo. Last 2/23- told stable findings\par -denies dizziness, CP, palpitations or sob\par \par Reports low fat/salt/carb diet\par Exercising: walks daily 45 mins 2x/wk- done w/o sx's or limitations\par Denies cough, CP, sob, dizziness or GI complaints. \par \par hx right neck pain/numbness with radiation to right shoulder pain and occasional hand tingling, back pain- reports stable\par -following with PMR, last seen 3/23 when advised taper off gabapentin (felt no help) and planned of epidural nerve block later this week off eliquis (has yet to discuss with cardiology).\par \par BPH, nocturia- reports stable chronic sx's, denies new  complaints\par -followed by , seen 3/22 with Trospium added (use pending per pt as feels is not needed) to Flomax BID.  F/U pending.\par \par Hx MOHs to left forehead in 10/22- told B[facility]\par -followed by Moh's surgeon\par -followed by derm, has f/u today

## 2023-03-24 NOTE — HEALTH RISK ASSESSMENT
[No falls in past year] : Patient reported no falls in the past year [0] : 1) Little interest or pleasure doing things: Not at all (0) [HIV test declined] : HIV test declined [Feels Safe at Home] : Feels safe at home [Fully functional (bathing, dressing, toileting, transferring, walking, feeding)] : Fully functional (bathing, dressing, toileting, transferring, walking, feeding) [Fully functional (using the telephone, shopping, preparing meals, housekeeping, doing laundry, using] : Fully functional and needs no help or supervision to perform IADLs (using the telephone, shopping, preparing meals, housekeeping, doing laundry, using transportation, managing medications and managing finances) [Smoke Detector] : smoke detector [Carbon Monoxide Detector] : carbon monoxide detector [Seat Belt] :  uses seat belt [Sunscreen] : uses sunscreen [With Patient/Caregiver] : , with patient/caregiver [Designated Healthcare Proxy] : Designated healthcare proxy [Name: ___] : Health Care Proxy's Name: [unfilled]  [Relationship: ___] : Relationship: [unfilled] [1] : 2) Feeling down, depressed, or hopeless for several days (1) [Several Days (1)] : 2.) Feeling down, depressed or hopeless? Several days [1/2 of Days or More (2)] : 5.) Poor appetite or overeating? Half the days or more [Not at All (0)] : 8.) Moving or speaking so slowly that other people could have noticed, or the opposite, moving or speaking faster than usual? Not at all [Mild] : severity of depression is mild [Not at all] : How difficult have these problems made it for you to do your work, take care of things at home, or get along with people? Not at all [PHQ-9 Positive] : PHQ-9 Positive [I have developed a follow-up plan documented below in the note.] : I have developed a follow-up plan documented below in the note. [[id number]] : 1 [[id number]] : 5 [ColonoscopyDate] : 06/19 [Guns at Home] : no guns at home [ColonoscopyComments] : colon polyps, rec: repeat in 5 yrs (Dr. Cano) [HepatitisCDate] : 01/22 [HepatitisCComments] : negative [AdvancecareDate] : 03/23

## 2023-03-24 NOTE — PHYSICAL EXAM
[General Appearance - Alert] : alert [General Appearance - In No Acute Distress] : in no acute distress [General Appearance - Well-Appearing] : healthy appearing [Sclera] : the sclera and conjunctiva were normal [PERRL With Normal Accommodation] : pupils were equal in size, round, and reactive to light [Extraocular Movements] : extraocular movements were intact [Outer Ear] : the ears and nose were normal in appearance [Both Tympanic Membranes Were Examined] : both tympanic membranes were normal [Nasal Cavity] : the nasal mucosa and septum were normal [Oropharynx] : the oropharynx was normal [Neck Appearance] : the appearance of the neck was normal [Thyroid Nodule] : there were no palpable thyroid nodules [Thyroid Diffuse Enlargement] : the thyroid was not enlarged [Respiration, Rhythm And Depth] : normal respiratory rhythm and effort [Auscultation Breath Sounds / Voice Sounds] : lungs were clear to auscultation bilaterally [Heart Rate And Rhythm] : heart rate was normal and rhythm regular [Murmurs] : no murmurs [Heart Sounds] : normal S1 and S2 [Arterial Pulses Carotid] : carotid pulses were normal with no bruits [Full Pulse] : the pedal pulses are present [Edema] : there was no peripheral edema [Bowel Sounds] : normal bowel sounds [Abdomen Soft] : soft [Abdomen Tenderness] : non-tender [Cervical Lymph Nodes Enlarged Posterior Bilaterally] : posterior cervical [Cervical Lymph Nodes Enlarged Anterior Bilaterally] : anterior cervical [Supraclavicular Lymph Nodes Enlarged Bilaterally] : supraclavicular [No CVA Tenderness] : no ~M costovertebral angle tenderness [No Spinal Tenderness] : no spinal tenderness [] : no rash [No Focal Deficits] : no focal deficits [Oriented To Time, Place, And Person] : oriented to person, place, and time [Affect] : the affect was normal [Mood] : the mood was normal

## 2023-03-24 NOTE — ASSESSMENT
[FreeTextEntry1] : \par 79 yo M pmhx HTN, HLD, afib (on ELiquis), CAD s/p stents, preDM, anxiety/depression, FL, glaucoma, BPH, lung nodules for AWV\par \par anxiety/depression-reports stable, mild on PHQ-9, denies acute sx's\par -on lorazepam prn, refilled (iSTOP checked)\par -hx considering Ketamine program for mood that had read about in [place] times- has spoken to his cardiologist who advised against it due to potential cardiac SEs and since he has decided to not pursue it.\par -declines BH referral\par -advised to f/u if sx's worsen\par \par afib, CAD hx stents (last 10/20), HTN, HLD- 11/22 Tchol 142  HDL 43 LDL 68; 3/23 LFTs wnl.  Asx.\par -hx self d/c'd Lovaza 1/14 due to cost (no hx adverse rxn)\par -hx cardiac cath 10/20- noted high grade mid LAD lesion with stent placed, noted patent RCA and OM stents. \par -followed by cardio (Dr. Alcocer, [facility]) - States last seen 6mo ago- dx'd with afib on w/u with holter for which started on Eliquis (taking x 3 mo), ramipril d/c'd and metoprolol 25 started- asx since. Has f/u 4/23. Recently had labs done- has copy.\par  Asked to forward records.  \par -off pravastatin 2/2 diarrhea with use\par -on Crestor, tolerating w/o SEs\par -off Plavix\par -off ramipril per cardio (with BB started for afib per pt)\par -on ASA and metoprolol 25 qd\par -1/22 cmp/TSH, screening UA unremarkable\par -3/23 cmp wnl \par -advised of prompt medical eval need if sx onset (ie dizziness, CP, palpitations, sob, etc)\par -check cmp, lipids, screening UA\par \par preDM- 11/22 A1c 5.8 (was 5.7)\par -ADA diet and exercise counseled\par -check A1c\par \par hx microscopic hematuria, BPH- stable per pt\par -off myrbetriq 2/2 constipation\par -self d/c'd oxybutynin since  visit as did not like taking it- declines medication use at this time\par -1/22 bmp/PSA/UA/Ucx unremarkable \par -followed by , seen 3/22 with Trospium added (use pending) to Flomax BID.  F/U pending.\par -advised to f/u if sx's worsen, dysuria, hesitancy, etc.\par -check PSA\par \par hx fatty liver, colon polyps- asx\par -6/19 screening colonoscopy: +polyps, rec: repeat in 5 yrs\par -9/19 US abdomen: fatty liver, planned to repeat in 1 yr - pt declines repeat imaging.\par -followed by GI, Dr. Cano - had virtual visit 11/21, told c-scope due as advised prior 5 yrs from last in 6/19. Last seen 11/21.\par -11/22 LFTs wnl\par -low fat diet, exercise advised\par \par hx glaucoma, detached retina repair- asx\par -followed by optho. Seen q 6mo. Last 2/23- told stable findings\par -on gtt\par \par hx right neck/shoulder/ left upper back radiculopathy pain, hip pain, hx lumbar stenosis, b/l CTS- s/p c-spine\par -[facility]- [facility]18, s/p home PT on own with improvement\par -followed by ortho, seen 10/21 with shoulder injected\par -following with PMR, last seen 3/23 when advised taper off gabapentin (felt no help) and planned of epidural nerve block later this week off eliquis (advised to d/w cardio need to be off for injection)\par -on Tylenol prn; advised to avoid NSAIDs due to bleeding risk while on Eliquis\par \par hx b/l hearing loss- stable, using hearing aides\par -followed by ENT, seen 1/23per pt - followed yearly\par -advised to avoid qtips, use OTC wax removing drops with cotton prn\par \par hx former tobacco use, hx abnl screening CT scan 9/19- asx\par -hx > 30 yr 1ppd, quit > 20 yrs \par -9/19 CT lung screening: Few less than 0.5 cm solid nodules are noted in the right middle lobe. \par Few patchy opacities are noted in both upper and right middle lobes. Follow-up CT scan is \par recommended in 3 months to ensure resolution. Lung RADS 2: Category benign appearance, continue yearly screening. (hx doing scan concurrent with URI sx's)\par -1/20 CT lung screening: (c/w 9/19) Near complete resolution of the opacities/consolidations in the right upper lobe and the right middle lobe when compared to previous exam. Stable few small nodules in the right middle lobe when compared to previous exam. Lungs RADS 2: benign, rec: annual screening. Rx for repeat placed (pending)- no declines. States does not wish to have done and pursue surveillance for lung cancer. Risks/benefits discussed.\par -Cont'd smoking cessation advised.\par -advised prompt medical eval if cough, fever, etc.\par \par hx BCC- s/p MOH's 9/22\par -followed by Moh's surgeon\par -followed by derm, has f/u today\par -regular use of sun block for skin cancer prevention counseled\par \par \par MISC: Continued social distancing and measure for covid19 prevention encouraged. \par -hx covid vaccine (Moderna) series - hx boosters x2 (last 9/22)\par \par \par HCM\par -check screening labs\par -1/22 hep C screening negative\par -hx flu shot 9/22\par -hx PVX 4/11\par -hx prevnar 11/15\par -Discussed Prevnar 20, wishes to consider and follow-up at next visit if desires\par -hx Tdap 7/14\par -hx shingrix series at pharmacy, finished 1/19\par -hx colonoscopy 6/19: colon polyps, rec: repeat in 5 yrs (due 2024) (Dr. Cano)\par -hx nl AAA screening US in 2018 by cardio per pt, declines repeat. Asked to forward record.\par -followed by derm, yearly screening advised. Regular use of sun block for skin cancer prevention counseled.\par -HCP: Alex Barboza, [phone] and son, Chip, [phone] (copy for chart 2/7/22)\par \par Pt's cell: [phone]\par \par Labs drawn in office today.\par

## 2023-03-25 LAB
ALBUMIN SERPL ELPH-MCNC: 4.3 G/DL
ALP BLD-CCNC: 57 U/L
ALT SERPL-CCNC: 15 U/L
ANION GAP SERPL CALC-SCNC: 12 MMOL/L
APPEARANCE: CLEAR
AST SERPL-CCNC: 20 U/L
BACTERIA UR CULT: NORMAL
BACTERIA: NEGATIVE
BASOPHILS # BLD AUTO: 0.04 K/UL
BASOPHILS NFR BLD AUTO: 0.9 %
BILIRUB SERPL-MCNC: 0.6 MG/DL
BILIRUBIN URINE: NEGATIVE
BLOOD URINE: ABNORMAL
BUN SERPL-MCNC: 15 MG/DL
CALCIUM OXALATE CRYSTALS: ABNORMAL
CALCIUM SERPL-MCNC: 9.7 MG/DL
CHLORIDE SERPL-SCNC: 103 MMOL/L
CHOLEST SERPL-MCNC: 139 MG/DL
CO2 SERPL-SCNC: 25 MMOL/L
COLOR: YELLOW
CREAT SERPL-MCNC: 1.03 MG/DL
EGFR: 74 ML/MIN/1.73M2
EOSINOPHIL # BLD AUTO: 0.08 K/UL
EOSINOPHIL NFR BLD AUTO: 1.7 %
ESTIMATED AVERAGE GLUCOSE: 120 MG/DL
GLUCOSE QUALITATIVE U: NEGATIVE
GLUCOSE SERPL-MCNC: 102 MG/DL
HBA1C MFR BLD HPLC: 5.8 %
HCT VFR BLD CALC: 49.2 %
HDLC SERPL-MCNC: 43 MG/DL
HGB BLD-MCNC: 15.4 G/DL
HYALINE CASTS: 0 /LPF
IMM GRANULOCYTES NFR BLD AUTO: 0.2 %
KETONES URINE: NEGATIVE
LDLC SERPL CALC-MCNC: 74 MG/DL
LEUKOCYTE ESTERASE URINE: NEGATIVE
LYMPHOCYTES # BLD AUTO: 1.09 K/UL
LYMPHOCYTES NFR BLD AUTO: 23.5 %
MAN DIFF?: NORMAL
MCHC RBC-ENTMCNC: 30.4 PG
MCHC RBC-ENTMCNC: 31.3 GM/DL
MCV RBC AUTO: 97 FL
MICROSCOPIC-UA: NORMAL
MONOCYTES # BLD AUTO: 0.55 K/UL
MONOCYTES NFR BLD AUTO: 11.9 %
NEUTROPHILS # BLD AUTO: 2.86 K/UL
NEUTROPHILS NFR BLD AUTO: 61.8 %
NITRITE URINE: NEGATIVE
NONHDLC SERPL-MCNC: 96 MG/DL
PH URINE: 6
PLATELET # BLD AUTO: 190 K/UL
POTASSIUM SERPL-SCNC: 4.6 MMOL/L
PROT SERPL-MCNC: 6.8 G/DL
PROTEIN URINE: NORMAL
PSA SERPL-MCNC: 2 NG/ML
RBC # BLD: 5.07 M/UL
RBC # FLD: 12.5 %
RED BLOOD CELLS URINE: 11 /HPF
SODIUM SERPL-SCNC: 140 MMOL/L
SPECIFIC GRAVITY URINE: 1.02
SQUAMOUS EPITHELIAL CELLS: 0 /HPF
TRIGL SERPL-MCNC: 110 MG/DL
TSH SERPL-ACNC: 2.47 UIU/ML
UROBILINOGEN URINE: NORMAL
WBC # FLD AUTO: 4.63 K/UL
WHITE BLOOD CELLS URINE: 1 /HPF

## 2023-03-28 ENCOUNTER — APPOINTMENT (OUTPATIENT)
Dept: PHYSICAL MEDICINE AND REHAB | Facility: CLINIC | Age: 78
End: 2023-03-28
Payer: MEDICARE

## 2023-03-28 ENCOUNTER — OUTPATIENT (OUTPATIENT)
Dept: OUTPATIENT SERVICES | Facility: HOSPITAL | Age: 78
LOS: 1 days | End: 2023-03-28
Payer: MEDICARE

## 2023-03-28 DIAGNOSIS — M47.812 SPONDYLOSIS WITHOUT MYELOPATHY OR RADICULOPATHY, CERVICAL REGION: ICD-10-CM

## 2023-03-28 DIAGNOSIS — K46.9 UNSPECIFIED ABDOMINAL HERNIA WITHOUT OBSTRUCTION OR GANGRENE: Chronic | ICD-10-CM

## 2023-03-28 PROCEDURE — 64490 INJ PARAVERT F JNT C/T 1 LEV: CPT

## 2023-03-28 PROCEDURE — 64491 INJ PARAVERT F JNT C/T 2 LEV: CPT

## 2023-03-28 NOTE — PROCEDURE
[de-identified] : RIGHT MEDIAL BRANCH BLOCKS WITH FLUOROSCOPIC GUIDANCE\par \par Levels injected: RIGHT C5C6 and C6C7\par \par Injectate: 0.25% Bupivacaine 1mL and 2mL Normal Saline Preservative Free\par \par Complications: None\par \par Estimated Blood Loss: None\par \par Technique: After informed consent was obtained, patient was taken to the operating room and placed on the table in the prone position. All pressure points were supported and this procedure was done with sedation provided by an anesthesiologist. The lumbar area was then exposed and prepped with betadine x3 followed by chlorhexidine and draped in a standard sterile manner. The entirety of the procedure was done under sterile technique using sterile gloves, surgical mask and cap and all medication expiration dates were verified prior to being drawn into syringes.\par \par Utilizing AP fluoroscopy the C4-C5 facet joint was identified. Utilizing a 25G 1-1/2 inch needle, 1ml of preservative free 1% lidocaine was injected for a skin wheal and anesthetized tract. \par \par Lateral fluoroscopy was then used to ensure the needle locations were not anterior/interforaminal. Following this, after negative aspiration for heme and CSF, 1 mL of the above mentioned injectate was easily injected at each of the levels without paresthesias or complications.\par  \par The patient tolerated the procedure well and was taken the phase II recovery in stable condition with bilateral lower extremity motor and sensory intact.\par

## 2023-04-11 ENCOUNTER — TRANSCRIPTION ENCOUNTER (OUTPATIENT)
Age: 78
End: 2023-04-11

## 2023-04-11 ENCOUNTER — APPOINTMENT (OUTPATIENT)
Dept: PHYSICAL MEDICINE AND REHAB | Facility: CLINIC | Age: 78
End: 2023-04-11
Payer: MEDICARE

## 2023-04-11 VITALS — DIASTOLIC BLOOD PRESSURE: 71 MMHG | SYSTOLIC BLOOD PRESSURE: 117 MMHG | HEART RATE: 68 BPM | OXYGEN SATURATION: 97 %

## 2023-04-11 PROCEDURE — 99213 OFFICE O/P EST LOW 20 MIN: CPT

## 2023-04-11 NOTE — ASSESSMENT
[FreeTextEntry1] : Mr. DUC SOTELO is a 78 year M with pain in the neck on the right. He reports chronic long standing pain due to cervical spondylosis without myelopathy. There are no myelopathic signs on today's exam. Radicular symptoms seems to have improved and he was able to come off Gabapentin without any issues. He responded to the MBBs.\par \par Patient reassured and educated on the diagnosis and treatment options. Risks and benefits of treatment and of delaying treatment discussed with patient. Risks discussed include but not limited to: progression of symptoms, worsening pain and functional status, etc.\par \par DUC SOTELO is taking blood thinners: Eliquis at this time. Risks and benefits of having injection while on blood thinners explained to the patient. Risks discussed included, but not limited to: pain, infection, bleeding requiring emergency surgery, headaches, damage to vital organs. \par \par DUC SOTELO will need to hold blood thinner for Eliquis for 3 days prior to coming in for the procedure.\par \par Patient had tried and failed conservative treatment. This includes but is not limited to: Acetaminophen, NSAIDs, muscle relaxants, neuropathic medications, physician directed home exercises and/or physical therapy for at least 8 weeks. After a thorough discussion of risks and benefits patient would like to proceed with CONFIRMATORY RIGHT C56, C67 MEDIAL BRANCH BLOCKS WITH FLUOROSCOPIC GUIDANCE WITH PLAN TO DO RADIOFREQUENCY ABLATION IN THE FUTURE.\par \par Risks and benefits of having injection discussed with patient. Risks discussed included, but not limited to: pain, infection, bleeding requiring emergency surgery, headaches, damage to vital organs, etc.\par \par Based on the history, physical exam and diagnostic findings, patient will benefit from this procedure. The goal of this procedure is to reduce pain and inflammation, improve function and range of motion and to further promote increased activity and return to previous level of functioning. The ultimate goal of performing this procedure is to improve patient's quality of life.\par \par Asking for authorization for CONFIRMATORY RIGHT C56, C67 MEDIAL BRANCH BLOCKS WITH FLUOROSCOPIC GUIDANCE WITH PLAN TO DO RADIOFREQUENCY ABLATION IN THE FUTURE to help treat patient´s pain. Patient will be scheduled for this procedure.\par \par I have personally spent a total of at least 25 minutes preparing, reviewing internal and external records, explaining, counseling, and coordinating care for this patient encounter.\par \par Patient was advised if the following symptoms develop: chills, fever, loss of bladder control, bowel incontinence or urinary retention, numbness/tingling or weakness is present in upper or lower extremities, to go to the nearest emergency room. This may be a new clinical condition not present at the time of the patient visit that may lead to paralysis and/or death. Patient advised if the above symptoms developed to also call the office immediately to inform us and to go to the nearest emergency room.\par \par This note was generated using Dragon medical dictation software. A reasonable effort had been made for proofreading its contents, but spelling mistakes or grammatical errors may still remain. If there are any questions or points of clarification needed please notify my office.\par

## 2023-04-11 NOTE — REVIEW OF SYSTEMS
[Chest Pain] : no chest pain [Shortness Of Breath] : no shortness of breath [Abdominal Pain] : no abdominal pain [Dysuria] : no dysuria [Negative] : Constitutional [FreeTextEntry9] : neck and back pains

## 2023-04-11 NOTE — PHYSICAL EXAM
[FreeTextEntry1] : General exam \par \par Constitutional: The patient appears well-developed, well-nourished, and in no apparent distress. Patient is well-groomed.  \par \par Skin: The skin is warm and dry, with normal turgor.  No rashes or lesions are noted.  \par \par Eyes: PERRL.  \par \par ENMT: Ears: Hearing is grossly within normal limits.  \par \par Neck: Supple: The neck is supple.  \par \par Respiratory: Inspection: Breathing unlabored.  \par \par Neurologic: Alert and oriented x 3. \par \par Psychiatric: Patient is cooperative and appropriate.  Mood and affect are normal.  Patient's insight is good, and memory and judgment are intact.\par \par Cervical spine\par Skin c/d/i without any erythema, swelling, effusion or tenderness

## 2023-05-05 ENCOUNTER — APPOINTMENT (OUTPATIENT)
Dept: PHYSICAL MEDICINE AND REHAB | Facility: CLINIC | Age: 78
End: 2023-05-05
Payer: MEDICARE

## 2023-05-05 ENCOUNTER — OUTPATIENT (OUTPATIENT)
Dept: OUTPATIENT SERVICES | Facility: HOSPITAL | Age: 78
LOS: 1 days | End: 2023-05-05
Payer: MEDICARE

## 2023-05-05 DIAGNOSIS — M47.812 SPONDYLOSIS WITHOUT MYELOPATHY OR RADICULOPATHY, CERVICAL REGION: ICD-10-CM

## 2023-05-05 DIAGNOSIS — K46.9 UNSPECIFIED ABDOMINAL HERNIA WITHOUT OBSTRUCTION OR GANGRENE: Chronic | ICD-10-CM

## 2023-05-05 PROCEDURE — 64490 INJ PARAVERT F JNT C/T 1 LEV: CPT

## 2023-05-05 PROCEDURE — 64491 INJ PARAVERT F JNT C/T 2 LEV: CPT

## 2023-05-05 NOTE — PROCEDURE
[de-identified] : RIGHT MEDIAL BRANCH BLOCKS WITH FLUOROSCOPIC GUIDANCE\par \par Levels injected: RIGHT C5C6 and C6C7\par \par Injectate: 0.25% Bupivacaine 1mL and 2mL Normal Saline Preservative Free\par \par Complications: None\par \par Estimated Blood Loss: None\par \par Technique: After informed consent was obtained, patient was taken to the operating room and placed on the table in the prone position. All pressure points were supported and this procedure was done with sedation provided by an anesthesiologist. The lumbar area was then exposed and prepped with betadine x3 followed by chlorhexidine and draped in a standard sterile manner. The entirety of the procedure was done under sterile technique using sterile gloves, surgical mask and cap and all medication expiration dates were verified prior to being drawn into syringes.\par \par Utilizing AP fluoroscopy the C4-C5 facet joint was identified. Utilizing a 25G 1-1/2 inch needle, 1ml of preservative free 1% lidocaine was injected for a skin wheal and anesthetized tract. \par \par Lateral fluoroscopy was then used to ensure the needle locations were not anterior/interforaminal. Following this, after negative aspiration for heme and CSF, 1 mL of the above mentioned injectate was easily injected at each of the levels without paresthesias or complications.\par  \par The patient tolerated the procedure well and was taken the phase II recovery in stable condition with bilateral lower extremity motor and sensory intact.

## 2023-05-11 ENCOUNTER — EMERGENCY (EMERGENCY)
Facility: HOSPITAL | Age: 78
LOS: 1 days | Discharge: HOME | End: 2023-05-11
Attending: EMERGENCY MEDICINE
Payer: MEDICARE

## 2023-05-11 VITALS
WEIGHT: 154.98 LBS | TEMPERATURE: 98 F | HEIGHT: 66 IN | RESPIRATION RATE: 20 BRPM | SYSTOLIC BLOOD PRESSURE: 181 MMHG | DIASTOLIC BLOOD PRESSURE: 81 MMHG | HEART RATE: 69 BPM | OXYGEN SATURATION: 98 %

## 2023-05-11 DIAGNOSIS — K46.9 UNSPECIFIED ABDOMINAL HERNIA WITHOUT OBSTRUCTION OR GANGRENE: Chronic | ICD-10-CM

## 2023-05-11 PROCEDURE — 99285 EMERGENCY DEPT VISIT HI MDM: CPT

## 2023-05-11 NOTE — ED ADULT TRIAGE NOTE - PATIENT ON (OXYGEN DELIVERY METHOD)
Had one covid booster, eligible for most recent booster   Sees derm  Referral for colonoscopy provided room air

## 2023-05-12 VITALS
RESPIRATION RATE: 18 BRPM | OXYGEN SATURATION: 97 % | TEMPERATURE: 98 F | SYSTOLIC BLOOD PRESSURE: 133 MMHG | DIASTOLIC BLOOD PRESSURE: 68 MMHG | HEART RATE: 92 BPM

## 2023-05-12 LAB
ALBUMIN SERPL ELPH-MCNC: 4.1 G/DL — SIGNIFICANT CHANGE UP (ref 3.3–5)
ALP SERPL-CCNC: 60 U/L — SIGNIFICANT CHANGE UP (ref 40–120)
ALT FLD-CCNC: 15 U/L — SIGNIFICANT CHANGE UP (ref 10–45)
ANION GAP SERPL CALC-SCNC: 10 MMOL/L — SIGNIFICANT CHANGE UP (ref 5–17)
APTT BLD: 32.6 SEC — SIGNIFICANT CHANGE UP (ref 27.5–35.5)
AST SERPL-CCNC: 18 U/L — SIGNIFICANT CHANGE UP (ref 10–40)
BASOPHILS # BLD AUTO: 0.04 K/UL — SIGNIFICANT CHANGE UP (ref 0–0.2)
BASOPHILS NFR BLD AUTO: 0.3 % — SIGNIFICANT CHANGE UP (ref 0–2)
BILIRUB SERPL-MCNC: 0.4 MG/DL — SIGNIFICANT CHANGE UP (ref 0.2–1.2)
BUN SERPL-MCNC: 20 MG/DL — SIGNIFICANT CHANGE UP (ref 7–23)
CALCIUM SERPL-MCNC: 9.3 MG/DL — SIGNIFICANT CHANGE UP (ref 8.4–10.5)
CHLORIDE SERPL-SCNC: 103 MMOL/L — SIGNIFICANT CHANGE UP (ref 96–108)
CO2 SERPL-SCNC: 29 MMOL/L — SIGNIFICANT CHANGE UP (ref 22–31)
CREAT SERPL-MCNC: 1 MG/DL — SIGNIFICANT CHANGE UP (ref 0.5–1.3)
EGFR: 77 ML/MIN/1.73M2 — SIGNIFICANT CHANGE UP
EOSINOPHIL # BLD AUTO: 0.03 K/UL — SIGNIFICANT CHANGE UP (ref 0–0.5)
EOSINOPHIL NFR BLD AUTO: 0.2 % — SIGNIFICANT CHANGE UP (ref 0–6)
GLUCOSE SERPL-MCNC: 146 MG/DL — HIGH (ref 70–99)
HCT VFR BLD CALC: 45.1 % — SIGNIFICANT CHANGE UP (ref 39–50)
HGB BLD-MCNC: 15.1 G/DL — SIGNIFICANT CHANGE UP (ref 13–17)
IMM GRANULOCYTES NFR BLD AUTO: 0.3 % — SIGNIFICANT CHANGE UP (ref 0–0.9)
INR BLD: 1.11 RATIO — SIGNIFICANT CHANGE UP (ref 0.88–1.16)
LIDOCAIN IGE QN: 44 U/L — SIGNIFICANT CHANGE UP (ref 7–60)
LYMPHOCYTES # BLD AUTO: 0.91 K/UL — LOW (ref 1–3.3)
LYMPHOCYTES # BLD AUTO: 7.2 % — LOW (ref 13–44)
MCHC RBC-ENTMCNC: 30.8 PG — SIGNIFICANT CHANGE UP (ref 27–34)
MCHC RBC-ENTMCNC: 33.5 GM/DL — SIGNIFICANT CHANGE UP (ref 32–36)
MCV RBC AUTO: 92 FL — SIGNIFICANT CHANGE UP (ref 80–100)
MONOCYTES # BLD AUTO: 0.6 K/UL — SIGNIFICANT CHANGE UP (ref 0–0.9)
MONOCYTES NFR BLD AUTO: 4.8 % — SIGNIFICANT CHANGE UP (ref 2–14)
NEUTROPHILS # BLD AUTO: 11.01 K/UL — HIGH (ref 1.8–7.4)
NEUTROPHILS NFR BLD AUTO: 87.2 % — HIGH (ref 43–77)
NRBC # BLD: 0 /100 WBCS — SIGNIFICANT CHANGE UP (ref 0–0)
PLATELET # BLD AUTO: 161 K/UL — SIGNIFICANT CHANGE UP (ref 150–400)
POTASSIUM SERPL-MCNC: 4.2 MMOL/L — SIGNIFICANT CHANGE UP (ref 3.5–5.3)
POTASSIUM SERPL-SCNC: 4.2 MMOL/L — SIGNIFICANT CHANGE UP (ref 3.5–5.3)
PROT SERPL-MCNC: 6.8 G/DL — SIGNIFICANT CHANGE UP (ref 6–8.3)
PROTHROM AB SERPL-ACNC: 12.9 SEC — SIGNIFICANT CHANGE UP (ref 10.5–13.4)
RBC # BLD: 4.9 M/UL — SIGNIFICANT CHANGE UP (ref 4.2–5.8)
RBC # FLD: 11.9 % — SIGNIFICANT CHANGE UP (ref 10.3–14.5)
SODIUM SERPL-SCNC: 142 MMOL/L — SIGNIFICANT CHANGE UP (ref 135–145)
WBC # BLD: 12.63 K/UL — HIGH (ref 3.8–10.5)
WBC # FLD AUTO: 12.63 K/UL — HIGH (ref 3.8–10.5)

## 2023-05-12 PROCEDURE — 93005 ELECTROCARDIOGRAM TRACING: CPT

## 2023-05-12 PROCEDURE — 80053 COMPREHEN METABOLIC PANEL: CPT

## 2023-05-12 PROCEDURE — 74177 CT ABD & PELVIS W/CONTRAST: CPT | Mod: 26,MA

## 2023-05-12 PROCEDURE — 85610 PROTHROMBIN TIME: CPT

## 2023-05-12 PROCEDURE — 99285 EMERGENCY DEPT VISIT HI MDM: CPT | Mod: 25

## 2023-05-12 PROCEDURE — 85730 THROMBOPLASTIN TIME PARTIAL: CPT

## 2023-05-12 PROCEDURE — 85025 COMPLETE CBC W/AUTO DIFF WBC: CPT

## 2023-05-12 PROCEDURE — 36415 COLL VENOUS BLD VENIPUNCTURE: CPT

## 2023-05-12 PROCEDURE — 83690 ASSAY OF LIPASE: CPT

## 2023-05-12 PROCEDURE — 74177 CT ABD & PELVIS W/CONTRAST: CPT | Mod: MA

## 2023-05-12 PROCEDURE — 96375 TX/PRO/DX INJ NEW DRUG ADDON: CPT

## 2023-05-12 PROCEDURE — 96374 THER/PROPH/DIAG INJ IV PUSH: CPT | Mod: XU

## 2023-05-12 RX ORDER — ACETAMINOPHEN 500 MG
1000 TABLET ORAL ONCE
Refills: 0 | Status: COMPLETED | OUTPATIENT
Start: 2023-05-12 | End: 2023-05-12

## 2023-05-12 RX ORDER — ONDANSETRON 8 MG/1
4 TABLET, FILM COATED ORAL ONCE
Refills: 0 | Status: COMPLETED | OUTPATIENT
Start: 2023-05-12 | End: 2023-05-12

## 2023-05-12 RX ADMIN — ONDANSETRON 4 MILLIGRAM(S): 8 TABLET, FILM COATED ORAL at 03:47

## 2023-05-12 RX ADMIN — Medication 400 MILLIGRAM(S): at 03:47

## 2023-05-12 NOTE — ED PROVIDER NOTE - OBJECTIVE STATEMENT
78-year-old male with past medical history of CAD with stents, A-fib on Eliquis presents emergency department for 1 day of abdominal pain and bloating.  Patient states he started having abdominal discomfort increasing bloating over the last 24 hours.  Patient states last bowel movement was earlier today.  Patient also endorses nausea and 1 episode of nonbloody nonbilious vomiting.  Patient states she tried taking Pepto-Bismol without relief of symptoms.  Patient denies any past abdominal surgeries.  Patient denies fevers, chills, headache, chest pain, trouble breathing, diarrhea/constipation, dysuria, hematuria.

## 2023-05-12 NOTE — ED ADULT NURSE NOTE - OBJECTIVE STATEMENT
77 YO male with PMH  afib, cardiac stents   via walk in presenting with complaints of  abdominal pain. pt reports 2 days of abdominal  pain associated with  nausea and 1 episode of non bloody emesis. reports last bowel movement was yesterday. Denies fevers, diarrhea, cp, SOB  Pt Axox4, gross neuro intact, mm. respirations even, & non-labored Abdomen soft, non-tender, non-distended. Skin warm, dry, and intact. Pt placed in position of comfort. Pt educated on call bell system and provided call bell. Bed in lowest position, wheels locked, appropriate side rails raised. Pt denies needs at this time.

## 2023-05-12 NOTE — ED PROVIDER NOTE - CLINICAL SUMMARY MEDICAL DECISION MAKING FREE TEXT BOX
78-year-old male with past medical history of CAD with stents, A-fib on Eliquis presents emergency department for 1 day of abdominal pain and bloating.  1 episode of NBNB vomiting. Abd distended and diffusely TTP. Differentials include but not limited to SBO, gastritis, UTI, viral illness. Plan for labs, CT, analgesia. Dispo pending lab and imaging results.

## 2023-05-12 NOTE — ED ADULT NURSE REASSESSMENT NOTE - NS ED NURSE REASSESS COMMENT FT1
Pt provided with crackers and apple juice as per MD Sales for PO challenge. Pt tolerated well. Denies abd pain, n/v/d, pain/discomfort after eating. MD Sales made aware

## 2023-05-12 NOTE — ED PROVIDER NOTE - NSFOLLOWUPINSTRUCTIONS_ED_ALL_ED_FT
You were seen in the ED today for abdominal pain and bloating.    Your work up included labs and a cat scan. Your results are included in your paperwork.    You pain and symptoms are likely due to gastroenteritis.     Take Motrin 600mg every 8hrs with food for pain.    If you experience any of the following please return to the ED:  - Severe abdominal pain  - Blood in vomit or stool  - Inability to tolerate food or water  - Chest pain  - Trouble breathing

## 2023-05-12 NOTE — ED PROVIDER NOTE - PATIENT PORTAL LINK FT
You can access the FollowMyHealth Patient Portal offered by [facility] by registering at the following website: [email or web address]. By joining [facility]’s FollowMyHealth portal, you will also be able to view your health information using other applications (apps) compatible with our system.

## 2023-05-12 NOTE — ED PROVIDER NOTE - PROGRESS NOTE DETAILS
Sarika Gerardo PGY1: CT negative for acute pathology. Pt reassessed and resting comfortably. Pt able to tolerate PO and states pain has improved after medication. Pt okay for dc at this time.

## 2023-05-12 NOTE — ED PROVIDER NOTE - PHYSICAL EXAMINATION
Constitutional: VS reviewed. Alert and orientedx3, well appearing, no apparent distress  HEENT: Atraumatic, EOMI  CV: RRR  Lungs: Clear and equal bilaterally, no wheezes, rales or crackles  Abdomen: Soft, distended, diffusely TTP   MSK: No deformities  Skin: Warm and dry. As visualized no rashes, lesions, bruising or erythema  Lymph: Trace pitting edema in extremities.

## 2023-05-12 NOTE — ED PROVIDER NOTE - ATTENDING CONTRIBUTION TO CARE
Attending MD Barnes:  I personally have seen and examined this patient. I have performed a substantive portion of the visit including all aspects of the medical decision making.  Resident note reviewed and agree on plan of care and except where noted.      78-year-old gentleman with a history of CAD with stents, A-fib on Eliquis presenting for evaluation of 1 day of abdominal pain nausea and vomiting.  Patient states that the pain is in the mid abdomen and does not radiate.  It is like an acid sensation as well as crampy.  He has no flank or back pain.  No fevers or chills.  He had a normal bowel movement earlier today.  He has not taken any over-the-counter medications as of yet for his symptoms.  He rates his current pain an 8/10.  Patient denies any abdominal surgical history.    Vital signs notable for elevated blood pressure 180 systolic otherwise nonactionable.  The patient is lying in the stretcher in no apparent distress.  Abdominal examination notable for soft abdomen with moderate distention.  Somewhat hypoactive bowel sounds.  No focal abdominal tenderness.  No rebound or guarding.  Extremities warm and well-perfused.  Affect normal.    Differential diagnosis includes but is not limited to SBO, colitis, diverticulitis, pancreatitis, PUD/gastritis.  We will obtain lab work including lipase, CT.  Patient was offered analgesia and antiemetics but he declined.          *The above represents an initial assessment/impression. Please refer to progress notes for potential changes in patient clinical course*

## 2023-05-18 ENCOUNTER — APPOINTMENT (OUTPATIENT)
Dept: PHYSICAL MEDICINE AND REHAB | Facility: CLINIC | Age: 78
End: 2023-05-18
Payer: MEDICARE

## 2023-05-18 VITALS
SYSTOLIC BLOOD PRESSURE: 116 MMHG | DIASTOLIC BLOOD PRESSURE: 69 MMHG | TEMPERATURE: 97.7 F | HEART RATE: 95 BPM | OXYGEN SATURATION: 98 %

## 2023-05-18 PROCEDURE — 99213 OFFICE O/P EST LOW 20 MIN: CPT

## 2023-05-18 NOTE — REVIEW OF SYSTEMS
[Chest Pain] : no chest pain [Shortness Of Breath] : no shortness of breath [Abdominal Pain] : no abdominal pain [Dysuria] : no dysuria [Negative] : Eyes [FreeTextEntry9] : neck pain

## 2023-05-18 NOTE — ASSESSMENT
[FreeTextEntry1] : Mr. DUC SOTELO is a 78 year M with pain in the neck on the right. He reports chronic long standing pain due to cervical spondylosis without myelopathy. He responded extremely well to the MBBs x2. Pain is near 0/10 at this time. Will hold off RFA for now. When pain returns will do one side at a time.\par \par Patient reassured and educated on the diagnosis and treatment options. Risks and benefits of treatment and of delaying treatment discussed with patient. Risks discussed include but not limited to: progression of symptoms, worsening pain and functional status, etc.\par \par DUC SOTELO is taking blood thinners: Eliquis at this time. Risks and benefits of having injection while on blood thinners explained to the patient. Risks discussed included, but not limited to: pain, infection, bleeding requiring emergency surgery, headaches, damage to vital organs. \par \par DUC SOTELO will need to hold blood thinner for Eliquis for 3 days prior to coming in for the procedure.\par \par I have personally spent a total of at least 25 minutes preparing, reviewing internal and external records, explaining, counseling, and coordinating care for this patient encounter.\par \par Follow up PRN\par \par Patient was advised if the following symptoms develop: chills, fever, loss of bladder control, bowel incontinence or urinary retention, numbness/tingling or weakness is present in upper or lower extremities, to go to the nearest emergency room. This may be a new clinical condition not present at the time of the patient visit that may lead to paralysis and/or death. Patient advised if the above symptoms developed to also call the office immediately to inform us and to go to the nearest emergency room.\par \par This note was generated using Dragon medical dictation software. A reasonable effort had been made for proofreading its contents, but spelling mistakes or grammatical errors may still remain. If there are any questions or points of clarification needed please notify my office.

## 2023-05-18 NOTE — PHYSICAL EXAM
[FreeTextEntry1] : General exam \par \par Constitutional: The patient appears well-developed, well-nourished, and in no apparent distress. Patient is well-groomed.  \par \par Skin: The skin is warm and dry, with normal turgor.  No rashes or lesions are noted.  \par \par Eyes: PERRL.  \par \par ENMT: Ears: Hearing is grossly within normal limits.  \par \par Neck: Supple: The neck is supple.  \par \par Respiratory: Inspection: Breathing unlabored.  \par \par Neurologic: Alert and oriented x 3. \par \par Psychiatric: Patient is cooperative and appropriate.  Mood and affect are normal.  Patient's insight is good, and memory and judgment are intact.\par \par Cervical\par Skin c/d/i without any erythema, swelling, effusion or tenderness

## 2023-05-18 NOTE — HISTORY OF PRESENT ILLNESS
[FreeTextEntry1] : DUC SOTELO had CONFIRMATORY RIGHT C56, C67 MEDIAL BRANCH BLOCKS WITH FLUOROSCOPIC GUIDANCE on 5/5/23. Today patient is here for follow up. Patient reports greater than 80% reduction in pain as tested by the NRS pain scale that is still lasting. Pain is a 1/10 today. Patient also reports improvement in quality of life. In addition, patient reports improvement of function and ADLs along with a temporary decrease in pain medication use. He is very surprised and happy because he hasn't felt this way in a long time.\par \par Denies any new pain, numbness or weakness, bowel/bladder dysfunction, saddle anesthesia, fevers, chills, weight loss, night pain, or night sweats at this time.\par

## 2023-05-19 ENCOUNTER — TRANSCRIPTION ENCOUNTER (OUTPATIENT)
Age: 78
End: 2023-05-19

## 2023-05-23 ENCOUNTER — APPOINTMENT (OUTPATIENT)
Dept: INTERNAL MEDICINE | Facility: CLINIC | Age: 78
End: 2023-05-23
Payer: MEDICARE

## 2023-05-23 PROCEDURE — 99442: CPT

## 2023-05-23 NOTE — ASSESSMENT
[FreeTextEntry1] : \par 79 yo M pmhx HTN, HLD, afib (on ELiquis), CAD s/p stents, preDM, anxiety/depression, FL, diverticulosis, glaucoma, BPH, lung nodules for f/u\par \par hx gen abdominal pain, constipation- s/p ER 5/12/22, no acute findings (+diverticulosis).  Improved some, but now c/o different right abdominal pain\par -advised in office medical evaluation for further management, wishes to to go UC today.\par -advised prompt ER evaluation if symptoms worsen or new symptoms arise\par \par left renal lesion- noted incidentally on CT scan in ER 5/22\par -check US renal\par - f/u pending 6/8/23, advised to discuss findings\par \par hx microscopic hematuria, BPH- stable per pt\par -off myrbetriq 2/2 constipation\par -self d/c'd oxybutynin since  visit as did not like taking it- declines medication use at this time\par -3/23 bmp/PSA, UA +rbc, Ucx unremarkable \par -followed by , seen 3/22 with Trospium added (use pending) to Flomax BID.  F/U pending 6/8/23.\par -advised to f/u if sx's worsen, dysuria, hesitancy, etc.\par \par anxiety/depression-reports stable, mild on PHQ-9, denies acute sx's\par -on lorazepam prn, [facility]ill [facility] 5[facility] (states has pills until then)\par -hx considering Ketamine program for mood that had read about in [place] times- has spoken to his cardiologist who advised against it due to potential cardiac SEs and since he has decided to not pursue it.\par -declines  referral\par -advised to f/u if sx's worsen\par \par afib, CAD hx stents (last 10/20), HTN, HLD- 11/22 Tchol 142  HDL 43 LDL 68; 3/23 LFTs wnl.  Asx.\par -hx self d/c'd Lovaza 1/14 due to cost (no hx adverse rxn)\par -hx cardiac cath 10/20- noted high grade mid LAD lesion with stent placed, noted patent RCA and OM stents. \par -followed by cardio (Dr. Alcocer, [facility]) \par  Asked to forward records.  \par -off pravastatin 2/2 diarrhea with use\par -on Crestor, tolerating w/o SEs\par -off Plavix\par -off ramipril per cardio (with BB started for afib per pt)\par -on ASA and metoprolol 25 qd\par -3/23 cmp/TSH, screening UA no prt\par -advised of prompt medical eval need if sx onset (ie dizziness, CP, palpitations, sob, etc)\par \par preDM- 3/23 A1c 5.8 (was 5.8)\par -ADA diet and exercise counseled\par \par \par HCM\par -hx CPE 3/23\par -1/22 hep C screening negative\par -hx flu shot 9/22\par \par Pt's cell: [phone]\par

## 2023-05-23 NOTE — END OF VISIT
Patient presents to ED with complaint of lower abdominal pain and lower back pain. Patient states she was treated for cervical cancer last year. Patient states she has had lower abdominal pain for a while and was diagnosed recently with colitis. Patient states she has a steroid to take for treatment. Patient states lower back pain comes and goes. Patient proved instructions for clean catch urine.      Juliette Solis RN  12/03/20 0613 [Time Spent: ___ minutes] : I have spent [unfilled] minutes of time on the encounter.

## 2023-05-23 NOTE — DATA REVIEWED
[FreeTextEntry1] : 3/28/23 labs reviewed\par \par [facility]\par \par 5/12/23\par cbc wnl, except wbc 12\par cmp wnl\par lipase 44, nl\par \par CT A/P: \par -No evidence of small bowel obstruction.  \par -Diverticulosis without evidence of acute diverticulitis.\par -Complex left renal lesion for which differential consideration includes hemorrhagic or proteinaceous cyst.  Nonemergent evaluation with renal ultrasound or MRI can be performed if clinically warranted.\par -Bilateral renal cysts\par

## 2023-05-23 NOTE — HISTORY OF PRESENT ILLNESS
[Home] : at home, [unfilled] , at the time of the visit. [Medical Office: ([facility])___] : at the medical office located in  [Verbal consent obtained from patient] : the patient, [unfilled] [FreeTextEntry1] : \par f/u [de-identified] : \par As this is a telemedicine visit, no physical exam could be performed.  Diagnosis and treatment is based on symptoms provided.\par \par 77 yo M pmhx HTN, HLD, CAD s/p stents, preDM, anxiety/depression, FL, glaucoma, BPH, lung nodules for f/u\par \par Last seen 3/23/23 for AWV with labs done.\par \par cc: med refill\par \par c/o abdominal pain\par \par States 10d ago had severe gen abdominal "gas " pain a/w constipation few days prior, went to [facility] with CT done- told was "very constipated".\par -had labs, told nl\par -no meds given while in ER\par -d/c'd after few hours  - no Rx's given on d/c, advised Tylenol for pain\par \par States feeling slightly better since ER.\par States abdominal pain had improved, but seems to be worse since overnight- mainly at right side of abdomen now, on/off 7/10\par constipation better with using Dulcolax prn- last 2d ago, last BM today soft.  No BRBPR or melena.\par \par Denies fever, chills, n/v, dizziness, CP or sob.\par \par Reports no new  issues, still gets frequency on/off as prior.\par  appt pending 6/8/23.\par

## 2023-05-26 ENCOUNTER — LABORATORY RESULT (OUTPATIENT)
Age: 78
End: 2023-05-26

## 2023-05-26 ENCOUNTER — APPOINTMENT (OUTPATIENT)
Dept: INTERNAL MEDICINE | Facility: CLINIC | Age: 78
End: 2023-05-26
Payer: MEDICARE

## 2023-05-26 ENCOUNTER — APPOINTMENT (OUTPATIENT)
Dept: PHYSICAL MEDICINE AND REHAB | Facility: CLINIC | Age: 78
End: 2023-05-26
Payer: MEDICARE

## 2023-05-26 VITALS — HEART RATE: 76 BPM | OXYGEN SATURATION: 98 % | SYSTOLIC BLOOD PRESSURE: 113 MMHG | DIASTOLIC BLOOD PRESSURE: 69 MMHG

## 2023-05-26 VITALS
HEIGHT: 66 IN | TEMPERATURE: 98.7 F | DIASTOLIC BLOOD PRESSURE: 58 MMHG | SYSTOLIC BLOOD PRESSURE: 106 MMHG | RESPIRATION RATE: 16 BRPM | HEART RATE: 68 BPM | BODY MASS INDEX: 24.75 KG/M2 | OXYGEN SATURATION: 97 % | WEIGHT: 154 LBS

## 2023-05-26 DIAGNOSIS — K57.90 DIVERTICULOSIS OF INTESTINE, PART UNSPECIFIED, W/OUT PERFORATION OR ABSCESS W/OUT BLEEDING: ICD-10-CM

## 2023-05-26 PROCEDURE — 99213 OFFICE O/P EST LOW 20 MIN: CPT

## 2023-05-26 PROCEDURE — 99214 OFFICE O/P EST MOD 30 MIN: CPT | Mod: 25

## 2023-05-26 PROCEDURE — 36415 COLL VENOUS BLD VENIPUNCTURE: CPT

## 2023-05-26 RX ORDER — GABAPENTIN 100 MG/1
100 CAPSULE ORAL 3 TIMES DAILY
Qty: 90 | Refills: 2 | Status: DISCONTINUED | COMMUNITY
Start: 2022-11-21 | End: 2023-05-26

## 2023-05-26 NOTE — HISTORY OF PRESENT ILLNESS
[FreeTextEntry1] : DUC SOTELO is here for follow up. Last visit patient had 100% relief after CONFIRMATORY RIGHT C56, C67 MEDIAL BRANCH BLOCKS WITH FLUOROSCOPIC GUIDANCE on 5/5/23. He had 0/10 pain on NRS last visit and he wished to wait to have the RFA on that visit. However the pain has now returned and he is asking for RFA. Pain is 7/10 on NRS.\par \par Denies any new pain, numbness or weakness, bowel/bladder dysfunction, saddle anesthesia, fevers, chills, weight loss, night pain, or night sweats at this time.\par \par From last visit on 5/18/23:\par \par DUC SOTELO had CONFIRMATORY RIGHT C56, C67 MEDIAL BRANCH BLOCKS WITH FLUOROSCOPIC GUIDANCE on 5/5/23. Today patient is here for follow up. Patient reports greater than 80% reduction in pain as tested by the NRS pain scale that is still lasting. Pain is a 1/10 today. Patient also reports improvement in quality of life. In addition, patient reports improvement of function and ADLs along with a temporary decrease in pain medication use. He is very surprised and happy because he hasn't felt this way in a long time.\par \par Denies any new pain, numbness or weakness, bowel/bladder dysfunction, saddle anesthesia, fevers, chills, weight loss, night pain, or night sweats at this time.

## 2023-05-26 NOTE — REVIEW OF SYSTEMS
[Negative] : Integumentary [FreeTextEntry7] : see HPI [FreeTextEntry8] : see HPI [FreeTextEntry9] : see HPI [de-identified] : see HPI [de-identified] : see HPI

## 2023-05-26 NOTE — ASSESSMENT
[FreeTextEntry1] : \par 77 yo M pmhx HTN, HLD, afib (on ELiquis), CAD s/p stents, preDM, anxiety/depression, FL, diverticulosis, glaucoma, BPH, lung nodules for f/u\par \par hx gen abdominal pain, constipation- s/p ER 5/12/22, no acute findings (+diverticulosis), elevated wbc on labs.  Improved some, but now c/o different right abdominal pain that persists.  VSS, nontoxic appearing\par -will give empiric Augmentin, risks/benefits/SEs discussed\par -check CT A/P r/o diverticulitis\par -check cbc/cmp, UA/Ucx\par -GI referral (needs new as prior retired)\par -advised prompt ER evaluation if symptoms worsen or new symptoms arise\par -to f/u in 1 week for reevaluation\par \par left renal lesion- noted incidentally on CT scan in ER 5/22\par -US renal pending 5/23\par - f/u pending 6/8/23, advised to discuss findings\par \par hx microscopic hematuria, BPH- stable per pt\par -off myrbetriq 2/2 constipation\par -self d/c'd oxybutynin since  visit as did not like taking it- declines medication use at this time\par -3/23 bmp/PSA, UA +rbc, Ucx unremarkable \par -followed by , seen 3/22 with Trospium added (use pending) to Flomax BID.  F/U pending 6/8/23.\par \par anxiety/depression-reports stable, mild on PHQ-9, denies acute sx's\par -on lorazepam prn, iSTOP checked\par -hx considering Ketamine program for mood that had read about in [place] times- has spoken to his cardiologist who advised against it due to potential cardiac SEs and since he has decided to not pursue it.\par -declines  referral\par -advised to f/u if sx's worsen\par \par afib, CAD hx stents (last 10/20), HTN, HLD- 3/23 Tchol 139  HDL 43 LDL 74; 3/23 LFTs wnl.  Asx.\par -hx self d/c'd Lovaza 1/14 due to cost (no hx adverse rxn)\par -hx cardiac cath 10/20- noted high grade mid LAD lesion with stent placed, noted patent RCA and OM stents. \par -followed by cardio (Dr. Alcocer, [facility]) - has f/u 6/23.  Asked to forward records.  \par -off pravastatin 2/2 diarrhea with use\par -on Crestor, tolerating w/o SEs\par -off Plavix\par -off ramipril per cardio (with BB started for afib per pt)\par -on ASA and metoprolol 25 qd\par -3/23 cmp/TSH, screening UA no prt\par -advised of prompt medical eval need if sx onset (ie dizziness, CP, palpitations, sob, etc)\par \par preDM- 3/23 A1c 5.8 (was 5.8)\par -ADA diet and exercise counseled\par \par \par HCM\par -hx CPE 3/23\par -1/22 hep C screening negative\par -hx flu shot 9/22\par \par Pt's cell: [phone]\par \par Labs drawn in office today.\par \par

## 2023-05-26 NOTE — HISTORY OF PRESENT ILLNESS
[FreeTextEntry1] : \par f/u [de-identified] : \par 79 yo M pmhx HTN, HLD, CAD s/p stents, preDM, anxiety/depression, FL, glaucoma, BPH, lung nodules for f/u\par \par Last seen in office 3/23/23 for AWV with labs done.\par \par Last encounter 5/23/23 via virtual visit c/o RLQ abdominal pain s/p ER visit 5/12/23 with constipation and gen abdominal pain\par -advised UC eval\par \par Reports RLQ pain stable since last visit.\par Did not go to UC as advised, no clear reason.\par \par States had severe gen abdominal "gas " pain a/w constipation few days prior, went to [facility] 5/12/23 with CT done- told was "very constipated".\par -had labs, told nl\par -no meds given while in ER\par -d/c'd after few hours  - no Rx's given on d/c, advised Tylenol for pain\par \par States since ER abdominal pain had improved, but seems to be worse since overnight 5/22/23- mainly located at right lower abdomen , on/off 6-7/10, focal sharp discomfort.  Relieved with BM.  States onset noted if laying on that side to sleep.\par \par hx constipation- feels is better with using Dulcolax prn (last 2d ago).  Also daily prune juice with help.  \par Last BM yesterday- soft,  no BRBPR or melena.  No BM yet today, but  is passing gas today. \par \par Denies fever, chills, n/v, dizziness, CP or sob.\par \par afib, CAD hx stents (last 10/20), HTN, HLD- \par -followed by cardio (Dr. Alcocer, [facility]) - has f/u 6/23.  \par -on Crestor, tolerating w/o SEs\par -on ASA and metoprolol 25 qd\par \par Reports no new  issues, still gets frequency on/off as prior.\par  appt pending 6/8/23.\par \par anxiety/depression-reports stable, denies HI/SI or panic attacks\par -on lorazepam prn, denies SEs\par \par hx cervical radiculopathy- has had 2 injections so far by PMR, #3 pending (no date set yet) as advised at f/u today.  Notes improved sx's with injections, but still on/off sx's\par -on Tylenol prn, not needed regularly \par

## 2023-05-26 NOTE — PHYSICAL EXAM
[FreeTextEntry1] : General exam \par \par Constitutional: The patient appears well-developed, well-nourished, and in no apparent distress. Patient is well-groomed. \par \par Skin: The skin is warm and dry, with normal turgor. No rashes or lesions are noted. \par \par Eyes: PERRL. \par \par ENMT: Ears: Hearing is grossly within normal limits. \par \par Neck: Supple: The neck is supple. \par \par Respiratory: Inspection: Breathing unlabored. \par \par Neurologic: Alert and oriented x 3. \par \par Psychiatric: Patient is cooperative and appropriate. Mood and affect are normal. Patient's insight is good, and memory and judgment are intact.\par \par CERVICAL EXAM\par \par APPEARANCE:\par No visible scars\par Left upper shoulder posterior area with bandage in place from recent basal cell carcinoma resection\par No gross deformity or malalignment\par No erythema, swelling or ecchymosis\par Normal temperature to touch\par \par TENDERNESS:\par +trigger points over bilateral trapezius muscles\par Absent over midline spinous processes\par Absent over left cervical facet joints\par +over right cervical facet joints\par +over right cervical paraspinal muscles and Trapezius\par \par ROM:\par Limited AROM of the cervical spine\par LImited PROM of the cervical spine in all directions\par Limited PROM of the RIGHT shoulder in all directions\par No pain with flexion, extension of the cervical spine\par \par SPECIAL TESTS:\par Negative Spurling test\par Negative  Spurling test\par Normal cervical compression test\par \par SENSORY TESTING:\par Intact to light touch Left C3-T2\par Intact to light touch Right C3-T2\par \par MOTOR TESTING:\par Muscle tone of the left upper extremity is normal\par Muscle tone of the right upper extremity is normal\par \par Hand  strength Left 5/5\par Hand  strength Right 4/5\par \par Finger abductor strength Left 5/5\par Finger abductor strength Right 5/5\par \par Elbow flexion strength Left 5/5\par Elbow flexion strength Right 5/5\par \par Elbow extension strength Left 5/5\par Elbow extension strength Right 5/5\par \par Shoulder flexion strength Left 5/5\par Shoulder flexion strength Right 5/5\par \par Shoulder extension strength Left 5/5\par Shoulder extension strength Right 5/5\par \par Shoulder abduction strength Left 5/5\par Shoulder abduction strength Right 5/5\par \par REFLEXES:\par Ortiz sign left +\par Ortiz sign right +\par \par GAIT:\par Non-antalgic gait\par Balance normal with ambulation.

## 2023-05-26 NOTE — REVIEW OF SYSTEMS
[Hearing Loss] : loss of hearing [Chest Pain] : no chest pain [Shortness Of Breath] : no shortness of breath [Abdominal Pain] : no abdominal pain [Dysuria] : no dysuria [Negative] : Eyes [FreeTextEntry9] : neck pains

## 2023-05-26 NOTE — PHYSICAL EXAM
[No Acute Distress] : no acute distress [Well-Appearing] : well-appearing [Normal Sclera/Conjunctiva] : normal sclera/conjunctiva [PERRL] : pupils equal round and reactive to light [EOMI] : extraocular movements intact [Normal Outer Ear/Nose] : the outer ears and nose were normal in appearance [Normal Oropharynx] : the oropharynx was normal [No Lymphadenopathy] : no lymphadenopathy [Supple] : supple [No Respiratory Distress] : no respiratory distress  [Clear to Auscultation] : lungs were clear to auscultation bilaterally [Normal Rate] : normal rate  [Regular Rhythm] : with a regular rhythm [Normal S1, S2] : normal S1 and S2 [No Murmur] : no murmur heard [No Edema] : there was no peripheral edema [Soft] : abdomen soft [No Masses] : no abdominal mass palpated [No HSM] : no HSM [No CVA Tenderness] : no CVA  tenderness [No Spinal Tenderness] : no spinal tenderness [No Joint Swelling] : no joint swelling [Grossly Normal Strength/Tone] : grossly normal strength/tone [No Rash] : no rash [No Focal Deficits] : no focal deficits [Normal Gait] : normal gait [de-identified] : mild distension, mild diffuse RLQ pain with deep palpation, no rebound or guarding, +reducible umbilical hernia w/o focal pain

## 2023-05-26 NOTE — HEALTH RISK ASSESSMENT
[1] : 1) Little interest or pleasure doing things for several days (1) [0] : 2) Feeling down, depressed, or hopeless: Not at all (0) [PHQ-2 Negative - No further assessment needed] : PHQ-2 Negative - No further assessment needed [1/2 of Days or More (2)] : 3.) Trouble falling asleep, or sleeping too much? Half the days or more [Several Days (1)] : 7.) Trouble concentrating on things, such as reading a newspaper or watching television? Several days [Not at All (0)] : 8.) Moving or speaking so slowly that other people could have noticed, or the opposite, moving or speaking faster than usual? Not at all [Mild] : severity of depression is mild [Somewhat Difficult] : How difficult have these problems made it for you to do your work, take care of things at home, or get along with people? Somewhat difficult [I have developed a follow-up plan documented below in the note.] : I have developed a follow-up plan documented below in the note. [[id number]] : 1 [[id number]] : 6

## 2023-05-26 NOTE — ASSESSMENT
[FreeTextEntry1] : Mr. DUC SOTELO is a 78 year M with pain in the neck on the right. He reports chronic long standing pain due to cervical spondylosis without myelopathy. He responded extremely well to the MBBs x2 and wishes to have RFA.\par \par Patient reassured and educated on the diagnosis and treatment options. Risks and benefits of treatment and of delaying treatment discussed with patient. Risks discussed include but not limited to: progression of symptoms, worsening pain and functional status, etc.\par \par DUC SOTELO is taking blood thinners: Eliquis at this time. Risks and benefits of having injection while on blood thinners explained to the patient. Risks discussed included, but not limited to: pain, infection, bleeding requiring emergency surgery, headaches, damage to vital organs. \par \par DUC SOTELO will need to hold blood thinner for Eliquis for 3 days prior to coming in for the procedure.\par \par Patient had tried and failed conservative treatment. This includes but is not limited to: Acetaminophen, NSAIDs, muscle relaxants, neuropathic medications, physician directed home exercises and/or physical therapy for at least 8 weeks. After a thorough discussion of risks and benefits patient would like to proceed with RIGHT C56, C67 MEDIAL BRANCH RADIOFREQUENCY ABLATION WITH FLUOROSCOPIC GUIDANCE.\par \par Risks and benefits of having injection discussed with patient. Risks discussed included, but not limited to: pain, infection, bleeding requiring emergency surgery, headaches, damage to vital organs, etc.\par \par Based on the history, physical exam and diagnostic findings, patient will benefit from this procedure. The goal of this procedure is to reduce pain and inflammation, improve function and range of motion and to further promote increased activity and return to previous level of functioning. The ultimate goal of performing this procedure is to improve patient's quality of life.\par \par Asking for authorization for RIGHT C56, C67 MEDIAL BRANCH RADIOFREQUENCY ABLATION WITH FLUOROSCOPIC GUIDANCE to help treat patient´s pain. Patient will be scheduled for this procedure.\par \par I have personally spent a total of at least 25 minutes preparing, reviewing internal and external records, explaining, counseling, and coordinating care for this patient encounter.\par \par Patient was advised if the following symptoms develop: chills, fever, loss of bladder control, bowel incontinence or urinary retention, numbness/tingling or weakness is present in upper or lower extremities, to go to the nearest emergency room. This may be a new clinical condition not present at the time of the patient visit that may lead to paralysis and/or death. Patient advised if the above symptoms developed to also call the office immediately to inform us and to go to the nearest emergency room.\par \par This note was generated using Dragon medical dictation software. A reasonable effort had been made for proofreading its contents, but spelling mistakes or grammatical errors may still remain. If there are any questions or points of clarification needed please notify my office.

## 2023-05-26 NOTE — END OF VISIT
[FreeTextEntry1] : 76 -year-old man with history of ALS has obstructive sleep apnea.\par \par Patient has been on astral machine, he is slowly deteriorating. Requires positive pressure more often.    Patient is not waking up with shortness of breath episodes at night, his nocturia has improved significantly.  He is able to lay flat now with this machine.\par \par Today I talked to judith and his wife the possibility of the pulmonary status getting worse. I suggested they discuss about not going on ventilator if the condition worsens. [Time Spent: ___ minutes] : I have spent [unfilled] minutes of time on the encounter.

## 2023-05-28 LAB
ALBUMIN SERPL ELPH-MCNC: 3.6 G/DL
ALP BLD-CCNC: 85 U/L
ALT SERPL-CCNC: 45 U/L
ANION GAP SERPL CALC-SCNC: 12 MMOL/L
APPEARANCE: CLEAR
AST SERPL-CCNC: 40 U/L
BACTERIA UR CULT: NORMAL
BILIRUB SERPL-MCNC: 0.4 MG/DL
BILIRUBIN URINE: NEGATIVE
BLOOD URINE: ABNORMAL
BUN SERPL-MCNC: 14 MG/DL
CALCIUM SERPL-MCNC: 9 MG/DL
CHLORIDE SERPL-SCNC: 102 MMOL/L
CO2 SERPL-SCNC: 26 MMOL/L
COLOR: NORMAL
CREAT SERPL-MCNC: 0.86 MG/DL
EGFR: 88 ML/MIN/1.73M2
GLUCOSE QUALITATIVE U: NEGATIVE MG/DL
GLUCOSE SERPL-MCNC: 122 MG/DL
KETONES URINE: NEGATIVE MG/DL
LEUKOCYTE ESTERASE URINE: NEGATIVE
NITRITE URINE: NEGATIVE
PH URINE: 5.5
POTASSIUM SERPL-SCNC: 4.4 MMOL/L
PROT SERPL-MCNC: 6.4 G/DL
PROTEIN URINE: NORMAL MG/DL
SODIUM SERPL-SCNC: 141 MMOL/L
SPECIFIC GRAVITY URINE: 1.02
UROBILINOGEN URINE: 1 MG/DL

## 2023-05-30 ENCOUNTER — APPOINTMENT (OUTPATIENT)
Dept: GASTROENTEROLOGY | Facility: CLINIC | Age: 78
End: 2023-05-30

## 2023-05-30 ENCOUNTER — APPOINTMENT (OUTPATIENT)
Dept: GASTROENTEROLOGY | Facility: CLINIC | Age: 78
End: 2023-05-30
Payer: MEDICARE

## 2023-05-30 VITALS
HEART RATE: 80 BPM | BODY MASS INDEX: 24.27 KG/M2 | WEIGHT: 151 LBS | DIASTOLIC BLOOD PRESSURE: 67 MMHG | HEIGHT: 66 IN | SYSTOLIC BLOOD PRESSURE: 123 MMHG

## 2023-05-30 DIAGNOSIS — K57.30 DIVERTICULOSIS OF LARGE INTESTINE W/OUT PERFORATION OR ABSCESS W/OUT BLEEDING: ICD-10-CM

## 2023-05-30 DIAGNOSIS — Z95.5 PRESENCE OF CORONARY ANGIOPLASTY IMPLANT AND GRAFT: ICD-10-CM

## 2023-05-30 DIAGNOSIS — Z80.0 FAMILY HISTORY OF MALIGNANT NEOPLASM OF DIGESTIVE ORGANS: ICD-10-CM

## 2023-05-30 DIAGNOSIS — Z86.010 PERSONAL HISTORY OF COLONIC POLYPS: ICD-10-CM

## 2023-05-30 DIAGNOSIS — R10.31 RIGHT LOWER QUADRANT PAIN: ICD-10-CM

## 2023-05-30 PROCEDURE — 82274 ASSAY TEST FOR BLOOD FECAL: CPT | Mod: QW

## 2023-05-30 PROCEDURE — 99204 OFFICE O/P NEW MOD 45 MIN: CPT | Mod: 25

## 2023-05-30 NOTE — HISTORY OF PRESENT ILLNESS
[FreeTextEntry1] : Ricky had noted new onset abdominal pain earlier this month, with constipation, bloating, and burping.  CT scan abdomen/pelvis on 5/12 revealed 1.6 x 1.1-cm complex left renal cyst and diverticulosis, but no acute inflammation or obstruction was seen.  He recently started a course of Augmentin, and also took some laxatives (prune juice, Docusate), has begun to defecate more, and is improving.  The RLQ pain is intermittent, worse with lying on his left side, not impacted by meals or defecation.  He denied fever, nausea, or chills.  He has had nocturia and weak urinary stream, to see Urology shortly. He has 5 CAD stents, maintained on baby aspirin, and history of paroxysmal atrial fibrillation, maintained on Eliquis. He has history of colonic polyps, with adenomas, hyperplastic polyp, and left-sided diverticulosis at last colonoscopy June 2019.  His older sister was diagnosed with colon cancer at age 93.

## 2023-05-30 NOTE — REVIEW OF SYSTEMS
[Loss Of Hearing] : hearing loss [Abdominal Pain] : abdominal pain [Urinary Difficulties] : urinary difficulties [Joint Stiffness] : joint stiffness [Negative] : Heme/Lymph [As Noted in HPI] : as noted in HPI [Constipation] : constipation [Bloating (gassiness)] : bloating [FreeTextEntry7] : recent amoxicillin for suspected diverticulitis

## 2023-05-30 NOTE — ASSESSMENT
[FreeTextEntry1] : 1.  Recent RLQ pain, constipation, bloating may have been from low-grade diverticulitis, nephrolithiasis, change in diet (decreased fluid intake) with harder stools.\par 2.  History of colonic polyps (adenomas, hyperplastic), with two adenomas, hyperplastic polyp, left-sided diverticulosis at last colonoscopy June 2019.  Stool guaiac negative with negative fecal immunochemical test on today's exam.\par 3.  Fatty liver, gallbladder sludge.\par 4.  Status post 5 CAD stents, maintained on aspirin.\par 5.  Paroxysmal atrial fibrillation, maintained on Eliquis.\par 6.  Prediabetes.\par 7.  DJD of spine.\par 8.  History of anxiety/depression.\par 9.  BPH/nocturia, history of microscopic hematuria; complex left renal cyst on CT scan May 2023.\par 10.  Ex-smoker.\par 11.  Glaucoma.\par 12.  Pulmonary nodules.\par 13.  DJD of spine.\par 14.  Seasonal allergies; allergic to Pravachol, Zoloft.\par \par Plan:\par 1.  Medical records reviewed.\par 2.  Complete course of Augmentin, as prescribed.\par 3.  Continue efforts to optimize bowel regimen.\par 4.  Follow-up with Urology regarding BPH, microscopic hematuria, abnormal CT scan.\par 5.  Unclear whether the potential benefits of repeat colonoscopy outweigh the risks at this time.  He would require cardiac clearance for same.  Would hold off for at least 4-6 weeks, given the possibility of recent diverticulitis.  He will return here in 3-4 months for further discussion.

## 2023-05-30 NOTE — PHYSICAL EXAM
[No Acute Distress] : no acute distress [Well Developed] : well developed [Well Nourished] : well nourished [Heart Rate And Rhythm] : heart rate was normal and rhythm regular [Murmurs] : no murmurs [None] : no edema [Bowel Sounds] : normal bowel sounds [Abdomen Tenderness] : non-tender [Abdomen Soft] : soft [] : no hepatosplenomegaly [No External Hemorrhoid] : no external hemorrhoids [+3] : prostate: +3 [Inguinal Lymph Nodes Enlarged Bilaterally] : no inguinal lymphadenopathy [Normal Color / Pigmentation] : normal skin color and pigmentation [Normal] : oriented to person, place, and time [Occult Blood] : negative occult blood [FIT Test] : negative FIT test [FreeTextEntry1] : wears glasses [de-identified] : hearing aids [de-identified] : tiny reducible umbilical hernia

## 2023-05-30 NOTE — CONSULT LETTER
[Dear  ___] : Dear  [unfilled], [Consult Letter:] : I had the pleasure of evaluating your patient, [unfilled]. [Please see my note below.] : Please see my note below. [Consult Closing:] : Thank you very much for allowing me to participate in the care of this patient.  If you have any questions, please do not hesitate to contact me. [Sincerely,] : Sincerely, [FreeTextEntry3] : Alen Angulo M.D.\par

## 2023-05-31 ENCOUNTER — OUTPATIENT (OUTPATIENT)
Dept: OUTPATIENT SERVICES | Facility: HOSPITAL | Age: 78
LOS: 1 days | End: 2023-05-31
Payer: MEDICARE

## 2023-05-31 ENCOUNTER — APPOINTMENT (OUTPATIENT)
Dept: ULTRASOUND IMAGING | Facility: CLINIC | Age: 78
End: 2023-05-31
Payer: MEDICARE

## 2023-05-31 DIAGNOSIS — K46.9 UNSPECIFIED ABDOMINAL HERNIA WITHOUT OBSTRUCTION OR GANGRENE: Chronic | ICD-10-CM

## 2023-05-31 DIAGNOSIS — N28.9 DISORDER OF KIDNEY AND URETER, UNSPECIFIED: ICD-10-CM

## 2023-05-31 LAB
APPEARANCE: CLEAR
BACTERIA UR CULT: NORMAL
BACTERIA: NEGATIVE /HPF
BILIRUBIN URINE: NEGATIVE
BLOOD URINE: ABNORMAL
CAST: 0 /LPF
COLOR: NORMAL
EPITHELIAL CELLS: 0 /HPF
GLUCOSE QUALITATIVE U: NEGATIVE MG/DL
KETONES URINE: ABNORMAL MG/DL
LEUKOCYTE ESTERASE URINE: NEGATIVE
MICROSCOPIC-UA: NORMAL
NITRITE URINE: NEGATIVE
PH URINE: 6
PROTEIN URINE: NORMAL MG/DL
RED BLOOD CELLS URINE: 19 /HPF
SPECIFIC GRAVITY URINE: 1.02
UROBILINOGEN URINE: 1 MG/DL
WHITE BLOOD CELLS URINE: 1 /HPF

## 2023-05-31 PROCEDURE — 76775 US EXAM ABDO BACK WALL LIM: CPT | Mod: 26

## 2023-05-31 PROCEDURE — 76775 US EXAM ABDO BACK WALL LIM: CPT

## 2023-06-02 ENCOUNTER — APPOINTMENT (OUTPATIENT)
Dept: INTERNAL MEDICINE | Facility: CLINIC | Age: 78
End: 2023-06-02
Payer: MEDICARE

## 2023-06-02 DIAGNOSIS — N28.9 DISORDER OF KIDNEY AND URETER, UNSPECIFIED: ICD-10-CM

## 2023-06-02 DIAGNOSIS — R31.29 OTHER MICROSCOPIC HEMATURIA: ICD-10-CM

## 2023-06-02 PROCEDURE — 99442: CPT

## 2023-06-02 NOTE — HISTORY OF PRESENT ILLNESS
[Home] : at home, [unfilled] , at the time of the visit. [Medical Office: ([facility])___] : at the medical office located in  [Verbal consent obtained from patient] : the patient, [unfilled] [FreeTextEntry1] : \par f/u [de-identified] : \par As this is a telemedicine visit, no physical exam could be performed.  Diagnosis and treatment is based on symptoms provided.\par \par \par 79 yo M pmhx HTN, HLD, CAD s/p stents, preDM, anxiety/depression, FL, glaucoma, BPH, lung nodules for f/u\par \par Last seen 5/26/23 in office for f/u constipation and abdominal pain.\par -labs done\par -given empiric Augmentin for possible diverticulitis\par -CT scan ordered\par -referral to GI given\par Feeling well.\par \par Reports resolved abdominal pain since last visit.\par Taking Augmentin w/o issues, on last day today\par \par hx constipation- feels is resolved mainly with having prune juice daily.  Last BM today- soft,  no BRBPR or melena.\par -hx using Dulcolax prn (no recent)  \par   \par Denies fever, chills, n/v, dizziness, abdominal pain, CP or sob.\par \par Seen by GI 5/30/23, noted improving on Augmentin- advised to complete course.  Planned to f/u 8/23.\par \par afib, CAD hx stents (last 10/20), HTN, HLD- \par -followed by cardio (Dr. Alcocer, [facility]) - has f/u 6/23.  \par -on Crestor, tolerating w/o SEs\par -on ASA and metoprolol 25 qd\par \par Reports no new  issues, still gets frequency on/off as prior.\par  appt pending 6/8/23.\par \par anxiety/depression-reports stable, denies HI/SI or panic attacks\par -on lorazepam prn, denies SEs\par \par hx cervical radiculopathy- has had 2 injections so far by PMR, #3 pending (no date set yet) as advised at f/u 5/23.  Notes improved sx's with injections, but still on/off sx's\par -on Tylenol prn, not needed regularly \par

## 2023-06-02 NOTE — ASSESSMENT
[FreeTextEntry1] : \par 77 yo M pmhx HTN, HLD, afib (on ELiquis), CAD s/p stents, preDM, anxiety/depression, FL, diverticulosis, glaucoma, BPH, lung nodules for f/u\par \par hx gen abdominal pain, constipation- s/p ER 5/12/22, no acute findings (+diverticulosis), elevated wbc on labs.  Improved some, but now c/o different right abdominal pain that persisted.  Now resolved on Augmentin course.\par -5/23 cbc/cmp wnl\par -5/23 UA +rbc, trace prt, Ucx unremarkable\par -on empiric Augmentin, advised to complete course as given\par -CT A/P r/o diverticulitis pending.  Will task PA team re: status, pt willing to pursue if PA obtained.\par -seen by GI 5/30/23, noted improving on Augmentin- advised to complete course.  Planned to f/u 8/23.\par -advised prompt ER evaluation if symptoms worsen or new symptoms arise\par \par left renal lesion- noted incidentally on CT scan in ER 5/22\par -5/23 US renal results pending\par - f/u pending 6/8/23, advised to discuss findings\par \par hx microscopic hematuria, BPH- stable per pt\par -off myrbetriq 2/2 constipation\par -self d/c'd oxybutynin since  visit as did not like taking it- declines medication use at this time\par -3/23 bmp/PSA, UA +rbc, Ucx unremarkable \par -5/23 UA +rbc, trace prt, Ucx unremarkable\par -followed by , seen 3/22 with Trospium added (use pending) to Flomax BID.  F/U pending 6/8/23.\par \par afib, CAD hx stents (last 10/20), HTN, HLD- 3/23 Tchol 139  HDL 43 LDL 74; 3/23 LFTs wnl.  Asx.\par -hx self d/c'd Lovaza 1/14 due to cost (no hx adverse rxn)\par -hx cardiac cath 10/20- noted high grade mid LAD lesion with stent placed, noted patent RCA and OM stents. \par -followed by cardio (Dr. Alcocer, [facility]) - has f/u 6/23.  Asked to forward records.  \par -off pravastatin 2/2 diarrhea with use\par -on Crestor, tolerating w/o SEs\par -off Plavix\par -off ramipril per cardio (with BB started for afib per pt)\par -on ASA and metoprolol 25 qd\par -3/23 cmp/TSH, screening UA no prt\par -5/23 cbc/cmp wnl\par -advised of prompt medical eval need if sx onset (ie dizziness, CP, palpitations, sob, etc)\par \par preDM- 3/23 A1c 5.8 (was 5.8)\par -ADA diet and exercise counseled\par \par anxiety/depression-reports stable, hx mild on PHQ-9, denies acute sx's\par -on lorazepam prn, iSTOP checked and refilled prior\par -declines BH referral\par -advised to f/u if sx's worsen\par \par \par HCM\par -hx CPE 3/23\par -1/22 hep C screening negative\par -hx flu shot 9/22\par \par Pt's cell: [phone]\par \par Pt advised to f/u in 2mo for cont'd medical care, sooner as needed.\par \par

## 2023-06-03 ENCOUNTER — NON-APPOINTMENT (OUTPATIENT)
Age: 78
End: 2023-06-03

## 2023-06-05 ENCOUNTER — RESULT REVIEW (OUTPATIENT)
Age: 78
End: 2023-06-05

## 2023-06-08 ENCOUNTER — APPOINTMENT (OUTPATIENT)
Dept: UROLOGY | Facility: CLINIC | Age: 78
End: 2023-06-08
Payer: MEDICARE

## 2023-06-08 ENCOUNTER — APPOINTMENT (OUTPATIENT)
Dept: CT IMAGING | Facility: IMAGING CENTER | Age: 78
End: 2023-06-08
Payer: MEDICARE

## 2023-06-08 ENCOUNTER — OUTPATIENT (OUTPATIENT)
Dept: OUTPATIENT SERVICES | Facility: HOSPITAL | Age: 78
LOS: 1 days | End: 2023-06-08
Payer: MEDICARE

## 2023-06-08 DIAGNOSIS — K57.90 DIVERTICULOSIS OF INTESTINE, PART UNSPECIFIED, WITHOUT PERFORATION OR ABSCESS WITHOUT BLEEDING: ICD-10-CM

## 2023-06-08 DIAGNOSIS — R10.9 UNSPECIFIED ABDOMINAL PAIN: ICD-10-CM

## 2023-06-08 DIAGNOSIS — K59.00 CONSTIPATION, UNSPECIFIED: ICD-10-CM

## 2023-06-08 DIAGNOSIS — K46.9 UNSPECIFIED ABDOMINAL HERNIA WITHOUT OBSTRUCTION OR GANGRENE: Chronic | ICD-10-CM

## 2023-06-08 PROCEDURE — 74176 CT ABD & PELVIS W/O CONTRAST: CPT | Mod: 26

## 2023-06-08 PROCEDURE — 74176 CT ABD & PELVIS W/O CONTRAST: CPT

## 2023-06-08 PROCEDURE — 99214 OFFICE O/P EST MOD 30 MIN: CPT

## 2023-06-09 ENCOUNTER — NON-APPOINTMENT (OUTPATIENT)
Age: 78
End: 2023-06-09

## 2023-06-11 NOTE — LETTER BODY
[FreeTextEntry1] : \par Martha Ulloa MD\par [address], \[place] [address]\par [phone]\par \par Dear Dr. Ulloa,\par \par \par Reason for visit: BPH.    Urinary frequency.\par \par This is a 78 year year-old gentleman with urinary frequency and  chronic BPH. Patient returns today for followup. Patient continues to take Flomax as well as trospium. Patient reports taking the  medicationsregularly without any side effects or difficulties with the medication. Patient reports improvement in his urinary flow. Patient denies any urinary retention or hematuria or changes in health. Patient has no pain. The past medical history and family history and social history are unchanged. All other review of systems are negative. Patient denies any changes in medications. Medication list was reconciled.\par \par On examination, the patient is a  well appearing gentleman in no acute distress. He is alert and oriented follows commands. He has normal mood and affect. He is normocephalic. Oral no thrush. Neck is supple. Respirations are unlabored. His abdomen is soft and nontender. Liver is nonpalpable. Bladder is nonpalpable. No CVA tenderness. Neurologically he is grossly intact. No peripheral edema. Skin without gross abnormality.  On rectal examination, there is normal sphincter tone. The prostate is clinically benign without focal induration or nodularity.\par \par Assessment: BPH, symptoms improved with Flomax. urinary frequency, improved with trospium.\par \par I counseled the patient.   In terms of his BPH, his urinary symptoms are stable. I renewed his prescription for Flomax today.    In terms of urinary frequency, his symptoms are improved with trospium.  I renewed his prescriptions today.Patient obtain PSA today.  I encouraged him to continue medication. Risks and alternatives were discussed. I answered the patient questions. The patient will follow-up as directed and will contact me with any questions or concerns.  Thank you for the opportunity to participate in the care of this patient. I'll keep you updated on his  progress.\par \par Plan: Followup 1 year. Continue Flomax.   Continue trospium. PSA.\par

## 2023-06-13 ENCOUNTER — APPOINTMENT (OUTPATIENT)
Dept: PHYSICAL MEDICINE AND REHAB | Facility: CLINIC | Age: 78
End: 2023-06-13
Payer: MEDICARE

## 2023-06-13 ENCOUNTER — OUTPATIENT (OUTPATIENT)
Dept: OUTPATIENT SERVICES | Facility: HOSPITAL | Age: 78
LOS: 1 days | End: 2023-06-13
Payer: MEDICARE

## 2023-06-13 DIAGNOSIS — M47.812 SPONDYLOSIS W/OUT MYELOPATHY OR RADICULOPATHY, CERVICAL REGION: ICD-10-CM

## 2023-06-13 DIAGNOSIS — M47.812 SPONDYLOSIS WITHOUT MYELOPATHY OR RADICULOPATHY, CERVICAL REGION: ICD-10-CM

## 2023-06-13 DIAGNOSIS — K46.9 UNSPECIFIED ABDOMINAL HERNIA WITHOUT OBSTRUCTION OR GANGRENE: Chronic | ICD-10-CM

## 2023-06-13 PROCEDURE — 64633 DESTROY CERV/THOR FACET JNT: CPT

## 2023-06-13 PROCEDURE — 64634 DESTROY C/TH FACET JNT ADDL: CPT

## 2023-06-13 NOTE — PROCEDURE
[de-identified] : CERVICAL MEDIAL BRANCH RADIOFREQUENCY ABLATION WITH FLUOROSCOPIC GUIDANCE\par \par Eliquis held for at least 3 days. ASA held for 7 days.\par \par Levels treated: RIGHT C5-C6 and C6-C7\par \par Complications: None\par \par Estimated Blood Loss: None\par \par Technique: After informed consent was obtained, patient was taken to the operating room and placed on the table in the prone position. All pressure points were supported. The lumbar area was then exposed and prepped with betadine x3 followed by chlorhexidine and draped in a standard sterile manner. The entirety of the procedure was done under sterile technique using sterile gloves, surgical mask and cap and all medication expiration dates were verified prior to being drawn into syringes.\par \par Utilizing AP fluoroscopy the C4-C5 facet joint was identified. Utilizing a 25G 1-1/2 inch needle, 1ml of preservative free 1% lidocaine was injected for a skin wheal and anesthetized tract. Following this, a 20G radiofrequency spinal needle with 5 mm active tip was inserted until bony contact was made at the junction of articular process on the [] side. This was repeated at the C3-C4, and C5-C6 levels on the [] side where needles were placed a the junction of the transverse process and superior articular process. \par \par Lateral fluoroscopy was then used to ensure the needle locations were not anterior/interforaminal. Motor testing was then conducted at 4 volts and 2 Hz and negative for lower extremity fasciculations. Prior to lesioning, 1mL of 0.25% Marcaine was injected through each needle after negative aspiration for heme and CSF. Lesioning was then carried out for 90 seconds at 80 degrees Celsius. Following lesioning, 1 mL of 10mg/mL Dexamethasone and 0.25% Marcaine was injected at each level without complication. \par \par Advised to restart blood thinners on 6/14/23\par \par Patient tolerated the procedure well and was taken the phase II recovery in stable condition with bilateral lower extremity motor and sensory intact.

## 2023-06-19 ENCOUNTER — NON-APPOINTMENT (OUTPATIENT)
Age: 78
End: 2023-06-19

## 2023-07-05 ENCOUNTER — RX CHANGE (OUTPATIENT)
Age: 78
End: 2023-07-05

## 2023-07-24 ENCOUNTER — APPOINTMENT (OUTPATIENT)
Dept: PHYSICAL MEDICINE AND REHAB | Facility: CLINIC | Age: 78
End: 2023-07-24
Payer: MEDICARE

## 2023-07-24 VITALS — DIASTOLIC BLOOD PRESSURE: 67 MMHG | OXYGEN SATURATION: 97 % | SYSTOLIC BLOOD PRESSURE: 134 MMHG | HEART RATE: 77 BPM

## 2023-07-24 DIAGNOSIS — M47.812 SPONDYLOSIS W/OUT MYELOPATHY OR RADICULOPATHY, CERVICAL REGION: ICD-10-CM

## 2023-07-24 DIAGNOSIS — M25.511 PAIN IN RIGHT SHOULDER: ICD-10-CM

## 2023-07-24 DIAGNOSIS — G89.29 PAIN IN RIGHT SHOULDER: ICD-10-CM

## 2023-07-24 PROCEDURE — 99214 OFFICE O/P EST MOD 30 MIN: CPT

## 2023-07-24 NOTE — HISTORY OF PRESENT ILLNESS
[FreeTextEntry1] : DUC SOTELO had RIGHT C56, C67 MEDIAL BRANCH RADIOFREQUENCY ABLATION WITH FLUOROSCOPIC GUIDANCE on 6/13/23. Today patient is here for follow up. Patient reports greater than 80% reduction in pain as tested by the NRS pain scale over the neck.\par \par However he reports today that he is still feeling tingling down the right arm and is also having right shoulder pains. He thought these would go away with the RFA.\par \par Denies any new pain, numbness or weakness, bowel/bladder dysfunction, saddle anesthesia, fevers, chills, weight loss, night pain, or night sweats at this time.\par

## 2023-07-24 NOTE — ASSESSMENT
[FreeTextEntry1] : Mr. DUC SOTELO is a 78 year M with pain in the neck on the right. He reports chronic long standing pain due to cervical spondylosis without myelopathy. He responded extremely well to the MBBs x2 and had RFA. He is still reporting shooting pains and tingling down the right arm due to radiculopathy.\par \par He also has right shoulder pain due to DJD.\par \par Patient reassured and educated on the diagnosis and treatment options. Risks and benefits of treatment and of delaying treatment discussed with patient. Risks discussed include but not limited to: progression of symptoms, worsening pain and functional status, etc.\par \par DUC SOTELO is taking blood thinners: Eliquis and ASA at this time. Risks and benefits of having injection while on blood thinners explained to the patient. Risks discussed included, but not limited to: pain, infection, bleeding requiring emergency surgery, headaches, damage to vital organs. \par \par DUC SOTELO will need to hold blood thinner for Eliquis for 3 days and ASA for 7 days prior to coming in for the procedure.\par \par Patient had tried and failed conservative treatment. This includes but is not limited to: Acetaminophen, NSAIDs, muscle relaxants, neuropathic medications, physician directed home exercises and/or physical therapy for at least 8 weeks. After a thorough discussion of risks and benefits patient would like to proceed with CERVICAL EPIDURAL STEROID INJECTION AT C7-T1 WITH FLUOROSCOPIC GUIDANCE \par \par Risks and benefits of having injection discussed with patient. Risks discussed included, but not limited to: pain, infection, bleeding requiring emergency surgery, headaches, damage to vital organs, etc.\par \par At this time, patient appears to be psychologically stable. Based on the history, physical exam and diagnostic findings, patient will benefit from this procedure. The goal of this procedure is to reduce pain and inflammation, improve function and range of motion and to further promote increased activity and return to previous level of functioning. The ultimate goal of performing this procedure is to improve patient's quality of life.\par \par Asking for authorization for CERVICAL EPIDURAL STEROID INJECTION AT C7-T1 WITH FLUOROSCOPIC GUIDANCE  to help treat patient´s pain.  Patient will be scheduled for this procedure.\par \par Referring to sports medicine for right shoulder pain\par Consider restarting Gabapentin for radiculopathy\par Discussed referring to surgery in the future\par \par I have personally spent a total of at least 35 minutes preparing, reviewing internal and external records, explaining, counseling, and coordinating care for this patient encounter.\par \par Patient was advised if the following symptoms develop: chills, fever, loss of bladder control, bowel incontinence or urinary retention, numbness/tingling or weakness is present in upper or lower extremities, to go to the nearest emergency room. This may be a new clinical condition not present at the time of the patient visit that may lead to paralysis and/or death. Patient advised if the above symptoms developed to also call the office immediately to inform us and to go to the nearest emergency room.\par \par This note was generated using Dragon medical dictation software. A reasonable effort had been made for proofreading its contents, but spelling mistakes or grammatical errors may still remain. If there are any questions or points of clarification needed please notify my office.

## 2023-07-27 ENCOUNTER — APPOINTMENT (OUTPATIENT)
Dept: INTERNAL MEDICINE | Facility: CLINIC | Age: 78
End: 2023-07-27
Payer: MEDICARE

## 2023-07-27 DIAGNOSIS — Z87.19 PERSONAL HISTORY OF OTHER DISEASES OF THE DIGESTIVE SYSTEM: ICD-10-CM

## 2023-07-27 PROCEDURE — 99442: CPT

## 2023-07-27 RX ORDER — AMOXICILLIN AND CLAVULANATE POTASSIUM 875; 125 MG/1; MG/1
875-125 TABLET, COATED ORAL
Qty: 14 | Refills: 0 | Status: DISCONTINUED | COMMUNITY
Start: 2023-05-26 | End: 2023-07-27

## 2023-07-27 NOTE — HISTORY OF PRESENT ILLNESS
[Home] : at home, [unfilled] , at the time of the visit. [Medical Office: ([facility])___] : at the medical office located in  [Verbal consent obtained from patient] : the patient, [unfilled] [FreeTextEntry1] : \par f/u [de-identified] : \par As this is a telemedicine visit, no physical exam could be performed.  Diagnosis and treatment is based on symptoms provided.\par \par \par 77 yo M pmhx HTN, HLD, CAD s/p stents, preDM, anxiety/depression, FL, glaucoma, BPH, lung nodules for f/u\par \par Feeling well.\par Needs med refill.\par \par anxiety/depression-reports stable, denies HI/SI or panic attacks\par -on lorazepam prn, denies SEs\par \par Reports abdominal pain remains resolved since last visit.\par -is s/p Augmentin course w/o issues\par \par hx constipation- feels is resolved mainly with having prune juice daily.  Is having daily soft BM,  no BRBPR or melena.\par   \par Denies fever, chills, n/v, dizziness, abdominal pain, CP or sob.\par \par Seen by GI 5/30/23, noted improving on Augmentin- advised to complete course.  Planned to f/u 8/23.\par \par afib, CAD hx stents (last 10/20), HTN, HLD- \par -followed by cardio (Dr. Alcocer, [facility]) - had f/u 6/23 with echo done- told normal.  No med changes made.  Labs ordered to be done prior to next visit in 6mo.  \par -on Crestor, tolerating w/o SEs\par -on ASA and metoprolol 25 qd\par \par Reports no new  issues, still gets frequency on/off as prior but feels meds are helping\par -seen by  6/23, cont'd on med regimen\par \par hx cervical radiculopathy- has had 3 injections so far by PMR, last 7/24/23.  Notes minimally improved sx's with injections, is considering epidural injection (no date set).  States declined gabapentin.  Is considering ortho f/u if epidural does not help.\par -on Tylenol prn, not needed regularly \par

## 2023-07-27 NOTE — ASSESSMENT
[FreeTextEntry1] : \par 77 yo M pmhx HTN, HLD, afib (on ELiquis), CAD s/p stents, preDM, anxiety/depression, FL, diverticulosis, glaucoma, BPH, lung nodules for f/u\par \par hx gen abdominal pain, constipation- resolved s/p Augmentin course and diet changes\par s/p ER 5/23, no acute findings (+diverticulosis), elevated wbc on labs. \par -5/23 cbc/cmp wnl\par -5/23 UA +rbc, trace prt, Ucx unremarkable\par -5/23 US renal: Subcentimeter nonobstructing left renal calculus.  Bilateral simple appearing renal cysts.\par There is 2.0 x 1.4 cm complex cystic lesion in the right hepatic lobe along the capsule which is new as compared with the prior studies. It is of indeterminate significance. Consider further evaluation with contrast enhanced abdominal MRI.\par -6/23 CT A/P: No acute pathology. Liver wnl.\par -seen by GI 5/30/23, noted improving on Augmentin- advised to complete course.  Planned to f/u 8/23.\par -advised prompt ER evaluation if symptoms worsen or new symptoms arise\par \par left renal lesion- noted incidentally on CT scan in ER 5/23\par -5/23 US renal: Subcentimeter nonobstructing left renal calculus.  Bilateral simple appearing renal cysts.\par There is 2.0 x 1.4 cm complex cystic lesion in the right hepatic lobe along the capsule which is new as compared with the prior studies. It is of indeterminate significance. Consider further evaluation with contrast enhanced abdominal MRI.\par -pt declines MRI to further evaluate liver findings on US, wishes to d/w GI for further management\par - f/u pending 6/8/23, advised to discuss findings\par \par hx microscopic hematuria, BPH- \par -off myrbetriq 2/2 constipation\par -self d/c'd oxybutynin since  visit as did not like taking it- declines medication use at this time\par -3/23 bmp/PSA, UA +rbc, Ucx unremarkable \par -5/23 UA +rbc, trace prt, Ucx unremarkable\par -followed by , seen 6/23 with med regimen cont'd.  Advised pt to f/u with  re: renal US findings.\par \par afib, CAD hx stents (last 10/20), HTN, HLD- 3/23 Tchol 139  HDL 43 LDL 74; 3/23 LFTs wnl.  Asx.\par -hx self d/c'd Lovaza 1/14 due to cost (no hx adverse rxn)\par -hx cardiac cath 10/20- noted high grade mid LAD lesion with stent placed, noted patent RCA and OM stents. \par -followed by cardio (Dr. Alcocer, [facility]) - had f/u 6/23 with echo done- told normal.  No med changes made.  Labs ordered to be done prior to next visit in 6mo.  Asked to forward records.  \par -off pravastatin 2/2 diarrhea with use\par -on Crestor, tolerating w/o SEs\par -off Plavix\par -off ramipril per cardio (with BB started for afib per pt)\par -on ASA and metoprolol 25 qd\par -followed by optho, has f/u 8/23\par -3/23 cmp/TSH, screening UA no prt\par -5/23 cbc/cmp wnl\par -advised of prompt medical eval need if sx onset (ie dizziness, CP, palpitations, sob, etc)\par -check bmp\par \par preDM- 3/23 A1c 5.8 (was 5.8)\par -ADA diet and exercise counseled\par -check A1c\par \par anxiety/depression-reports stable, hx mild on PHQ-9, denies acute sx's\par -on lorazepam prn, iSTOP checked and refilled\par -declines BH referral\par -advised to f/u if sx's worsen\par \par hx cervical radiculopathy- s/p 3 injections by PMR, last 7/24/23.  Notes minimally improved sx's with injections\par -is considering epidural injection (no date set).  \par -declined gabapentin.  \par -is considering ortho f/u if epidural does not help.\par -on Tylenol prn, not needed regularly \par \par MISC: lab orders placed, pt will have done at satellite lab\par \par \par HCM\par -hx CPE 3/23\par -1/22 hep C screening negative\par -hx flu shot 9/22\par \par Pt's cell: [phone]\par \par Pt advised to f/u in 2mo for cont'd medical care, sooner as needed.\par \par

## 2023-08-01 LAB
ANION GAP SERPL CALC-SCNC: 8 MMOL/L
BUN SERPL-MCNC: 15 MG/DL
CALCIUM SERPL-MCNC: 9.2 MG/DL
CHLORIDE SERPL-SCNC: 104 MMOL/L
CO2 SERPL-SCNC: 27 MMOL/L
CREAT SERPL-MCNC: 0.95 MG/DL
EGFR: 81 ML/MIN/1.73M2
ESTIMATED AVERAGE GLUCOSE: 114 MG/DL
GLUCOSE SERPL-MCNC: 103 MG/DL
HBA1C MFR BLD HPLC: 5.6 %
POTASSIUM SERPL-SCNC: 4.8 MMOL/L
SODIUM SERPL-SCNC: 139 MMOL/L

## 2023-08-12 ENCOUNTER — OFFICE (OUTPATIENT)
Dept: URBAN - METROPOLITAN AREA CLINIC 90 | Facility: CLINIC | Age: 78
Setting detail: OPHTHALMOLOGY
End: 2023-08-12
Payer: MEDICARE

## 2023-08-12 DIAGNOSIS — H01.004: ICD-10-CM

## 2023-08-12 DIAGNOSIS — H04.123: ICD-10-CM

## 2023-08-12 DIAGNOSIS — H40.1131: ICD-10-CM

## 2023-08-12 DIAGNOSIS — H43.813: ICD-10-CM

## 2023-08-12 DIAGNOSIS — H01.001: ICD-10-CM

## 2023-08-12 DIAGNOSIS — H18.413: ICD-10-CM

## 2023-08-12 DIAGNOSIS — H25.12: ICD-10-CM

## 2023-08-12 PROBLEM — H40.1111 POAG; RIGHT EYE MILD: Status: ACTIVE | Noted: 2023-08-12

## 2023-08-12 PROCEDURE — 92133 CPTRZD OPH DX IMG PST SGM ON: CPT | Performed by: OPHTHALMOLOGY

## 2023-08-12 PROCEDURE — 92014 COMPRE OPH EXAM EST PT 1/>: CPT | Performed by: OPHTHALMOLOGY

## 2023-08-12 ASSESSMENT — AXIALLENGTH_DERIVED
OS_AL: 24.6654
OS_AL: 24.7724
OD_AL: 26.1848
OD_AL: 26.3054
OD_AL: 26.5498

## 2023-08-12 ASSESSMENT — REFRACTION_AUTOREFRACTION
OD_AXIS: 119
OS_SPHERE: -0.75
OS_AXIS: 080
OD_SPHERE: -2.75
OS_CYLINDER: -0.50
OD_CYLINDER: -2.75

## 2023-08-12 ASSESSMENT — REFRACTION_CURRENTRX
OS_AXIS: 045
OD_CYLINDER: -2.75
OD_ADD: +3.50
OD_VPRISM_DIRECTION: PROGS
OS_SPHERE: -0.50
OD_OVR_VA: 20/
OD_VPRISM_DIRECTION: PROGS
OS_CYLINDER: -0.50
OD_OVR_VA: 20/
OS_VPRISM_DIRECTION: PROGS
OS_OVR_VA: 20/
OD_ADD: +3.25
OD_AXIS: 127
OD_SPHERE: -3.25
OS_OVR_VA: 20/
OS_SPHERE: +0.50
OS_ADD: +3.50
OD_SPHERE: -2.75
OS_CYLINDER: -0.50
OS_VPRISM_DIRECTION: PROGS
OS_ADD: +3.25
OD_AXIS: 120
OS_AXIS: 036
OD_CYLINDER: -2.75

## 2023-08-12 ASSESSMENT — TONOMETRY
OD_IOP_MMHG: 15
OS_IOP_MMHG: 15

## 2023-08-12 ASSESSMENT — KERATOMETRY
OD_AXISANGLE_DEGREES: 043
OS_AXISANGLE_DEGREES: 130
OD_K1POWER_DIOPTERS: 40.25
METHOD_AUTO_MANUAL: AUTO
OD_K2POWER_DIOPTERS: 42.25
OS_K1POWER_DIOPTERS: 41.25
OS_K2POWER_DIOPTERS: 41.75

## 2023-08-12 ASSESSMENT — REFRACTION_MANIFEST
OS_AXIS: 140
OS_VA1: 20/30
OD_VA1: 20/25-
OD_CYLINDER: -2.75
OD_SPHERE: -5.25
OD_CYLINDER: +2.75
OD_SPHERE: -3.25
OS_SPHERE: PLANO
OD_AXIS: 30
OS_CYLINDER: -0.50
OD_ADD: +3.50
OD_VA1: 20/30
OS_SPHERE: -0.50
OS_CYLINDER: +0.50
OS_ADD: +3.50
OS_AXIS: 045
OS_VA1: 20/30
OD_AXIS: 120

## 2023-08-12 ASSESSMENT — CONFRONTATIONAL VISUAL FIELD TEST (CVF)
OD_FINDINGS: FULL
OS_FINDINGS: FULL

## 2023-08-12 ASSESSMENT — LID POSITION - LOWER LID LAG
OD_LOWER_LID_LAG: RLL 1+
OS_LOWER_LID_LAG: LLL 1+

## 2023-08-12 ASSESSMENT — LID EXAM ASSESSMENTS
OD_BLEPHARITIS: RUL T 1+
OS_BLEPHARITIS: LUL T 1+

## 2023-08-12 ASSESSMENT — SPHEQUIV_DERIVED
OS_SPHEQUIV: -0.75
OS_SPHEQUIV: -1
OD_SPHEQUIV: -4.625
OD_SPHEQUIV: -3.875
OD_SPHEQUIV: -4.125

## 2023-08-12 ASSESSMENT — VISUAL ACUITY
OS_BCVA: 20/40+2
OD_BCVA: 20/40+2

## 2023-08-21 ENCOUNTER — APPOINTMENT (OUTPATIENT)
Dept: GASTROENTEROLOGY | Facility: CLINIC | Age: 78
End: 2023-08-21

## 2023-11-21 ENCOUNTER — APPOINTMENT (OUTPATIENT)
Dept: INTERNAL MEDICINE | Facility: CLINIC | Age: 78
End: 2023-11-21
Payer: MEDICARE

## 2023-11-21 VITALS
SYSTOLIC BLOOD PRESSURE: 110 MMHG | WEIGHT: 158 LBS | BODY MASS INDEX: 25.39 KG/M2 | RESPIRATION RATE: 61 BRPM | HEART RATE: 68 BPM | DIASTOLIC BLOOD PRESSURE: 60 MMHG | OXYGEN SATURATION: 97 % | HEIGHT: 66 IN | TEMPERATURE: 97.9 F

## 2023-11-21 DIAGNOSIS — Z23 ENCOUNTER FOR IMMUNIZATION: ICD-10-CM

## 2023-11-21 DIAGNOSIS — I25.10 ATHEROSCLEROTIC HEART DISEASE OF NATIVE CORONARY ARTERY W/OUT ANGINA PECTORIS: ICD-10-CM

## 2023-11-21 DIAGNOSIS — R73.03 PREDIABETES.: ICD-10-CM

## 2023-11-21 DIAGNOSIS — Z87.898 PERSONAL HISTORY OF OTHER SPECIFIED CONDITIONS: ICD-10-CM

## 2023-11-21 DIAGNOSIS — M54.12 RADICULOPATHY, CERVICAL REGION: ICD-10-CM

## 2023-11-21 PROCEDURE — 90662 IIV NO PRSV INCREASED AG IM: CPT

## 2023-11-21 PROCEDURE — 99214 OFFICE O/P EST MOD 30 MIN: CPT | Mod: 25

## 2023-11-21 PROCEDURE — G0008: CPT

## 2023-11-28 ENCOUNTER — APPOINTMENT (OUTPATIENT)
Dept: INTERNAL MEDICINE | Facility: CLINIC | Age: 78
End: 2023-11-28

## 2024-01-22 ENCOUNTER — APPOINTMENT (OUTPATIENT)
Dept: INTERNAL MEDICINE | Facility: CLINIC | Age: 79
End: 2024-01-22
Payer: MEDICARE

## 2024-01-22 DIAGNOSIS — E78.5 HYPERLIPIDEMIA, UNSPECIFIED: ICD-10-CM

## 2024-01-22 DIAGNOSIS — I10 ESSENTIAL (PRIMARY) HYPERTENSION: ICD-10-CM

## 2024-01-22 DIAGNOSIS — I48.91 UNSPECIFIED ATRIAL FIBRILLATION: ICD-10-CM

## 2024-01-22 PROCEDURE — 99442: CPT

## 2024-01-22 RX ORDER — LORAZEPAM 0.5 MG/1
0.5 TABLET ORAL
Qty: 30 | Refills: 0 | Status: ACTIVE | COMMUNITY
Start: 2019-04-25 | End: 1900-01-01

## 2024-01-22 NOTE — DATA REVIEWED
[FreeTextEntry1] : 7/31/23 labs reviewed  Outside labs: 11/8/23 CBC WNL CMP WNL Tchol 133  LDL 72 HDL 41

## 2024-01-22 NOTE — ASSESSMENT
[FreeTextEntry1] : __________________________________________________________________________ 79 yo M pmhx HTN, HLD, afib (on Eliquis), CAD s/p stents, preDM, anxiety/depression, FL, diverticulosis, glaucoma, BPH, lung nodules for f/u  anxiety/depression-reports stable, hx mild on PHQ-9, denies acute sx's -on lorazepam prn, iSTOP checked and refilled.  Aware of need for office visit for cont'd refills. -declines  referral -advised to f/u if sx's worsen  afib, CAD hx stents (last 10/20), HTN, HLD- 3/23 Tchol 139  HDL 43 LDL 74; 3/23 LFTs wnl.  -hx self d/c'd Lovaza 1/14 due to cost (no hx adverse rxn) -hx cardiac cath 10/20- noted high grade mid LAD lesion with stent placed, noted patent RCA and OM stents. -followed by cardio (Dr. Alcocer, [facility]) - hx 6/23 echo done- told normal. Had f/u 12/23 with labs done 11/23 reviewed, no med changes made. -Asked to forward records. -off pravastatin 2/2 diarrhea with use -on Crestor, tolerating w/o SEs -off Plavix -off ramipril per cardio (with BB started for afib per pt) -on ASA, Eliquis and metoprolol 25 qd -followed by optho -3/23 cmp/TSH, screening UA no prt -5/23 cbc/cmp wnl -11/23 (by cardio) cbc/cmp wnl -advised of prompt medical eval need if sx onset (ie dizziness, CP, palpitations, sob, etc)  preDM- 7/23 A1c 6 (was 5.8) -ADA diet, exercise and wt loss counseled -defers repeat A1c to CPE visit  hx cervical radiculopathy- s/p 3 injections by PMR, last 7/24/23. Notes minimally improved sx's with injections, reports tolerable sx's -declined gabapentin. -is considering ortho f/u if epidural does not help. -on Tylenol prn, not needed regularly. Is aware to avoid all NSAIDs due to potential increased bleeding risk while on Eliquis.  left renal lesion- noted incidentally on CT scan in ER 5/23 -5/23 US renal: Subcentimeter nonobstructing left renal calculus. Bilateral simple appearing renal cysts. There is 2.0 x 1.4 cm complex cystic lesion in the right hepatic lobe along the capsule which is new as compared with the prior studies. It is of indeterminate significance. Consider further evaluation with contrast enhanced abdominal MRI. -pt declines MRI to further evaluate liver findings on US, wishes to d/w GI for further management - f/u pending, advised to discuss findings  hx microscopic hematuria, BPH-reports improved -off myrbetriq 2/2 constipation -self d/c'd oxybutynin since  visit as did not like taking it- declines medication use at this time -3/23 bmp/PSA, UA +rbc, Ucx unremarkable -5/23 UA +rbc, trace prt, Ucx unremarkable -followed by , seen 6/23 with med regimen cont'd. Advised pt to f/u with  re: renal US findings.  MISC: Continued social distancing and measure for covid19 prevention encouraged. -hx moderna series, last 9/23   HCM -hx CPE 3/23, yearly advised -1/22 hep C screening negative -hx flu shot 11/23  Pt's cell: [phone].  Patient advised that I will be leaving practice as of 3/1/24.  Encouraged to establish care with another colleague in office for continued medical care after my departure- states plans to return to [place] location on [address] for convenience.

## 2024-01-22 NOTE — HISTORY OF PRESENT ILLNESS
[Home] : at home, [unfilled] , at the time of the visit. [Medical Office: ([facility])___] : at the medical office located in  [Verbal consent obtained from patient] : the patient, [unfilled] [FreeTextEntry1] : f/u [de-identified] : As this is a telemedicine visit, no physical exam could be performed.  Diagnosis and treatment is based on symptoms provided.  77 yo M pmhx HTN, HLD, CAD s/p stents, preDM, anxiety/depression, FL, glaucoma, BPH, lung nodules for f/u  Feeling well. Needs med refill.  anxiety/depression-reports stable, denies HI/SI or panic attacks -on lorazepam prn, helps - denies SEs  afib, CAD hx stents (last 10/20), HTN, HLD-  rare palpitations, tolerable -followed by cardio (Dr. Alcocer, [facility] [place]) - hx 6/23 echo done- told normal.  Had f/u 12/23 with labs done 11/23 reviewed, no med changes made.  -followed by optho -on Crestor, tolerating w/o SEs -on ASA, Eliquis and metoprolol 25 qd -denies dizziness, CP or sob -exercise: walks 2-3x/wk x 40 mins - done w/o sx's or limitations  Reports no new  issues, notes frequency improved some on current med regimen per  -seen by  6/23, cont'd on med regimen.  F/u pending  hx cervical radiculopathy- has had 3 injections so far by PMR, last 7/24/23.  Notes minimally improved sx's with injections, but feels is tolerable.  Was considering epidural injection - denied by insurance.  States declined gabapentin.  -on Tylenol prn, not needed regularly

## 2024-01-31 ENCOUNTER — NON-APPOINTMENT (OUTPATIENT)
Age: 79
End: 2024-01-31

## 2024-02-01 LAB
APPEARANCE: ABNORMAL
BACTERIA UR CULT: NORMAL
BACTERIA: NEGATIVE /HPF
BILIRUBIN URINE: NEGATIVE
BLOOD URINE: ABNORMAL
CAST: 0 /LPF
COLOR: ABNORMAL
EPITHELIAL CELLS: 0 /HPF
GLUCOSE QUALITATIVE U: NEGATIVE MG/DL
KETONES URINE: NEGATIVE MG/DL
LEUKOCYTE ESTERASE URINE: ABNORMAL
MICROSCOPIC-UA: NORMAL
NITRITE URINE: NEGATIVE
PH URINE: 7
PROTEIN URINE: 30 MG/DL
RED BLOOD CELLS URINE: 620 /HPF
REVIEW: NORMAL
SPECIFIC GRAVITY URINE: 1.02
UROBILINOGEN URINE: 1 MG/DL
WHITE BLOOD CELLS URINE: 2 /HPF

## 2024-02-05 ENCOUNTER — APPOINTMENT (OUTPATIENT)
Dept: UROLOGY | Facility: CLINIC | Age: 79
End: 2024-02-05
Payer: MEDICARE

## 2024-02-05 ENCOUNTER — OUTPATIENT (OUTPATIENT)
Dept: OUTPATIENT SERVICES | Facility: HOSPITAL | Age: 79
LOS: 1 days | End: 2024-02-05
Payer: MEDICARE

## 2024-02-05 VITALS
HEART RATE: 72 BPM | OXYGEN SATURATION: 99 % | DIASTOLIC BLOOD PRESSURE: 81 MMHG | SYSTOLIC BLOOD PRESSURE: 154 MMHG | RESPIRATION RATE: 16 BRPM

## 2024-02-05 DIAGNOSIS — K46.9 UNSPECIFIED ABDOMINAL HERNIA WITHOUT OBSTRUCTION OR GANGRENE: Chronic | ICD-10-CM

## 2024-02-05 DIAGNOSIS — R35.0 FREQUENCY OF MICTURITION: ICD-10-CM

## 2024-02-05 DIAGNOSIS — R31.0 GROSS HEMATURIA: ICD-10-CM

## 2024-02-05 DIAGNOSIS — N40.1 BENIGN PROSTATIC HYPERPLASIA WITH LOWER URINARY TRACT SYMPMS: ICD-10-CM

## 2024-02-05 DIAGNOSIS — Z79.01 LONG TERM (CURRENT) USE OF ANTICOAGULANTS: ICD-10-CM

## 2024-02-05 PROCEDURE — 76775 US EXAM ABDO BACK WALL LIM: CPT | Mod: 26

## 2024-02-05 PROCEDURE — 99213 OFFICE O/P EST LOW 20 MIN: CPT | Mod: 25

## 2024-02-05 PROCEDURE — 76775 US EXAM ABDO BACK WALL LIM: CPT

## 2024-02-05 PROCEDURE — 52000 CYSTOURETHROSCOPY: CPT

## 2024-02-05 RX ORDER — FINASTERIDE 5 MG/1
5 TABLET, FILM COATED ORAL DAILY
Qty: 90 | Refills: 3 | Status: ACTIVE | COMMUNITY
Start: 2024-02-05 | End: 1900-01-01

## 2024-02-05 RX ORDER — TAMSULOSIN HYDROCHLORIDE 0.4 MG/1
0.4 CAPSULE ORAL
Qty: 180 | Refills: 3 | Status: ACTIVE | COMMUNITY
Start: 2021-07-12 | End: 1900-01-01

## 2024-02-05 NOTE — LETTER BODY
[FreeTextEntry1] : Martha Ulloa MD [address] [address] [phone]  Dear Dr. Ulloa,  Reason for visit: BPH. Urinary frequency.    This is a 78 year year-old gentleman with urinary frequency and chronic BPH. Patient returns today for followup. Patient continues to take Flomax as well as trospium. Patient reports taking the medications regularly without any side effects or difficulties with the medication. Patient reports progressive symptoms despite medical therapy.  Patient denies any urinary retention or hematuria or changes in health. Patient has no pain. The past medical history and family history and social history are unchanged. All other review of systems are negative. Patient denies any changes in medications. Medication list was reconciled.    On examination, the patient is a well appearing gentleman in no acute distress. He is alert and oriented follows commands. He has normal mood and affect. He is normocephalic. Oral no thrush. Neck is supple. Respirations are unlabored. His abdomen is soft and nontender. Liver is nonpalpable. Bladder is nonpalpable. No CVA tenderness. Neurologically he is grossly intact. No peripheral edema. Skin without gross abnormality. On rectal examination, there is normal sphincter tone. The prostate is clinically benign without focal induration or nodularity.    Assessment: BPH, symptoms improved with Flomax. urinary frequency, improved with trospium.    I counseled the patient. In terms of his BPH, his BPH symptoms are progressive despite taking Flomax. I recommended patient add Proscar to the regimen. I discussed the potential side effects of the medication. I counseled the patient on its use and side effects. If the patient develops any side effects, the patient will discontinue the medication and contact me. In terms of urinary frequency, his symptoms are stable trospium. I renewed his prescriptions today for Flomax, Proscar and Trospium today. I encouraged him to continue medication as directed. Risks and alternatives were discussed. I answered the patient questions. The patient will follow-up as directed and will contact me with any questions or concerns. Thank you for the opportunity to participate in the care of this patient. I'll keep you updated on his progress.    Plan: Followup 6 months. Add Proscar. Continue Flomax. Continue trospium.

## 2024-02-05 NOTE — HISTORY OF PRESENT ILLNESS
[FreeTextEntry1] : Follow-up BPH.  Flomax.  Patient has progressive symptoms.  Add Proscar.  Follow-up urinary frequency.  Continue trospium.  Follow-up 6 months.  Please refer to URO Consult note

## 2024-02-05 NOTE — ADDENDUM
[FreeTextEntry1] : Entered by Marco Antonio Tolbert, acting as scribe for Dr. Andrzej Ortiz. The documentation recorded by the scribe accurately reflects the service I personally performed and the decisions made by me.

## 2024-02-06 DIAGNOSIS — N40.1 BENIGN PROSTATIC HYPERPLASIA WITH LOWER URINARY TRACT SYMPTOMS: ICD-10-CM

## 2024-02-07 ENCOUNTER — RESULT REVIEW (OUTPATIENT)
Age: 79
End: 2024-02-07

## 2024-02-07 DIAGNOSIS — F32.A ANXIETY DISORDER, UNSPECIFIED: ICD-10-CM

## 2024-02-07 DIAGNOSIS — F41.9 ANXIETY DISORDER, UNSPECIFIED: ICD-10-CM

## 2024-02-07 LAB — URINE CYTOLOGY: NORMAL

## 2024-02-10 ENCOUNTER — OFFICE (OUTPATIENT)
Dept: URBAN - METROPOLITAN AREA CLINIC 90 | Facility: CLINIC | Age: 79
Setting detail: OPHTHALMOLOGY
End: 2024-02-10
Payer: MEDICARE

## 2024-02-10 DIAGNOSIS — H18.413: ICD-10-CM

## 2024-02-10 DIAGNOSIS — H25.12: ICD-10-CM

## 2024-02-10 DIAGNOSIS — H40.031: ICD-10-CM

## 2024-02-10 DIAGNOSIS — H04.123: ICD-10-CM

## 2024-02-10 DIAGNOSIS — H01.001: ICD-10-CM

## 2024-02-10 DIAGNOSIS — H01.004: ICD-10-CM

## 2024-02-10 DIAGNOSIS — H40.1111: ICD-10-CM

## 2024-02-10 LAB — URINE CYTOLOGY: NORMAL

## 2024-02-10 PROCEDURE — 92012 INTRM OPH EXAM EST PATIENT: CPT | Performed by: OPHTHALMOLOGY

## 2024-02-10 PROCEDURE — 92083 EXTENDED VISUAL FIELD XM: CPT | Performed by: OPHTHALMOLOGY

## 2024-02-10 ASSESSMENT — REFRACTION_CURRENTRX
OD_AXIS: 128
OD_ADD: +3.25
OS_ADD: +3.50
OS_VPRISM_DIRECTION: PROGS
OS_SPHERE: -0.50
OS_CYLINDER: -0.50
OD_VPRISM_DIRECTION: PROGS
OD_SPHERE: -3.25
OS_AXIS: 045
OD_ADD: +3.50
OD_ADD: +2.50
OS_AXIS: 036
OS_ADD: +2.50
OS_OVR_VA: 20/
OD_SPHERE: -3.50
OD_OVR_VA: 20/
OS_AXIS: 045
OS_CYLINDER: -0.50
OD_OVR_VA: 20/
OS_VPRISM_DIRECTION: PROGS
OD_AXIS: 120
OS_OVR_VA: 20/
OS_CYLINDER: -0.50
OS_ADD: +3.25
OD_VPRISM_DIRECTION: PROGS
OD_CYLINDER: -2.75
OD_CYLINDER: -2.50
OD_AXIS: 127
OD_CYLINDER: -2.75
OD_SPHERE: -2.75
OS_OVR_VA: 20/
OD_OVR_VA: 20/
OS_SPHERE: +0.50
OS_SPHERE: -0.50

## 2024-02-10 ASSESSMENT — SPHEQUIV_DERIVED
OD_SPHEQUIV: -3.875
OS_SPHEQUIV: -0.75
OS_SPHEQUIV: -1.375
OD_SPHEQUIV: -4.625
OD_SPHEQUIV: -4.375

## 2024-02-10 ASSESSMENT — CONFRONTATIONAL VISUAL FIELD TEST (CVF)
OD_FINDINGS: FULL
OS_FINDINGS: FULL

## 2024-02-10 ASSESSMENT — REFRACTION_MANIFEST
OD_ADD: +3.50
OD_CYLINDER: -2.75
OS_AXIS: 140
OD_AXIS: 120
OD_VA1: 20/25-
OS_VA1: 20/30
OS_VA1: 20/30
OD_VA1: 20/30
OS_ADD: +3.50
OS_SPHERE: PLANO
OS_SPHERE: -0.50
OD_AXIS: 30
OS_CYLINDER: +0.50
OD_SPHERE: -3.25
OD_CYLINDER: +2.75
OS_CYLINDER: -0.50
OD_SPHERE: -5.25
OS_AXIS: 045

## 2024-02-10 ASSESSMENT — LID POSITION - LOWER LID LAG
OD_LOWER_LID_LAG: RLL 1+
OS_LOWER_LID_LAG: LLL 1+

## 2024-02-10 ASSESSMENT — REFRACTION_AUTOREFRACTION
OD_SPHERE: -3.00
OD_AXIS: 120
OD_CYLINDER: -2.75
OS_AXIS: 087
OS_CYLINDER: -1.25
OS_SPHERE: -0.75

## 2024-02-10 ASSESSMENT — LID EXAM ASSESSMENTS
OS_BLEPHARITIS: LUL T 1+
OD_BLEPHARITIS: RUL T 1+

## 2024-02-16 ENCOUNTER — OUTPATIENT (OUTPATIENT)
Dept: OUTPATIENT SERVICES | Facility: HOSPITAL | Age: 79
LOS: 1 days | End: 2024-02-16
Payer: MEDICARE

## 2024-02-16 ENCOUNTER — APPOINTMENT (OUTPATIENT)
Dept: CT IMAGING | Facility: IMAGING CENTER | Age: 79
End: 2024-02-16
Payer: MEDICARE

## 2024-02-16 DIAGNOSIS — K46.9 UNSPECIFIED ABDOMINAL HERNIA WITHOUT OBSTRUCTION OR GANGRENE: Chronic | ICD-10-CM

## 2024-02-16 DIAGNOSIS — Z00.8 ENCOUNTER FOR OTHER GENERAL EXAMINATION: ICD-10-CM

## 2024-02-16 PROCEDURE — 74178 CT ABD&PLV WO CNTR FLWD CNTR: CPT

## 2024-02-16 PROCEDURE — 74178 CT ABD&PLV WO CNTR FLWD CNTR: CPT | Mod: 26

## 2024-02-23 ENCOUNTER — TRANSCRIPTION ENCOUNTER (OUTPATIENT)
Age: 79
End: 2024-02-23

## 2024-03-04 ENCOUNTER — NON-APPOINTMENT (OUTPATIENT)
Age: 79
End: 2024-03-04

## 2024-03-04 ENCOUNTER — APPOINTMENT (OUTPATIENT)
Dept: UROLOGY | Facility: CLINIC | Age: 79
End: 2024-03-04

## 2024-03-07 ENCOUNTER — TRANSCRIPTION ENCOUNTER (OUTPATIENT)
Age: 79
End: 2024-03-07

## 2024-03-22 ENCOUNTER — NON-APPOINTMENT (OUTPATIENT)
Age: 79
End: 2024-03-22

## 2024-03-23 LAB
APPEARANCE: ABNORMAL
BACTERIA: NEGATIVE /HPF
BILIRUBIN URINE: ABNORMAL
BLOOD URINE: ABNORMAL
CALCIUM OXALATE CRYSTALS: PRESENT
CAST: 0 /LPF
COLOR: ABNORMAL
EPITHELIAL CELLS: 1 /HPF
GLUCOSE QUALITATIVE U: NEGATIVE MG/DL
KETONES URINE: ABNORMAL MG/DL
LEUKOCYTE ESTERASE URINE: ABNORMAL
MICROSCOPIC-UA: NORMAL
NITRITE URINE: NEGATIVE
PH URINE: 5.5
PROTEIN URINE: 100 MG/DL
RED BLOOD CELLS URINE: 70 /HPF
REVIEW: NORMAL
SPECIFIC GRAVITY URINE: 1.02
UROBILINOGEN URINE: 0.2 MG/DL
WHITE BLOOD CELLS URINE: 4 /HPF

## 2024-03-24 LAB — BACTERIA UR CULT: NORMAL

## 2024-03-24 NOTE — REASON FOR VISIT
Age appropriate [Initial Evaluation] : an initial evaluation [Follow-Up: _____] : a [unfilled] follow-up visit [FreeTextEntry1] : colonic polyp

## 2024-06-23 ENCOUNTER — RX RENEWAL (OUTPATIENT)
Age: 79
End: 2024-06-23

## 2024-06-23 RX ORDER — TROSPIUM CHLORIDE 20 MG/1
20 TABLET, FILM COATED ORAL
Qty: 90 | Refills: 3 | Status: ACTIVE | COMMUNITY
Start: 2022-04-27 | End: 1900-01-01

## 2024-07-29 ENCOUNTER — APPOINTMENT (OUTPATIENT)
Dept: INTERNAL MEDICINE | Facility: CLINIC | Age: 79
End: 2024-07-29

## 2024-07-29 ENCOUNTER — OUTPATIENT (OUTPATIENT)
Dept: OUTPATIENT SERVICES | Facility: HOSPITAL | Age: 79
LOS: 1 days | End: 2024-07-29

## 2024-07-29 VITALS
HEART RATE: 69 BPM | DIASTOLIC BLOOD PRESSURE: 78 MMHG | WEIGHT: 158 LBS | HEIGHT: 66 IN | BODY MASS INDEX: 25.39 KG/M2 | OXYGEN SATURATION: 99 % | SYSTOLIC BLOOD PRESSURE: 152 MMHG

## 2024-07-29 VITALS — DIASTOLIC BLOOD PRESSURE: 80 MMHG | SYSTOLIC BLOOD PRESSURE: 162 MMHG

## 2024-07-29 VITALS — SYSTOLIC BLOOD PRESSURE: 138 MMHG | DIASTOLIC BLOOD PRESSURE: 86 MMHG

## 2024-07-29 DIAGNOSIS — Z00.00 ENCOUNTER FOR GENERAL ADULT MEDICAL EXAMINATION W/OUT ABNORMAL FINDINGS: ICD-10-CM

## 2024-07-29 DIAGNOSIS — F41.9 ANXIETY DISORDER, UNSPECIFIED: ICD-10-CM

## 2024-07-29 DIAGNOSIS — M54.12 RADICULOPATHY, CERVICAL REGION: ICD-10-CM

## 2024-07-29 DIAGNOSIS — I10 ESSENTIAL (PRIMARY) HYPERTENSION: ICD-10-CM

## 2024-07-29 DIAGNOSIS — Z86.010 PERSONAL HISTORY OF COLONIC POLYPS: ICD-10-CM

## 2024-07-29 DIAGNOSIS — I48.91 UNSPECIFIED ATRIAL FIBRILLATION: ICD-10-CM

## 2024-07-29 DIAGNOSIS — L60.2 ONYCHOGRYPHOSIS: ICD-10-CM

## 2024-07-29 DIAGNOSIS — H91.90 UNSPECIFIED HEARING LOSS, UNSPECIFIED EAR: ICD-10-CM

## 2024-07-29 DIAGNOSIS — K46.9 UNSPECIFIED ABDOMINAL HERNIA WITHOUT OBSTRUCTION OR GANGRENE: Chronic | ICD-10-CM

## 2024-07-29 DIAGNOSIS — I25.10 ATHEROSCLEROTIC HEART DISEASE OF NATIVE CORONARY ARTERY W/OUT ANGINA PECTORIS: ICD-10-CM

## 2024-07-29 DIAGNOSIS — F32.A ANXIETY DISORDER, UNSPECIFIED: ICD-10-CM

## 2024-07-29 DIAGNOSIS — R91.1 SOLITARY PULMONARY NODULE: ICD-10-CM

## 2024-07-29 PROCEDURE — G0439: CPT

## 2024-07-30 DIAGNOSIS — F32.9 MAJOR DEPRESSIVE DISORDER, SINGLE EPISODE, UNSPECIFIED: ICD-10-CM

## 2024-07-30 LAB
25(OH)D3 SERPL-MCNC: 35.7 NG/ML
ALBUMIN SERPL ELPH-MCNC: 4 G/DL
ALP BLD-CCNC: 61 U/L
ALT SERPL-CCNC: 15 U/L
ANION GAP SERPL CALC-SCNC: 9 MMOL/L
AST SERPL-CCNC: 21 U/L
BASOPHILS # BLD AUTO: 0.04 K/UL
BASOPHILS NFR BLD AUTO: 0.8 %
BILIRUB SERPL-MCNC: 0.5 MG/DL
BUN SERPL-MCNC: 12 MG/DL
CALCIUM SERPL-MCNC: 9.1 MG/DL
CHLORIDE SERPL-SCNC: 104 MMOL/L
CHOLEST SERPL-MCNC: 124 MG/DL
CO2 SERPL-SCNC: 25 MMOL/L
CREAT SERPL-MCNC: 0.95 MG/DL
EGFR: 81 ML/MIN/1.73M2
EOSINOPHIL # BLD AUTO: 0.08 K/UL
EOSINOPHIL NFR BLD AUTO: 1.6 %
ESTIMATED AVERAGE GLUCOSE: 120 MG/DL
GLUCOSE SERPL-MCNC: 97 MG/DL
HBA1C MFR BLD HPLC: 5.8 %
HCT VFR BLD CALC: 47.8 %
HDLC SERPL-MCNC: 42 MG/DL
HGB BLD-MCNC: 15.9 G/DL
IMM GRANULOCYTES NFR BLD AUTO: 0.4 %
LDLC SERPL CALC-MCNC: 61 MG/DL
LDLC SERPL DIRECT ASSAY-MCNC: 59 MG/DL
LYMPHOCYTES # BLD AUTO: 1.07 K/UL
LYMPHOCYTES NFR BLD AUTO: 21.7 %
MAN DIFF?: NORMAL
MCHC RBC-ENTMCNC: 31.5 PG
MCHC RBC-ENTMCNC: 33.3 GM/DL
MCV RBC AUTO: 94.7 FL
MONOCYTES # BLD AUTO: 0.58 K/UL
MONOCYTES NFR BLD AUTO: 11.7 %
NEUTROPHILS # BLD AUTO: 3.15 K/UL
NEUTROPHILS NFR BLD AUTO: 63.8 %
NONHDLC SERPL-MCNC: 82 MG/DL
PLATELET # BLD AUTO: 166 K/UL
POTASSIUM SERPL-SCNC: 4.9 MMOL/L
PROT SERPL-MCNC: 6.7 G/DL
RBC # BLD: 5.05 M/UL
RBC # FLD: 12.1 %
SODIUM SERPL-SCNC: 138 MMOL/L
TRIGL SERPL-MCNC: 117 MG/DL
WBC # FLD AUTO: 4.94 K/UL

## 2024-07-30 RX ORDER — ESCITALOPRAM OXALATE 5 MG/1
5 TABLET ORAL DAILY
Qty: 30 | Refills: 1 | Status: ACTIVE | COMMUNITY
Start: 2024-07-30 | End: 1900-01-01

## 2024-07-30 NOTE — ASSESSMENT
[FreeTextEntry1] : #HCM Routine blood work ordered.   Preventative Care- Colon Cancer Screen- 2019- Polyps. repeat in 5 years.   Immunizations- Covid, Influenza, Tdap, Pneumococcal, Shingles- UTD with vaccinations.   RTC in 4 weeks or early if needed.  will obtain BMP, perform EKG for QT interval. Patient to provide baseline EKG result from his cardiologist.

## 2024-07-30 NOTE — PHYSICAL EXAM
[Normal Sclera/Conjunctiva] : normal sclera/conjunctiva [PERRL] : pupils equal round and reactive to light [EOMI] : extraocular movements intact [No Lymphadenopathy] : no lymphadenopathy [Supple] : supple [No Respiratory Distress] : no respiratory distress  [No Accessory Muscle Use] : no accessory muscle use [Clear to Auscultation] : lungs were clear to auscultation bilaterally [Normal Rate] : normal rate  [Regular Rhythm] : with a regular rhythm [Normal S1, S2] : normal S1 and S2 [Pedal Pulses Present] : the pedal pulses are present [No Edema] : there was no peripheral edema [Soft] : abdomen soft [Non Tender] : non-tender [Non-distended] : non-distended [Normal Bowel Sounds] : normal bowel sounds [Normal Supraclavicular Nodes] : no supraclavicular lymphadenopathy [Normal Posterior Cervical Nodes] : no posterior cervical lymphadenopathy [Normal Anterior Cervical Nodes] : no anterior cervical lymphadenopathy [No CVA Tenderness] : no CVA  tenderness [No Spinal Tenderness] : no spinal tenderness [Normal] : normal gait, coordination grossly intact, no focal deficits and deep tendon reflexes were 2+ and symmetric [Normal Affect] : the affect was normal [Alert and Oriented x3] : oriented to person, place, and time [Normal Mood] : the mood was normal [Normal Insight/Judgement] : insight and judgment were intact [de-identified] : thickened toe nails bilaterally

## 2024-07-30 NOTE — REVIEW OF SYSTEMS
[Insomnia] : insomnia [Depression] : depression [Negative] : Heme/Lymph [Suicidal] : not suicidal [Anxiety] : no anxiety

## 2024-07-30 NOTE — PHYSICAL EXAM
[Normal Sclera/Conjunctiva] : normal sclera/conjunctiva [PERRL] : pupils equal round and reactive to light [EOMI] : extraocular movements intact [No Lymphadenopathy] : no lymphadenopathy [Supple] : supple [No Respiratory Distress] : no respiratory distress  [No Accessory Muscle Use] : no accessory muscle use [Clear to Auscultation] : lungs were clear to auscultation bilaterally [Normal Rate] : normal rate  [Regular Rhythm] : with a regular rhythm [Normal S1, S2] : normal S1 and S2 [Pedal Pulses Present] : the pedal pulses are present [No Edema] : there was no peripheral edema [Soft] : abdomen soft [Non Tender] : non-tender [Non-distended] : non-distended [Normal Bowel Sounds] : normal bowel sounds [Normal Supraclavicular Nodes] : no supraclavicular lymphadenopathy [Normal Posterior Cervical Nodes] : no posterior cervical lymphadenopathy [Normal Anterior Cervical Nodes] : no anterior cervical lymphadenopathy [No CVA Tenderness] : no CVA  tenderness [No Spinal Tenderness] : no spinal tenderness [Normal] : normal gait, coordination grossly intact, no focal deficits and deep tendon reflexes were 2+ and symmetric [Normal Affect] : the affect was normal [Alert and Oriented x3] : oriented to person, place, and time [Normal Mood] : the mood was normal [Normal Insight/Judgement] : insight and judgment were intact [de-identified] : thickened toe nails bilaterally

## 2024-07-30 NOTE — HISTORY OF PRESENT ILLNESS
[FreeTextEntry1] : Establish care. [de-identified] : The patient is an 80 y/o M with PMHx of HTN, HLD, Afib, CAD (s/p stents), prediabetes, anxiety/depression, Fatty liver, glaucoma, BPH, BCC (s/p Mohs sx), and lung nodules, who presents today to establish care.   # CAD/HTN/Afib- followed by cardiology at [facility]. Endorsed medication compliance w/ Eliquis and Metoprolol. Walks 45 minutes 3x/ week. He denies chest pain, palpitations, SOB, dizziness, and denies any syncopal events.   # Anxiety/depression/insomnia- His son is alcoholic. Patient lives by himself. He has friends but not good support system.  Using Lorazepam as needed. PHQ-9 positive. Declined CBT. Would like to start antidepressants. He denies SI/HI     #History of smoking- Patient is a former smoker- h/o 30 yr 1ppd, quit > 20 yrs   CT lung screening 09/2019- Few less than 0.5 cm solid nodules are noted in the right middle lobe.  CT lung screening 1/202-: (c/w 9/19) Near complete resolution of the opacities/consolidations in the right upper lobe and the right middle lobe when compared to previous exam. Stable few small nodules in the right middle lobe when compared to previous exam. Lungs RADS 2: benign, rec: annual screening. Patient declined repeat CT in 2021. Today he reports that he would like to get repeat CT chest. orders placed.     Preventative Care- Colon Cancer Screen- 2019- Polyps. repeat in 5 years.   Immunizations- Covid, Influenza, Tdap, Pneumococcal, Shingles- UTD with vaccinations.

## 2024-07-30 NOTE — HISTORY OF PRESENT ILLNESS
[FreeTextEntry1] : Establish care. [de-identified] : The patient is an 78 y/o M with PMHx of HTN, HLD, Afib, CAD (s/p stents), prediabetes, anxiety/depression, Fatty liver, glaucoma, BPH, BCC (s/p Mohs sx), and lung nodules, who presents today to establish care.   # CAD/HTN/Afib- followed by cardiology at [facility]. Endorsed medication compliance w/ Eliquis and Metoprolol. Walks 45 minutes 3x/ week. He denies chest pain, palpitations, SOB, dizziness, and denies any syncopal events.   # Anxiety/depression/insomnia- His son is alcoholic. Patient lives by himself. He has friends but not good support system.  Using Lorazepam as needed. PHQ-9 positive. Declined CBT. Would like to start antidepressants. He denies SI/HI     #History of smoking- Patient is a former smoker- h/o 30 yr 1ppd, quit > 20 yrs   CT lung screening 09/2019- Few less than 0.5 cm solid nodules are noted in the right middle lobe.  CT lung screening 1/202-: (c/w 9/19) Near complete resolution of the opacities/consolidations in the right upper lobe and the right middle lobe when compared to previous exam. Stable few small nodules in the right middle lobe when compared to previous exam. Lungs RADS 2: benign, rec: annual screening. Patient declined repeat CT in 2021. Today he reports that he would like to get repeat CT chest. orders placed.     Preventative Care- Colon Cancer Screen- 2019- Polyps. repeat in 5 years.   Immunizations- Covid, Influenza, Tdap, Pneumococcal, Shingles- UTD with vaccinations.

## 2024-07-30 NOTE — HEALTH RISK ASSESSMENT
[Good] : ~his/her~  mood as  good [Yes] : Yes [2 - 4 times a month (2 pts)] : 2-4 times a month (2 points) [1 or 2 (0 pts)] : 1 or 2 (0 points) [Never (0 pts)] : Never (0 points) [No] : In the past 12 months have you used drugs other than those required for medical reasons? No [No falls in past year] : Patient reported no falls in the past year [2] : 2) Feeling down, depressed, or hopeless for more than half of the days (2) [1/2 of Days or More (2)] : 2.) Feeling down, depressed or hopeless? Half the days or more [Several Days (1)] : 8.) Moving or speaking so slowly that other people could have noticed, or the opposite, moving or speaking faster than usual? Several days [Not at All (0)] : 9.) Thoughts that you would be off dead or of hurting yourself in some way? Not at all [Somewhat Difficult] : How difficult have these problems made it for you to do your work, take care of things at home, or get along with people? Somewhat difficult [Alone] : lives alone [Retired] : retired [] :  [Fully functional (bathing, dressing, toileting, transferring, walking, feeding)] : Fully functional (bathing, dressing, toileting, transferring, walking, feeding) [Fully functional (using the telephone, shopping, preparing meals, housekeeping, doing laundry, using] : Fully functional and needs no help or supervision to perform IADLs (using the telephone, shopping, preparing meals, housekeeping, doing laundry, using transportation, managing medications and managing finances) [Former] : Former [Audit-CScore] : 2 [[id number]] : 4 [[id number]] : 10 [Reports changes in dental health] : Reports no changes in dental health [ColonoscopyDate] : 2019 [ColonoscopyComments] : polyps. repeat in 5 years.  [de-identified] : wear hearing aids [de-identified] : quit >20 yrs ago

## 2024-07-30 NOTE — HEALTH RISK ASSESSMENT
[Good] : ~his/her~  mood as  good [Yes] : Yes [2 - 4 times a month (2 pts)] : 2-4 times a month (2 points) [1 or 2 (0 pts)] : 1 or 2 (0 points) [Never (0 pts)] : Never (0 points) [No] : In the past 12 months have you used drugs other than those required for medical reasons? No [No falls in past year] : Patient reported no falls in the past year [2] : 2) Feeling down, depressed, or hopeless for more than half of the days (2) [1/2 of Days or More (2)] : 2.) Feeling down, depressed or hopeless? Half the days or more [Several Days (1)] : 8.) Moving or speaking so slowly that other people could have noticed, or the opposite, moving or speaking faster than usual? Several days [Not at All (0)] : 9.) Thoughts that you would be off dead or of hurting yourself in some way? Not at all [Somewhat Difficult] : How difficult have these problems made it for you to do your work, take care of things at home, or get along with people? Somewhat difficult [Alone] : lives alone [Retired] : retired [] :  [Fully functional (bathing, dressing, toileting, transferring, walking, feeding)] : Fully functional (bathing, dressing, toileting, transferring, walking, feeding) [Fully functional (using the telephone, shopping, preparing meals, housekeeping, doing laundry, using] : Fully functional and needs no help or supervision to perform IADLs (using the telephone, shopping, preparing meals, housekeeping, doing laundry, using transportation, managing medications and managing finances) [Former] : Former [Audit-CScore] : 2 [[id number]] : 4 [[id number]] : 10 [Reports changes in dental health] : Reports no changes in dental health [ColonoscopyDate] : 2019 [ColonoscopyComments] : polyps. repeat in 5 years.  [de-identified] : wear hearing aids [de-identified] : quit >20 yrs ago

## 2024-07-31 DIAGNOSIS — Z00.00 ENCOUNTER FOR GENERAL ADULT MEDICAL EXAMINATION WITHOUT ABNORMAL FINDINGS: ICD-10-CM

## 2024-07-31 DIAGNOSIS — R31.0 GROSS HEMATURIA: ICD-10-CM

## 2024-07-31 DIAGNOSIS — I10 ESSENTIAL (PRIMARY) HYPERTENSION: ICD-10-CM

## 2024-07-31 DIAGNOSIS — N40.1 BENIGN PROSTATIC HYPERPLASIA WITH LOWER URINARY TRACT SYMPTOMS: ICD-10-CM

## 2024-07-31 DIAGNOSIS — M65.4 RADIAL STYLOID TENOSYNOVITIS [DE QUERVAIN]: ICD-10-CM

## 2024-07-31 DIAGNOSIS — N40.1 BENIGN PROSTATIC HYPERPLASIA WITH LOWER URINARY TRACT SYMPMS: ICD-10-CM

## 2024-07-31 DIAGNOSIS — M54.12 RADICULOPATHY, CERVICAL REGION: ICD-10-CM

## 2024-07-31 DIAGNOSIS — F41.9 ANXIETY DISORDER, UNSPECIFIED: ICD-10-CM

## 2024-07-31 DIAGNOSIS — I48.91 UNSPECIFIED ATRIAL FIBRILLATION: ICD-10-CM

## 2024-07-31 DIAGNOSIS — L60.2 ONYCHOGRYPHOSIS: ICD-10-CM

## 2024-07-31 DIAGNOSIS — I25.10 ATHEROSCLEROTIC HEART DISEASE OF NATIVE CORONARY ARTERY WITHOUT ANGINA PECTORIS: ICD-10-CM

## 2024-07-31 DIAGNOSIS — Z86.010 PERSONAL HISTORY OF COLONIC POLYPS: ICD-10-CM

## 2024-08-01 ENCOUNTER — APPOINTMENT (OUTPATIENT)
Dept: UROLOGY | Facility: CLINIC | Age: 79
End: 2024-08-01
Payer: MEDICARE

## 2024-08-01 VITALS — HEART RATE: 69 BPM | DIASTOLIC BLOOD PRESSURE: 70 MMHG | SYSTOLIC BLOOD PRESSURE: 115 MMHG

## 2024-08-01 LAB
ANION GAP SERPL CALC-SCNC: 10 MMOL/L
BUN SERPL-MCNC: 12 MG/DL
CALCIUM SERPL-MCNC: 9.2 MG/DL
CHLORIDE SERPL-SCNC: 105 MMOL/L
CO2 SERPL-SCNC: 26 MMOL/L
CREAT SERPL-MCNC: 0.95 MG/DL
EGFR: 81 ML/MIN/1.73M2
GLUCOSE SERPL-MCNC: 95 MG/DL
POTASSIUM SERPL-SCNC: 4.4 MMOL/L
PSA FREE FLD-MCNC: 29 %
PSA FREE SERPL-MCNC: 0.27 NG/ML
PSA SERPL-MCNC: 0.94 NG/ML
SODIUM SERPL-SCNC: 141 MMOL/L

## 2024-08-01 PROCEDURE — G2211 COMPLEX E/M VISIT ADD ON: CPT

## 2024-08-01 PROCEDURE — 99214 OFFICE O/P EST MOD 30 MIN: CPT

## 2024-08-01 NOTE — HISTORY OF PRESENT ILLNESS
[FreeTextEntry1] : Follow-up BPH.  Flomax Proscar. Follow-up urine frequency.  Trospium. Follow-up hematuria.  Cytology.  Follow-up with me.  Please refer to URO Consult Note.

## 2024-08-01 NOTE — LETTER BODY
[FreeTextEntry1] : Dr. Tenzin Wilkes  [facility] [address] [id number] [phone]  Dear Dr. Wilkes,  Reason for visit: BPH. Urinary frequency. Previous hematuria.    This is a 79-year-old gentleman with urinary frequency and chronic BPH. The patient had a previous negative hematuria evaluation in February 2024/ Patient returns today for followup. Patient continues to take Flomax, Proscar, and Trospium. Patient reports taking the medications regularly without any side effects or difficulties with the medication. Patient reports stable symptoms with medical therapy. Patient denies any urinary retention or hematuria or changes in health. Patient has no pain. The past medical history and family history and social history are unchanged. All other review of systems are negative. Patient denies any changes in medications. Medication list was reconciled.    On examination, the patient is a well appearing gentleman in no acute distress. He is alert and oriented follows commands. He has normal mood and affect. He is normocephalic. Oral no thrush. Neck is supple. Respirations are unlabored. His abdomen is soft and nontender. Liver is nonpalpable. Bladder is nonpalpable. No CVA tenderness. Neurologically he is grossly intact. No peripheral edema. Skin without gross abnormality. On rectal examination, there is normal sphincter tone. The prostate is clinically benign without focal induration or nodularity.    Assessment: BPH, symptoms improved with Flomax. urinary frequency, improved with trospium.    I counseled the patient. In terms of his BPH, the patient reported stable symptoms on medical therapy. I recommended that he continue to take Flomax and Proscar. In terms of urinary frequency, his symptoms are stable Trospium. I renewed his prescriptions today for Flomax, Proscar and Trospium today. I encouraged him to continue medication as directed. I recommended the patient repeat PSA and BMP to establish baseline. In terms of his previous hematuria, his previous hematuria evaluation was negative. I recommended patient obtain urinalysis and urine cytology to look for high grade urothelial carcinoma. Risks and alternatives were discussed. I answered the patient questions. The patient will follow-up as directed and will contact me with any questions or concerns. Thank you for the opportunity to participate in the care of this patient. I'll keep you updated on his progress.  Patient will continue longitudinal care for their complex and chronic condition.  Plan: Continue Flomax, Proscar, and Trospium. PSA. BMP. Urinalysis. Urine cytology. Follow up in 1 year.

## 2024-08-01 NOTE — ADDENDUM
[FreeTextEntry1] : Entered by Chuy Lott, acting as scribe for Dr. Andrzej Ortiz. The documentation recorded by the scribe accurately reflects the service I personally performed and the decisions made by me.

## 2024-08-03 LAB
APPEARANCE: ABNORMAL
BACTERIA: NEGATIVE /HPF
BILIRUBIN URINE: NEGATIVE
BLOOD URINE: ABNORMAL
CALCIUM OXALATE CRYSTALS: PRESENT
CAST: 0 /LPF
COLOR: YELLOW
EPITHELIAL CELLS: 0 /HPF
GLUCOSE QUALITATIVE U: NEGATIVE MG/DL
KETONES URINE: NEGATIVE MG/DL
LEUKOCYTE ESTERASE URINE: NEGATIVE
MICROSCOPIC-UA: NORMAL
MUCUS: PRESENT
NITRITE URINE: NEGATIVE
PH URINE: 6
PROTEIN URINE: NEGATIVE MG/DL
RED BLOOD CELLS URINE: 16 /HPF
REVIEW: NORMAL
SPECIFIC GRAVITY URINE: 1.02
URINE CYTOLOGY: NORMAL
UROBILINOGEN URINE: 1 MG/DL
WHITE BLOOD CELLS URINE: 0 /HPF

## 2024-08-19 ENCOUNTER — NON-APPOINTMENT (OUTPATIENT)
Age: 79
End: 2024-08-19

## 2024-09-05 ENCOUNTER — OFFICE (OUTPATIENT)
Dept: URBAN - METROPOLITAN AREA CLINIC 90 | Facility: CLINIC | Age: 79
Setting detail: OPHTHALMOLOGY
End: 2024-09-05
Payer: MEDICARE

## 2024-09-05 DIAGNOSIS — H43.813: ICD-10-CM

## 2024-09-05 DIAGNOSIS — H25.12: ICD-10-CM

## 2024-09-05 DIAGNOSIS — H40.031: ICD-10-CM

## 2024-09-05 DIAGNOSIS — H01.001: ICD-10-CM

## 2024-09-05 DIAGNOSIS — H18.413: ICD-10-CM

## 2024-09-05 DIAGNOSIS — H59.811: ICD-10-CM

## 2024-09-05 DIAGNOSIS — H04.123: ICD-10-CM

## 2024-09-05 DIAGNOSIS — H16.223: ICD-10-CM

## 2024-09-05 DIAGNOSIS — H01.004: ICD-10-CM

## 2024-09-05 DIAGNOSIS — H40.1111: ICD-10-CM

## 2024-09-05 PROCEDURE — 92133 CPTRZD OPH DX IMG PST SGM ON: CPT | Performed by: OPHTHALMOLOGY

## 2024-09-05 PROCEDURE — 92014 COMPRE OPH EXAM EST PT 1/>: CPT | Performed by: OPHTHALMOLOGY

## 2024-09-05 ASSESSMENT — CONFRONTATIONAL VISUAL FIELD TEST (CVF)
OS_FINDINGS: FULL
OD_FINDINGS: FULL

## 2024-09-05 ASSESSMENT — LID POSITION - LOWER LID LAG
OS_LOWER_LID_LAG: LLL 1+
OD_LOWER_LID_LAG: RLL 1+

## 2024-09-05 ASSESSMENT — LID EXAM ASSESSMENTS
OS_BLEPHARITIS: LUL T 1+
OD_BLEPHARITIS: RUL T 1+

## 2024-09-12 ENCOUNTER — APPOINTMENT (OUTPATIENT)
Dept: INTERNAL MEDICINE | Facility: CLINIC | Age: 79
End: 2024-09-12
Payer: MEDICARE

## 2024-09-12 ENCOUNTER — OUTPATIENT (OUTPATIENT)
Dept: OUTPATIENT SERVICES | Facility: HOSPITAL | Age: 79
LOS: 1 days | End: 2024-09-12

## 2024-09-12 VITALS
OXYGEN SATURATION: 99 % | WEIGHT: 154 LBS | HEART RATE: 62 BPM | BODY MASS INDEX: 24.75 KG/M2 | HEIGHT: 66 IN | SYSTOLIC BLOOD PRESSURE: 118 MMHG | DIASTOLIC BLOOD PRESSURE: 71 MMHG

## 2024-09-12 DIAGNOSIS — K46.9 UNSPECIFIED ABDOMINAL HERNIA WITHOUT OBSTRUCTION OR GANGRENE: Chronic | ICD-10-CM

## 2024-09-12 DIAGNOSIS — F41.9 ANXIETY DISORDER, UNSPECIFIED: ICD-10-CM

## 2024-09-12 DIAGNOSIS — N40.1 BENIGN PROSTATIC HYPERPLASIA WITH LOWER URINARY TRACT SYMPMS: ICD-10-CM

## 2024-09-12 DIAGNOSIS — I10 ESSENTIAL (PRIMARY) HYPERTENSION: ICD-10-CM

## 2024-09-12 DIAGNOSIS — F32.9 MAJOR DEPRESSIVE DISORDER, SINGLE EPISODE, UNSPECIFIED: ICD-10-CM

## 2024-09-12 DIAGNOSIS — Z23 ENCOUNTER FOR IMMUNIZATION: ICD-10-CM

## 2024-09-12 DIAGNOSIS — I25.10 ATHEROSCLEROTIC HEART DISEASE OF NATIVE CORONARY ARTERY W/OUT ANGINA PECTORIS: ICD-10-CM

## 2024-09-12 DIAGNOSIS — F32.A ANXIETY DISORDER, UNSPECIFIED: ICD-10-CM

## 2024-09-12 DIAGNOSIS — I48.91 UNSPECIFIED ATRIAL FIBRILLATION: ICD-10-CM

## 2024-09-12 PROCEDURE — 99214 OFFICE O/P EST MOD 30 MIN: CPT | Mod: 25

## 2024-09-12 NOTE — PHYSICAL EXAM
[Normal] : no acute distress, well nourished, well developed and well-appearing [No Respiratory Distress] : no respiratory distress  [No Accessory Muscle Use] : no accessory muscle use [Clear to Auscultation] : lungs were clear to auscultation bilaterally [Normal Rate] : normal rate  [Normal S1, S2] : normal S1 and S2 [Pedal Pulses Present] : the pedal pulses are present [No Edema] : there was no peripheral edema [Soft] : abdomen soft [Non Tender] : non-tender [Non-distended] : non-distended [Normal Affect] : the affect was normal [Alert and Oriented x3] : oriented to person, place, and time [Normal Mood] : the mood was normal [Normal Insight/Judgement] : insight and judgment were intact

## 2024-09-13 DIAGNOSIS — I48.91 UNSPECIFIED ATRIAL FIBRILLATION: ICD-10-CM

## 2024-09-13 DIAGNOSIS — N40.1 BENIGN PROSTATIC HYPERPLASIA WITH LOWER URINARY TRACT SYMPTOMS: ICD-10-CM

## 2024-09-13 DIAGNOSIS — I10 ESSENTIAL (PRIMARY) HYPERTENSION: ICD-10-CM

## 2024-09-13 DIAGNOSIS — Z23 ENCOUNTER FOR IMMUNIZATION: ICD-10-CM

## 2024-09-13 DIAGNOSIS — I25.10 ATHEROSCLEROTIC HEART DISEASE OF NATIVE CORONARY ARTERY WITHOUT ANGINA PECTORIS: ICD-10-CM

## 2024-09-13 DIAGNOSIS — F41.9 ANXIETY DISORDER, UNSPECIFIED: ICD-10-CM

## 2024-09-16 NOTE — ASSESSMENT
[FreeTextEntry1] : The plan of care was discussed with the patient in full detail. He verbalized understanding and in agreement with the plan of care.  RTC in 3 months or early if needed.

## 2024-09-16 NOTE — HISTORY OF PRESENT ILLNESS
[FreeTextEntry1] : Follow up visit for chronic medical conditions.  [de-identified] : The patient is an 80 y/o M with PMHx of HTN, HLD, Afib, CAD (s/p stents), prediabetes, anxiety/depression, Fatty liver, glaucoma, BPH, BCC (s/p Mohs sx), and lung nodules, who presents today for a follow up visit for chronic medical conditions.   # CAD/HTN/Afib- followed by cardiology at [facility]. Endorsed medication compliance w/ Eliquis and Metoprolol. Walks 45 minutes 3x/ week. He denies chest pain, palpitations, SOB, dizziness, and denies any syncopal events.  # Anxiety/depression/insomnia- Last visit patient's PHQ-9 was positive. He endorsed depression. He reported that he lives by himself his son is alcoholic and patient feels bad that he is unable to help his son. He has friends but not good support system. He wanted to initiate antidepressants.  Patient was started on Lexapro, but he said his body didn't take it well. He was feeling jittery and very nervous after taking the pills. he did not like it and stopped taking it after 3-4 days. In the past he was started on Sertraline and had similar adverse effects with the medication. Patient reports he has good support from his friends and few family members, and he is learning to deal with depression. He denies SI/HI. declining CBT.

## 2024-09-16 NOTE — HISTORY OF PRESENT ILLNESS
[FreeTextEntry1] : Follow up visit for chronic medical conditions.  [de-identified] : The patient is an 78 y/o M with PMHx of HTN, HLD, Afib, CAD (s/p stents), prediabetes, anxiety/depression, Fatty liver, glaucoma, BPH, BCC (s/p Mohs sx), and lung nodules, who presents today for a follow up visit for chronic medical conditions.   # CAD/HTN/Afib- followed by cardiology at [facility]. Endorsed medication compliance w/ Eliquis and Metoprolol. Walks 45 minutes 3x/ week. He denies chest pain, palpitations, SOB, dizziness, and denies any syncopal events.  # Anxiety/depression/insomnia- Last visit patient's PHQ-9 was positive. He endorsed depression. He reported that he lives by himself his son is alcoholic and patient feels bad that he is unable to help his son. He has friends but not good support system. He wanted to initiate antidepressants.  Patient was started on Lexapro, but he said his body didn't take it well. He was feeling jittery and very nervous after taking the pills. he did not like it and stopped taking it after 3-4 days. In the past he was started on Sertraline and had similar adverse effects with the medication. Patient reports he has good support from his friends and few family members, and he is learning to deal with depression. He denies SI/HI. declining CBT.

## 2024-10-02 ASSESSMENT — REFRACTION_MANIFEST
OS_ADD: +3.50
OS_SPHERE: PLANO
OD_AXIS: 120
OD_CYLINDER: +2.75
OS_CYLINDER: +0.50
OS_AXIS: 140
OD_VA1: 20/30
OD_SPHERE: -5.25
OD_ADD: +3.50
OS_CYLINDER: -0.50
OD_VA1: 20/25-
OS_VA1: 20/30
OD_SPHERE: -3.25
OS_VA1: 20/30
OS_SPHERE: -0.50
OD_CYLINDER: -2.75
OS_AXIS: 045
OD_AXIS: 30

## 2024-10-02 ASSESSMENT — REFRACTION_CURRENTRX
OS_SPHERE: -0.50
OD_SPHERE: -3.25
OS_VPRISM_DIRECTION: PROGS
OD_AXIS: 120
OD_AXIS: 126
OD_OVR_VA: 20/
OD_VPRISM_DIRECTION: PROGS
OS_ADD: +3.50
OD_CYLINDER: -2.75
OS_SPHERE: +0.50
OD_VPRISM_DIRECTION: PROGS
OD_AXIS: 128
OS_ADD: +2.50
OD_CYLINDER: -1.75
OS_AXIS: 045
OS_OVR_VA: 20/
OD_VPRISM_DIRECTION: PROGS
OS_SPHERE: -0.50
OD_ADD: +3.50
OS_CYLINDER: -0.50
OS_AXIS: 045
OS_CYLINDER: -0.50
OD_ADD: +3.25
OS_VPRISM_DIRECTION: PROGS
OS_OVR_VA: 20/
OS_SPHERE: -0.50
OS_ADD: +3.25
OD_ADD: +2.50
OD_OVR_VA: 20/
OS_CYLINDER: -0.50
OD_SPHERE: -3.50
OS_VPRISM_DIRECTION: PROGS
OD_OVR_VA: 20/
OD_ADD: +2.50
OD_SPHERE: -2.75
OD_AXIS: 127
OS_AXIS: 036
OS_OVR_VA: 20/
OD_SPHERE: -3.25
OD_CYLINDER: -2.75
OS_ADD: +2.50
OD_CYLINDER: -2.50
OS_AXIS: 040
OS_CYLINDER: -0.50

## 2024-10-02 ASSESSMENT — KERATOMETRY
OD_AXISANGLE_DEGREES: 040
OS_K1POWER_DIOPTERS: 41.50
OD_K1POWER_DIOPTERS: 40.50
OS_K2POWER_DIOPTERS: 41.75
OD_K2POWER_DIOPTERS: 42.50
OS_AXISANGLE_DEGREES: 140
METHOD_AUTO_MANUAL: AUTO

## 2024-10-23 ENCOUNTER — APPOINTMENT (OUTPATIENT)
Dept: PHYSICAL MEDICINE AND REHAB | Facility: CLINIC | Age: 79
End: 2024-10-23
Payer: MEDICARE

## 2024-10-23 DIAGNOSIS — M47.812 SPONDYLOSIS W/OUT MYELOPATHY OR RADICULOPATHY, CERVICAL REGION: ICD-10-CM

## 2024-10-23 DIAGNOSIS — M79.18 MYALGIA, OTHER SITE: ICD-10-CM

## 2024-10-23 DIAGNOSIS — G89.29 MYALGIA, OTHER SITE: ICD-10-CM

## 2024-10-23 PROCEDURE — 99213 OFFICE O/P EST LOW 20 MIN: CPT

## 2025-02-03 ENCOUNTER — APPOINTMENT (OUTPATIENT)
Dept: DERMATOLOGY | Facility: CLINIC | Age: 80
End: 2025-02-03
Payer: MEDICARE

## 2025-02-03 DIAGNOSIS — L53.9 ERYTHEMATOUS CONDITION, UNSPECIFIED: ICD-10-CM

## 2025-02-03 DIAGNOSIS — Z85.828 PERSONAL HISTORY OF OTHER MALIGNANT NEOPLASM OF SKIN: ICD-10-CM

## 2025-02-03 DIAGNOSIS — L57.0 ACTINIC KERATOSIS: ICD-10-CM

## 2025-02-03 DIAGNOSIS — L73.8 OTHER SPECIFIED FOLLICULAR DISORDERS: ICD-10-CM

## 2025-02-03 PROCEDURE — 99204 OFFICE O/P NEW MOD 45 MIN: CPT

## 2025-02-03 RX ORDER — KETOCONAZOLE 20 MG/G
2 CREAM TOPICAL TWICE DAILY
Qty: 1 | Refills: 3 | Status: ACTIVE | COMMUNITY
Start: 2025-02-03 | End: 1900-01-01

## 2025-03-13 ENCOUNTER — OFFICE (OUTPATIENT)
Facility: LOCATION | Age: 80
Setting detail: OPHTHALMOLOGY
End: 2025-03-13
Payer: MEDICARE

## 2025-03-13 DIAGNOSIS — H01.001: ICD-10-CM

## 2025-03-13 DIAGNOSIS — H40.031: ICD-10-CM

## 2025-03-13 DIAGNOSIS — H04.123: ICD-10-CM

## 2025-03-13 DIAGNOSIS — H40.1111: ICD-10-CM

## 2025-03-13 DIAGNOSIS — H18.413: ICD-10-CM

## 2025-03-13 DIAGNOSIS — H01.004: ICD-10-CM

## 2025-03-13 DIAGNOSIS — H16.223: ICD-10-CM

## 2025-03-13 DIAGNOSIS — H25.12: ICD-10-CM

## 2025-03-13 PROCEDURE — 92014 COMPRE OPH EXAM EST PT 1/>: CPT | Performed by: OPHTHALMOLOGY

## 2025-03-13 ASSESSMENT — REFRACTION_CURRENTRX
OD_OVR_VA: 20/
OS_ADD: +2.50
OD_SPHERE: -2.75
OD_ADD: +1.25
OS_CYLINDER: -0.50
OD_SPHERE: -3.25
OS_OVR_VA: 20/
OD_OVR_VA: 20/
OD_AXIS: 120
OD_AXIS: 111
OD_CYLINDER: -2.75
OD_SPHERE: -3.50
OD_ADD: +2.50
OS_AXIS: 045
OD_CYLINDER: -1.75
OS_ADD: +3.25
OD_VPRISM_DIRECTION: PROGS
OS_CYLINDER: -0.50
OS_SPHERE: -0.50
OS_CYLINDER: -0.50
OS_ADD: +2.50
OS_ADD: +2.25
OD_CYLINDER: -2.75
OD_ADD: +3.25
OS_OVR_VA: 20/
OS_AXIS: 072
OS_VPRISM_DIRECTION: PROGS
OD_VPRISM_DIRECTION: PROGS
OD_AXIS: 126
OD_SPHERE: -3.00
OS_CYLINDER: -0.50
OS_CYLINDER: -0.50
OS_AXIS: 040
OD_SPHERE: -3.25
OD_ADD: +2.50
OD_CYLINDER: -2.75
OS_SPHERE: -0.50
OS_VPRISM_DIRECTION: PROGS
OS_AXIS: 045
OD_CYLINDER: -2.50
OS_OVR_VA: 20/
OS_SPHERE: +0.50
OD_ADD: +3.50
OS_SPHERE: -1.00
OD_AXIS: 128
OS_AXIS: 036
OD_OVR_VA: 20/
OS_SPHERE: -0.50
OS_VPRISM_DIRECTION: PROGS
OD_VPRISM_DIRECTION: PROGS
OS_ADD: +3.50
OD_AXIS: 127

## 2025-03-13 ASSESSMENT — REFRACTION_MANIFEST
OD_SPHERE: -5.25
OS_CYLINDER: +0.50
OS_SPHERE: -0.50
OS_AXIS: 045
OD_CYLINDER: +2.75
OD_SPHERE: -3.25
OS_SPHERE: PLANO
OD_VA1: 20/25-
OD_CYLINDER: -2.75
OD_VA1: 20/30
OD_AXIS: 30
OD_ADD: +3.50
OS_CYLINDER: -0.50
OS_AXIS: 140
OD_AXIS: 120
OS_ADD: +3.50
OS_VA1: 20/30
OS_VA1: 20/30

## 2025-03-13 ASSESSMENT — REFRACTION_AUTOREFRACTION
OS_SPHERE: -1.25
OD_CYLINDER: -3.25
OD_AXIS: 116
OD_SPHERE: -2.75
OS_AXIS: 068
OS_CYLINDER: -0.25

## 2025-03-13 ASSESSMENT — LID POSITION - LOWER LID LAG
OD_LOWER_LID_LAG: RLL 1+
OS_LOWER_LID_LAG: LLL 1+

## 2025-03-13 ASSESSMENT — KERATOMETRY
METHOD_AUTO_MANUAL: AUTO
OD_K2POWER_DIOPTERS: 42.25
OS_K1POWER_DIOPTERS: 41.50
OS_K2POWER_DIOPTERS: 41.50
OD_AXISANGLE_DEGREES: 029
OS_AXISANGLE_DEGREES: 090
OD_K1POWER_DIOPTERS: 40.00

## 2025-03-13 ASSESSMENT — SUPERFICIAL PUNCTATE KERATITIS (SPK)
OD_SPK: 1+ 2+
OS_SPK: 1+ 2+

## 2025-03-13 ASSESSMENT — VISUAL ACUITY
OS_BCVA: 20/40
OD_BCVA: 20/40

## 2025-03-13 ASSESSMENT — LID EXAM ASSESSMENTS
OS_BLEPHARITIS: LUL T 1+
OD_BLEPHARITIS: RUL T 1+

## 2025-03-13 ASSESSMENT — TONOMETRY
OS_IOP_MMHG: 12
OS_IOP_MMHG: 13
OD_IOP_MMHG: 19
OD_IOP_MMHG: 19

## 2025-05-16 ENCOUNTER — RX RENEWAL (OUTPATIENT)
Age: 80
End: 2025-05-16

## 2025-07-16 ENCOUNTER — APPOINTMENT (OUTPATIENT)
Dept: PHYSICAL MEDICINE AND REHAB | Facility: CLINIC | Age: 80
End: 2025-07-16
Payer: MEDICARE

## 2025-07-16 PROCEDURE — 99213 OFFICE O/P EST LOW 20 MIN: CPT

## 2025-07-16 RX ORDER — DICLOFENAC SODIUM 10 MG/G
1 GEL TOPICAL 3 TIMES DAILY
Qty: 1 | Refills: 2 | Status: ACTIVE | COMMUNITY
Start: 2025-07-16 | End: 1900-01-01

## 2025-07-24 ENCOUNTER — NON-APPOINTMENT (OUTPATIENT)
Age: 80
End: 2025-07-24

## 2025-07-24 ENCOUNTER — APPOINTMENT (OUTPATIENT)
Dept: UROLOGY | Facility: CLINIC | Age: 80
End: 2025-07-24
Payer: MEDICARE

## 2025-07-24 DIAGNOSIS — M75.21 BICIPITAL TENDINITIS, RIGHT SHOULDER: ICD-10-CM

## 2025-07-24 DIAGNOSIS — R35.0 FREQUENCY OF MICTURITION: ICD-10-CM

## 2025-07-24 DIAGNOSIS — R31.0 GROSS HEMATURIA: ICD-10-CM

## 2025-07-24 PROCEDURE — 51798 US URINE CAPACITY MEASURE: CPT

## 2025-07-24 PROCEDURE — 99214 OFFICE O/P EST MOD 30 MIN: CPT | Mod: 25

## 2025-09-03 ENCOUNTER — NON-APPOINTMENT (OUTPATIENT)
Age: 80
End: 2025-09-03

## 2025-09-03 DIAGNOSIS — N40.1 BENIGN PROSTATIC HYPERPLASIA WITH LOWER URINARY TRACT SYMPMS: ICD-10-CM

## 2025-09-04 LAB
ANION GAP SERPL CALC-SCNC: 12 MMOL/L
BUN SERPL-MCNC: 14 MG/DL
CALCIUM SERPL-MCNC: 9.2 MG/DL
CHLORIDE SERPL-SCNC: 102 MMOL/L
CO2 SERPL-SCNC: 26 MMOL/L
CREAT SERPL-MCNC: 0.95 MG/DL
EGFRCR SERPLBLD CKD-EPI 2021: 80 ML/MIN/1.73M2
GLUCOSE SERPL-MCNC: 84 MG/DL
POTASSIUM SERPL-SCNC: 4.6 MMOL/L
PSA FREE FLD-MCNC: 34 %
PSA FREE SERPL-MCNC: 0.31 NG/ML
PSA SERPL-MCNC: 0.91 NG/ML
SODIUM SERPL-SCNC: 140 MMOL/L

## 2025-09-11 ENCOUNTER — APPOINTMENT (OUTPATIENT)
Dept: INTERNAL MEDICINE | Facility: CLINIC | Age: 80
End: 2025-09-11

## 2025-09-11 ENCOUNTER — LABORATORY RESULT (OUTPATIENT)
Age: 80
End: 2025-09-11

## 2025-09-11 VITALS
DIASTOLIC BLOOD PRESSURE: 77 MMHG | TEMPERATURE: 97.8 F | BODY MASS INDEX: 25.07 KG/M2 | WEIGHT: 156 LBS | HEART RATE: 72 BPM | HEIGHT: 66 IN | SYSTOLIC BLOOD PRESSURE: 128 MMHG | OXYGEN SATURATION: 100 %

## 2025-09-11 VITALS — DIASTOLIC BLOOD PRESSURE: 64 MMHG | SYSTOLIC BLOOD PRESSURE: 124 MMHG

## 2025-09-11 DIAGNOSIS — M25.511 PAIN IN RIGHT SHOULDER: ICD-10-CM

## 2025-09-11 DIAGNOSIS — H40.10X0 UNSPECIFIED OPEN-ANGLE GLAUCOMA, STAGE UNSPECIFIED: ICD-10-CM

## 2025-09-11 DIAGNOSIS — R31.0 GROSS HEMATURIA: ICD-10-CM

## 2025-09-11 DIAGNOSIS — I10 ESSENTIAL (PRIMARY) HYPERTENSION: ICD-10-CM

## 2025-09-11 DIAGNOSIS — J30.1 ALLERGIC RHINITIS DUE TO POLLEN: ICD-10-CM

## 2025-09-11 DIAGNOSIS — Z00.00 ENCOUNTER FOR GENERAL ADULT MEDICAL EXAMINATION W/OUT ABNORMAL FINDINGS: ICD-10-CM

## 2025-09-11 DIAGNOSIS — Z87.891 PERSONAL HISTORY OF NICOTINE DEPENDENCE: ICD-10-CM

## 2025-09-11 DIAGNOSIS — H40.1210: ICD-10-CM

## 2025-09-11 DIAGNOSIS — H91.90 UNSPECIFIED HEARING LOSS, UNSPECIFIED EAR: ICD-10-CM

## 2025-09-11 DIAGNOSIS — Z12.11 ENCOUNTER FOR SCREENING FOR MALIGNANT NEOPLASM OF COLON: ICD-10-CM

## 2025-09-11 DIAGNOSIS — M25.512 PAIN IN RIGHT SHOULDER: ICD-10-CM

## 2025-09-11 DIAGNOSIS — N28.9 DISORDER OF KIDNEY AND URETER, UNSPECIFIED: ICD-10-CM

## 2025-09-11 DIAGNOSIS — Z86.59 PERSONAL HISTORY OF OTHER MENTAL AND BEHAVIORAL DISORDERS: ICD-10-CM

## 2025-09-11 DIAGNOSIS — I48.91 UNSPECIFIED ATRIAL FIBRILLATION: ICD-10-CM

## 2025-09-11 DIAGNOSIS — I25.10 ATHEROSCLEROTIC HEART DISEASE OF NATIVE CORONARY ARTERY W/OUT ANGINA PECTORIS: ICD-10-CM

## 2025-09-11 DIAGNOSIS — R91.1 SOLITARY PULMONARY NODULE: ICD-10-CM

## 2025-09-11 DIAGNOSIS — E78.5 HYPERLIPIDEMIA, UNSPECIFIED: ICD-10-CM

## 2025-09-11 PROCEDURE — 36415 COLL VENOUS BLD VENIPUNCTURE: CPT

## 2025-09-11 PROCEDURE — 93000 ELECTROCARDIOGRAM COMPLETE: CPT | Mod: 59

## 2025-09-11 PROCEDURE — 99213 OFFICE O/P EST LOW 20 MIN: CPT | Mod: 25

## 2025-09-11 PROCEDURE — G0439: CPT

## 2025-09-11 PROCEDURE — G0136: CPT

## 2025-09-11 PROCEDURE — G0444 DEPRESSION SCREEN ANNUAL: CPT | Mod: 59

## 2025-09-11 PROCEDURE — 82270 OCCULT BLOOD FECES: CPT

## 2025-09-12 LAB
25(OH)D3 SERPL-MCNC: 33.5 NG/ML
ALBUMIN SERPL ELPH-MCNC: 4.1 G/DL
ALP BLD-CCNC: 66 U/L
ALT SERPL-CCNC: 19 U/L
ANION GAP SERPL CALC-SCNC: 14 MMOL/L
APPEARANCE: CLEAR
AST SERPL-CCNC: 24 U/L
BASOPHILS # BLD AUTO: 0.03 K/UL
BASOPHILS NFR BLD AUTO: 0.5 %
BILIRUB SERPL-MCNC: 0.6 MG/DL
BILIRUBIN URINE: NEGATIVE
BLOOD URINE: ABNORMAL
BUN SERPL-MCNC: 13 MG/DL
CALCIUM SERPL-MCNC: 9 MG/DL
CHLORIDE SERPL-SCNC: 102 MMOL/L
CHOLEST SERPL-MCNC: 127 MG/DL
CO2 SERPL-SCNC: 24 MMOL/L
COLOR: YELLOW
CREAT SERPL-MCNC: 0.91 MG/DL
CRP SERPL HS-MCNC: 0.34 MG/L
EGFRCR SERPLBLD CKD-EPI 2021: 85 ML/MIN/1.73M2
EOSINOPHIL # BLD AUTO: 0.1 K/UL
EOSINOPHIL NFR BLD AUTO: 1.5 %
ERYTHROCYTE [SEDIMENTATION RATE] IN BLOOD BY WESTERGREN METHOD: 19 MM/HR
GLUCOSE QUALITATIVE U: NEGATIVE MG/DL
GLUCOSE SERPL-MCNC: 93 MG/DL
HCT VFR BLD CALC: 45.4 %
HDLC SERPL-MCNC: 43 MG/DL
HGB BLD-MCNC: 15.1 G/DL
IMM GRANULOCYTES NFR BLD AUTO: 0.3 %
KETONES URINE: NEGATIVE MG/DL
LDH SERPL-CCNC: 196 U/L
LDLC SERPL-MCNC: 58 MG/DL
LEUKOCYTE ESTERASE URINE: NEGATIVE
LYMPHOCYTES # BLD AUTO: 1.26 K/UL
LYMPHOCYTES NFR BLD AUTO: 19.1 %
MAN DIFF?: NORMAL
MCHC RBC-ENTMCNC: 30.8 PG
MCHC RBC-ENTMCNC: 33.3 G/DL
MCV RBC AUTO: 92.7 FL
MONOCYTES # BLD AUTO: 0.68 K/UL
MONOCYTES NFR BLD AUTO: 10.3 %
NEUTROPHILS # BLD AUTO: 4.49 K/UL
NEUTROPHILS NFR BLD AUTO: 68.3 %
NITRITE URINE: NEGATIVE
NONHDLC SERPL-MCNC: 84 MG/DL
PH URINE: 7
PHOSPHATE SERPL-MCNC: 3.3 MG/DL
PLATELET # BLD AUTO: 167 K/UL
POTASSIUM SERPL-SCNC: 4.9 MMOL/L
PROT SERPL-MCNC: 7 G/DL
PROTEIN URINE: NEGATIVE MG/DL
PSA SERPL-MCNC: 0.84 NG/ML
RBC # BLD: 4.9 M/UL
RBC # FLD: 12.2 %
SODIUM SERPL-SCNC: 140 MMOL/L
SPECIFIC GRAVITY URINE: 1.01
TRIGL SERPL-MCNC: 155 MG/DL
TSH SERPL-ACNC: 2.3 UIU/ML
URATE SERPL-MCNC: 3.8 MG/DL
UROBILINOGEN URINE: 0.2 MG/DL
WBC # FLD AUTO: 6.58 K/UL

## 2025-09-16 DIAGNOSIS — R91.8 OTHER NONSPECIFIC ABNORMAL FINDING OF LUNG FIELD: ICD-10-CM
